# Patient Record
Sex: FEMALE | Race: WHITE | Employment: UNEMPLOYED | URBAN - METROPOLITAN AREA
[De-identification: names, ages, dates, MRNs, and addresses within clinical notes are randomized per-mention and may not be internally consistent; named-entity substitution may affect disease eponyms.]

---

## 2016-07-29 LAB
CHLAMYDIA, EXTERNAL: NEGATIVE
HBSAG, EXTERNAL: NORMAL
HIV, EXTERNAL: NEGATIVE
N. GONORRHEA, EXTERNAL: NEGATIVE
RUBELLA, EXTERNAL: NORMAL
TYPE, ABO & RH, EXTERNAL: NORMAL

## 2016-12-10 LAB
ANTIBODY SCREEN, EXTERNAL: NEGATIVE
T. PALLIDUM, EXTERNAL: NEGATIVE

## 2017-02-03 LAB — GRBS, EXTERNAL: POSITIVE

## 2017-03-01 ENCOUNTER — HOSPITAL ENCOUNTER (INPATIENT)
Age: 22
LOS: 3 days | Discharge: HOME | End: 2017-03-04
Attending: OBSTETRICS & GYNECOLOGY | Admitting: OBSTETRICS & GYNECOLOGY
Payer: COMMERCIAL

## 2017-03-01 PROBLEM — O42.90 PROM (PREMATURE RUPTURE OF MEMBRANES): Status: ACTIVE | Noted: 2017-03-01

## 2017-03-01 LAB
ALBUMIN SERPL BCP-MCNC: 2.9 G/DL (ref 3.5–5)
ALBUMIN/GLOB SERPL: 0.8 {RATIO} (ref 1.1–2.2)
ALP SERPL-CCNC: 152 U/L (ref 45–117)
ALT SERPL-CCNC: 23 U/L (ref 12–78)
ANION GAP BLD CALC-SCNC: 15 MMOL/L (ref 5–15)
AST SERPL W P-5'-P-CCNC: 23 U/L (ref 15–37)
BASOPHILS # BLD AUTO: 0 K/UL (ref 0–0.1)
BASOPHILS # BLD: 0 % (ref 0–1)
BILIRUB SERPL-MCNC: 0.3 MG/DL (ref 0.2–1)
BUN SERPL-MCNC: 10 MG/DL (ref 6–20)
BUN/CREAT SERPL: 16 (ref 12–20)
CALCIUM SERPL-MCNC: 8.8 MG/DL (ref 8.5–10.1)
CHLORIDE SERPL-SCNC: 108 MMOL/L (ref 97–108)
CO2 SERPL-SCNC: 19 MMOL/L (ref 21–32)
CREAT SERPL-MCNC: 0.62 MG/DL (ref 0.55–1.02)
EOSINOPHIL # BLD: 0.1 K/UL (ref 0–0.4)
EOSINOPHIL NFR BLD: 1 % (ref 0–7)
ERYTHROCYTE [DISTWIDTH] IN BLOOD BY AUTOMATED COUNT: 13.7 % (ref 11.5–14.5)
GLOBULIN SER CALC-MCNC: 3.7 G/DL (ref 2–4)
GLUCOSE SERPL-MCNC: 101 MG/DL (ref 65–100)
HCT VFR BLD AUTO: 39.4 % (ref 35–47)
HGB BLD-MCNC: 13.7 G/DL (ref 11.5–16)
LDH SERPL L TO P-CCNC: 234 U/L (ref 81–246)
LYMPHOCYTES # BLD AUTO: 14 % (ref 12–49)
LYMPHOCYTES # BLD: 1.7 K/UL (ref 0.8–3.5)
MCH RBC QN AUTO: 31.5 PG (ref 26–34)
MCHC RBC AUTO-ENTMCNC: 34.8 G/DL (ref 30–36.5)
MCV RBC AUTO: 90.6 FL (ref 80–99)
MONOCYTES # BLD: 0.6 K/UL (ref 0–1)
MONOCYTES NFR BLD AUTO: 5 % (ref 5–13)
NEUTS SEG # BLD: 9.5 K/UL (ref 1.8–8)
NEUTS SEG NFR BLD AUTO: 80 % (ref 32–75)
PLATELET # BLD AUTO: 171 K/UL (ref 150–400)
POTASSIUM SERPL-SCNC: 3.5 MMOL/L (ref 3.5–5.1)
PROT SERPL-MCNC: 6.6 G/DL (ref 6.4–8.2)
RBC # BLD AUTO: 4.35 M/UL (ref 3.8–5.2)
SODIUM SERPL-SCNC: 142 MMOL/L (ref 136–145)
URATE SERPL-MCNC: 4.1 MG/DL (ref 2.6–6)
WBC # BLD AUTO: 11.9 K/UL (ref 3.6–11)

## 2017-03-01 PROCEDURE — 85025 COMPLETE CBC W/AUTO DIFF WBC: CPT | Performed by: OBSTETRICS & GYNECOLOGY

## 2017-03-01 PROCEDURE — 65410000002 HC RM PRIVATE OB

## 2017-03-01 PROCEDURE — 84550 ASSAY OF BLOOD/URIC ACID: CPT | Performed by: OBSTETRICS & GYNECOLOGY

## 2017-03-01 PROCEDURE — 75410000002 HC LABOR FEE PER 1 HR

## 2017-03-01 PROCEDURE — 74011000258 HC RX REV CODE- 258: Performed by: OBSTETRICS & GYNECOLOGY

## 2017-03-01 PROCEDURE — 36415 COLL VENOUS BLD VENIPUNCTURE: CPT | Performed by: OBSTETRICS & GYNECOLOGY

## 2017-03-01 PROCEDURE — 74011250636 HC RX REV CODE- 250/636: Performed by: OBSTETRICS & GYNECOLOGY

## 2017-03-01 PROCEDURE — 80053 COMPREHEN METABOLIC PANEL: CPT | Performed by: OBSTETRICS & GYNECOLOGY

## 2017-03-01 PROCEDURE — 83615 LACTATE (LD) (LDH) ENZYME: CPT | Performed by: OBSTETRICS & GYNECOLOGY

## 2017-03-01 RX ORDER — SODIUM CHLORIDE, SODIUM LACTATE, POTASSIUM CHLORIDE, CALCIUM CHLORIDE 600; 310; 30; 20 MG/100ML; MG/100ML; MG/100ML; MG/100ML
125 INJECTION, SOLUTION INTRAVENOUS CONTINUOUS
Status: DISCONTINUED | OUTPATIENT
Start: 2017-03-01 | End: 2017-03-03 | Stop reason: ALTCHOICE

## 2017-03-01 RX ORDER — SODIUM CHLORIDE 0.9 % (FLUSH) 0.9 %
5-10 SYRINGE (ML) INJECTION EVERY 8 HOURS
Status: DISCONTINUED | OUTPATIENT
Start: 2017-03-01 | End: 2017-03-03 | Stop reason: ALTCHOICE

## 2017-03-01 RX ORDER — SODIUM CHLORIDE 0.9 % (FLUSH) 0.9 %
5-10 SYRINGE (ML) INJECTION AS NEEDED
Status: DISCONTINUED | OUTPATIENT
Start: 2017-03-01 | End: 2017-03-04 | Stop reason: HOSPADM

## 2017-03-01 RX ORDER — ONDANSETRON 2 MG/ML
4 INJECTION INTRAMUSCULAR; INTRAVENOUS
Status: DISCONTINUED | OUTPATIENT
Start: 2017-03-01 | End: 2017-03-02 | Stop reason: HOSPADM

## 2017-03-01 RX ORDER — SODIUM CHLORIDE 900 MG/100ML
INJECTION INTRAVENOUS
Status: DISPENSED
Start: 2017-03-01 | End: 2017-03-02

## 2017-03-01 RX ORDER — PENICILLIN G POTASSIUM 5000000 [IU]/1
INJECTION, POWDER, FOR SOLUTION INTRAMUSCULAR; INTRAVENOUS
Status: DISPENSED
Start: 2017-03-01 | End: 2017-03-02

## 2017-03-01 RX ORDER — ACETAMINOPHEN 325 MG/1
650 TABLET ORAL
Status: DISCONTINUED | OUTPATIENT
Start: 2017-03-01 | End: 2017-03-02 | Stop reason: HOSPADM

## 2017-03-01 RX ORDER — OXYTOCIN IN 5 % DEXTROSE 30/500 ML
2 PLASTIC BAG, INJECTION (ML) INTRAVENOUS
Status: DISCONTINUED | OUTPATIENT
Start: 2017-03-01 | End: 2017-03-03 | Stop reason: ALTCHOICE

## 2017-03-01 RX ORDER — NALBUPHINE HYDROCHLORIDE 10 MG/ML
10 INJECTION, SOLUTION INTRAMUSCULAR; INTRAVENOUS; SUBCUTANEOUS
Status: DISCONTINUED | OUTPATIENT
Start: 2017-03-01 | End: 2017-03-02 | Stop reason: HOSPADM

## 2017-03-01 RX ORDER — OXYTOCIN IN 5 % DEXTROSE 30/500 ML
PLASTIC BAG, INJECTION (ML) INTRAVENOUS
Status: DISPENSED
Start: 2017-03-01 | End: 2017-03-02

## 2017-03-01 RX ORDER — NALOXONE HYDROCHLORIDE 0.4 MG/ML
0.4 INJECTION, SOLUTION INTRAMUSCULAR; INTRAVENOUS; SUBCUTANEOUS AS NEEDED
Status: DISCONTINUED | OUTPATIENT
Start: 2017-03-01 | End: 2017-03-02 | Stop reason: HOSPADM

## 2017-03-01 RX ADMIN — PENICILLIN G POTASSIUM 2.5 MILLION UNITS: 20000000 POWDER, FOR SOLUTION INTRAVENOUS at 21:34

## 2017-03-01 RX ADMIN — Medication 10 ML: at 17:10

## 2017-03-01 RX ADMIN — Medication 8 MILLI-UNITS/MIN: at 21:34

## 2017-03-01 RX ADMIN — SODIUM CHLORIDE, SODIUM LACTATE, POTASSIUM CHLORIDE, AND CALCIUM CHLORIDE 125 ML/HR: 600; 310; 30; 20 INJECTION, SOLUTION INTRAVENOUS at 17:42

## 2017-03-01 RX ADMIN — Medication 2 MILLI-UNITS/MIN: at 20:00

## 2017-03-01 RX ADMIN — Medication 6 MILLI-UNITS/MIN: at 21:00

## 2017-03-01 RX ADMIN — SODIUM CHLORIDE 5 MILLION UNITS: 900 INJECTION, SOLUTION INTRAVENOUS at 17:42

## 2017-03-01 NOTE — PROGRESS NOTES
Problem: Vaginal Delivery: Day of Deliver-Laboring  Goal: Activity/Safety  Outcome: Progressing Towards Goal  Reviewed security interventions such as ID bands, staff badges

## 2017-03-01 NOTE — IP AVS SNAPSHOT
Current Discharge Medication List  
  
Take these medications as needed Dose & Instructions Dispensing Information Comments Morning Noon Evening Bedtime  
 ibuprofen 600 mg tablet Commonly known as:  MOTRIN Your next dose is: Today, Tomorrow Other:  ____________ Dose:  600 mg Take 1 Tab by mouth every eight (8) hours as needed for Pain. Quantity:  36 Tab Refills:  2 Take these medications as directed Dose & Instructions Dispensing Information Comments Morning Noon Evening Bedtime PRENATAL DHA+COMPLETE PRENATAL -300 mg-mcg-mg Cmpk Generic drug:  PNV no.24-iron-folic acid-dha Your next dose is: Today, Tomorrow Other:  ____________ Take  by mouth. Refills:  0 Where to Get Your Medications Information about where to get these medications is not yet available ! Ask your nurse or doctor about these medications  
  ibuprofen 600 mg tablet

## 2017-03-01 NOTE — IP AVS SNAPSHOT
[address] [facility] 
[phone] Patient: Rahat Agustin MRN: [id number] :1995 You are allergic to the following No active allergies Recent Documentation Height Weight Breastfeeding? BMI OB Status Smoking Status 1.549 m 86.2 kg Unknown 35.9 kg/m2 Recent pregnancy Never Smoker Unresulted Labs Order Current Status SAMPLE TO BLOOD BANK In process Emergency Contacts Name Discharge Info Relation Home Work Mobile Mady Mccann  Parent [1]   [phone] About your hospitalization You were admitted on:  2017 You last received care in the:  [facility] You were discharged on:  2017 Unit phone number:  [phone] Why you were hospitalized Your primary diagnosis was:  Not on File Your diagnoses also included:  Prom (Premature Rupture Of Membranes) Providers Seen During Your Hospitalizations Provider Role Specialty Primary office phone Yennifer Otero MD Attending Provider Obstetrics & Gynecology [phone] Your Primary Care Physician (PCP) Primary Care Physician Office Phone Office Fax Chugn Tyler [phone] Follow-up Information Follow up With Details Comments Contact Info Nata Pal MD   [address] [place] [address] [address] [phone] Current Discharge Medication List  
  
START taking these medications Dose & Instructions Dispensing Information Comments Morning Noon Evening Bedtime  
 ibuprofen 600 mg tablet Commonly known as:  MOTRIN Your next dose is: Today, Tomorrow Other:  _________ Dose:  600 mg Take 1 Tab by mouth every eight (8) hours as needed for Pain. Quantity:  36 Tab Refills:  2 CONTINUE these medications which have NOT CHANGED Dose & Instructions Dispensing Information Comments Morning Noon Evening Bedtime PRENATAL DHA+COMPLETE PRENATAL -300 mg-mcg-mg Cmpk Generic drug:  PNV no.24-iron-folic acid-dha Your next dose is: Today, Tomorrow Other:  _________ Take  by mouth. Refills:  0 Where to Get Your Medications Information on where to get these meds will be given to you by the nurse or doctor. ! Ask your nurse or doctor about these medications  
  ibuprofen 600 mg tablet Discharge Instructions POST DELIVERY DISCHARGE INSTRUCTIONS Name: Razia Junior YOB: 1995 Primary Diagnosis: Active Problems: PROM (premature rupture of membranes) (3/1/2017) General:  
 
Diet/Diet Restrictions: 
· Eight 8-ounce glasses of fluid daily (water, juices); avoid excessive caffeine intake. · Meals/snacks as desired which are high in fiber and carbohydrates and low in fat and cholesterol. Medications:  
{Medication reconciliation information is now added to the patient's AVS automatically when it is printed. There is no need to use this SmartLink in discharge instructions. Highlight this text and delete it to clear this message} Physical Activity / Restrictions / Safety: · Avoid heavy lifting, no more that 8 lbs. For 2-3 weeks;  
· Limit use of stairs to 2 times daily for the first week home. · No driving for one week. · Avoid intercourse 4-6 weeks, no douching or tampon use. · Check with obstetrician before starting or resuming an exercise program.   
 
Discharge Instructions/Special Treatment/Home Care Needs:  
 
· Continue prenatal vitamins. · Continue to use squirt bottle with warm water on your episiotomy after each bathroom use until bleeding stops. · If steri-strips applied to your incision, remove in 7-10 days. Call your doctor for the following: · Fever over 101 degrees by mouth. · Vaginal bleeding heavier than a normal menstrual period or clots larger than a golf ball. · Red streaks or increased swelling of legs, painful red streaks on your breast. 
· Painful urination, constipation and increased pain or swelling or discharge with your incision. · If you feel extremely anxious or overwhelmed. · If you have thoughts of harming yourself and/or your baby. Pain Management:  
 
· Take Acetaminophen (Tylenol) or Ibuprofen (Advil, Motrin), as directed for pain. · Use a warm Sitz bath 3 times daily to relieve episiotomy or hemorrhoidal discomfort. · For hemorrhoidal discomfort, use Tucks and Anusol cream as needed and directed. · Heating pad to  incision as needed. Follow-Up Care:  
 
Appointment with MD: No orders of the defined types were placed in this encounter. Telephone number: [phone] Signed By: Aurea Flores MD                                                                                                   Date: 3/4/2017 Time: 6:47 AM 
 
.We are excited to announce that we are making your provider's discharge notes available to you in [address] You will see these notes when they are completed and signed by the physician that discharged you from your recent hospital stay. If you have any questions or concerns about any information you see in [facility], please call the Health Information Department where you were seen or reach out to your Primary Care Provider for more information about your plan of care. Discharge Orders None [facility] Announcement We are excited to announce that we are making your provider's discharge notes available to you in [employer]hart. You will see these notes when they are completed and signed by the physician that discharged you from your recent hospital stay.   If you have any questions or concerns about any information you see in [facility], please call the [employer] Information Department where you were seen or reach out to your Primary Care Provider for more information about your plan of care. Introducing [facility]! [facility] introduces Tweek[employer]oo patient portal. Now you can access parts of your medical record, email your doctor's office, and request medication refills online. 1. In your internet browser, go to [email or web address] 2. Click on the First Time User? Click Here link in the Sign In box. You will see the New Member Sign Up page. 3. Enter your Tweekaboo Access Code exactly as it appears below. You will not need to use this code after youve completed the sign-up process. If you do not sign up before the expiration date, you must request a new code. · Tweekaboo Access Code: [email or web address] Expires: 5/29/2017  5:10 PM 
 
4. Enter the last four digits of your Social Security Number (xxxx) and Date of Birth (mm/dd/yyyy) as indicated and click Submit. You will be taken to the next sign-up page. 5. Create a Tweekaboo ID. This will be your Tweekaboo login ID and cannot be changed, so think of one that is secure and easy to remember. 6. Create a Tweekaboo password. You can change your password at any time. 7. Enter your Password Reset Question and Answer. This can be used at a later time if you forget your password. 8. Enter your e-mail address. You will receive e-mail notification when new information is available in [address] 9. Click Sign Up. You can now view and download portions of your medical record. 10. Click the Download Summary menu link to download a portable copy of your medical information. If you have questions, please visit the Frequently Asked Questions section of the Tweekaboo website. Remember, Tweekaboo is NOT to be used for urgent needs. For medical emergencies, dial 911. Now available from your iPhone and Android! General Information Please provide this summary of care documentation to your next provider. Patient Signature:  ____________________________________________________________ Date:  ____________________________________________________________  
  
Joelene Batman Provider Signature:  ____________________________________________________________ Date:  ____________________________________________________________

## 2017-03-01 NOTE — H&P
ADDENDUM:  Patient seen and examined  Continues to leak clear fluid  Feeling contractions but not overly painful  Visit Vitals    /85    Pulse 96    Temp 99.2 °F (37.3 °C)    Resp 18    Ht 5' 1\" (1.549 m)    Wt 86.2 kg (190 lb)    SpO2 99%    Breastfeeding No    BMI 35.9 kg/m2   , mod variability +accels  Cervical exam deferred (1/60%/-2 in office)  [place] q 3 min  vtx by ultrasound  A/P: 25 yo G1 @ 39w4d with SROM  - Given contraction pattern will observe for cervical change, if no change at next exam will start augmentation  - GBS pos: pcn ongoing  - ghtn bps normal on admit, nml cbc, cmp on admit  - fsr/gbs pos/efw 7.5#      EDC:2017  EGA: 39 weeks, 4 days      Vital Signs   24Years Old Female  Weight: 190.5 pounds  BP:       130/84    Pap/HPV/Gardasil History   History of abnormal pap: no  Gardasil Injection History: Complete    Patient's Prenatal Care with Doctor of Record Cecilia Parry MD Notable For -    Gestational hypertension  Headache pregnant  GBS positive RX in labor  UTI pregnant NING __neg 2016__   lab screening  Normal pregnancy primigravida  FIBROCYSTIC BREAST DISEASE              Past Pregnancy History      :  1     Term Births:  0     Premature Births: 0     Living Children: 0     Para:   0     Prev : 0     Aborta:  0     Elect. Ab:  0     Spont. Ab:  0     Ectopics:  0    Pregnancy # 1     Comments:  current        Allergies    This patient has no known allergies. Medications Removed from Medication List        [facility] [place] for Follow-up Visit     Estimated weeks of        gestation:  39 4/7     Weight:  190.5     Blood pressure: 130 / 84     Urine Protein:  N     Urine Glucose: N     Headache:  +     Nausea/vomiting: No     Edema:   TrLE     Vaginal bleeding: no     Vaginal discharge: d/c     Fetal activity:  yes     Fundal height:    38     FHR:   147     Labor symptoms: few ctx     Fetal position:  vertex     Cx Dilation:  1     Cx Effacement: 60%     Cx Station:  -2     Preceptor:  héctor     Comment:  Reports everytime gets up, feels gush of fluid   Started on the way here about 3p  Clear, some mucous and white 'flecks'   Was scheduled for IOL. BP good, Mild HA but intermittent   Active FM   No contractions          Impression & Recommendations:    Problem # 1:  ROM at term (XFV-624.57) (NVN36-W12.67)  presents to office with SROM at 3p today   clear fluid   Active fetal movement   reviewed consent for delivery   Will monitor contractions and assess for labor. If needed can augment   CBC, CMP, sample to blood bank         Problem # 2:  GBS positive RX in labor (WKP-763.39) (UJR54-Z82.82)  penicillin per protocol       Problem # 3:  Gestational hypertension (CYW-304.38) (DAO34-L83.3)  normotensive today   Monitor BP in labor   Prior labs negative for preE  No proteinuria         Medications (at conclusion of this visit)    07/29/2016 ONE-A-DAY WOMENS PRENATAL 1 28-0.8-235 MG ORAL CAPS (PRENAT-FE CARBONYL-FA-OMEGA 3)           Primary Provider:  Zonia Cruz MD    CC:  Leaking Fluid.     History of Present Illness:  seen in office iwth c/o SROM  on the way to appointment   Confirmed on exam   Grossly ruptured   active FM   GBS +         Past Medical History:     Reviewed history from 11/11/2011 and no changes required:        none    Past Surgical History:     Reviewed history from 11/11/2011 and no changes required:        Lumpectomy - Removal of Fibroadenoma right breast 06/22/11    Family History Summary:      Reviewed history Last on 09/29/2016 and no changes required:03/01/2017      General Comments - FH:  Family History Diabetes- MGM, m uncle and aunt  Family History Hypertension - MGM    No Family History Breast Cancer  No Family History Ovarian Cancer  No Family History Colon Cancer      Social History:     Reviewed history from 05/27/2015 and no changes required:         2014        Attends [employer] - Senior--graduated       Previous Tobacco Use: Signed On - 08/29/2016  Smoked Tobacco Use:  Never smoker  Passive smoke exposure:  no  Drug use:  no  HIV high-risk behavior:  no  Caffeine use:  0 drinks per day    Previous Alcohol Use: Signed On - 08/29/2016  Alcohol use:  no  Exercise:  no  Seatbelt use:  100 %  Sun Exposure:  occasionally    Family History Risk Factors:     Family History of MI in females < 72years old:  no     Family History of MI in males < 54years old:  no      Review of Systems        See HPI    Except as noted in the HPI, the review of systems is negative for General and .       Vital Signs    Blood Pressure: 130 / 84  Contractions:  no    Weight:  190.5 pounds      Physical Exam     General           General appearance:  no acute distress    Head           Inspection:   normal    Eyes           External:   EOM intact    ENT           Dental:   adequate dentition    Chest           Lungs:  clear to auscultation          Heart:  regular rate and rhythm    Extremeties           Extremeties:  0 edema    Neurological           Reflexes:  2+ and symmetric with no pathological reflexes    Psych           Orientation:  oriented to time, place, and person          Mood:  no appearance of anxiety, depression, or agitation    Lymph           Inguinal:  no inguinal adenopathy    Skin           Inspection:  no rashes, suspicious lesions, or ulcerations    Abdomen           Abdomen:  gravid          Fundal Height:  38    Pelvic Exam           EGBUS:  no lesions          Vagina:  Positive  pool,fern and  nitrizine          Uterus:  gravid          Cervix:  no lesions or discharge                  Dilation: : 1                  Effacement:  60%                  Station:  -2                  Presentation:  vertex                  Membranes:  ruptured, clear fluid                   Pelvis:  adequate                  Consistency:  soft                  Position: anterior    Allergies    This patient has no known allergies. Medications Removed from Medication List        Impression & Recommendations:    Problem # 1:  ROM at term (DMQ-708.22) (OIQ87-F02.02)  presents to office with SROM at 3p today   clear fluid   Active fetal movement   reviewed consent for delivery   Will monitor contractions and assess for labor. If needed can augment   CBC, CMP, sample to blood bank         Problem # 2:  GBS positive RX in labor (BZQ-394.96) (DOH24-S22.82)  penicillin per protocol       Problem # 3:  Gestational hypertension (SXI-053.99) (DPK36-T11.3)  normotensive today   Monitor BP in labor   Prior labs negative for preE  No proteinuria         Medications (at conclusion of this visit)    07/29/2016 ONE-A-DAY WOMENS PRENATAL 1 28-0.8-235 MG ORAL CAPS (PRENAT-FE CARBONYL-FA-OMEGA 3)           LABORATORY DATA   TEST DATE RESULT   Group B Strep culture 02/03/2017 Positive                                   (Group B Strep Culture Result Field)   Blood Type 07/29/2016 O                                             (Blood Type Result Field)   Rh 07/29/2016 Positive                                   (Rh Result Field)   Rhogam Inj Given     Tdap Vaccine Given 12/09/2016 Vacc.  [address]   Antibody Screen 12/09/2016 Negative   Rubella  [facility] Reference Ranges On or After 3/10/14                  <0.90              Non-immune      0.90 - 0.99     Equivocal      >0.99              Immune    [facility] Reference Ranges  Before 3/10/14           <5                 Non-immune             5 - 9               Equivocal            >9                 Immune  [facility] Reference Ranges       < Or = 0.90       Negative             0.91-1.09          Equivocal            > Or = 1.10       Positive   07/29/2016     1.48     TPA (T Pallidum Antibodies) 12/09/2016 Negative   Serology (RPR) 11/11/2011 Non Reactive   HBsAg 07/29/2016 Negative   HIV 07/29/2016 Non Reactive   Hemoglobin 02/22/2017 13.6   Hematocrit 02/22/2017 39.7   Platelets 63/14/6362 175 X10E3/UL TSH     Urine Culture 09/29/2016 Negative   GC DNA Probe 07/29/2016 Negative   Chlamydia DNA 07/29/2016 Negative   PAP     Flu Vaccine Given 09/23/2016 Vacc.  VWC   HGBA1C     HGB Electro     T4, Free     BG Fasting     GTT 1H 50G 12/09/2016 131   GTT 1H 100G     GTT 2H 100G     GTT 3H 100G     Glucose Plasma     CF Accept or Decline 07/29/2016 declined   CF Screen Result     Nuchal Trans 07/29/2016 declined   AFP Only     Tetra 09/23/2016 Declined   AFP Serum     CVS 07/29/2016 declined   AFP Amniotic     Amnio Karyo 07/29/2016 declined   FISH     GC Culture     Chlamydia Cult     Ureaplasma     Mycoplasma     WBC 02/22/2017 10.1 X10E3/UL   RBC 02/22/2017 4.44 X10E6/UL   MCV 02/22/2017 89   MCH 02/22/2017 30.6   MCHC RBC 02/22/2017 34.3     ULTRASOUND DATA   TEST DATE RESULT   Estimated Fetal Weight 10/21/2016 [phone]                                     Weight % 10/21/2016 62nd%%                                                DEMARCO                      BPP     Cervical Length (mm)       ]

## 2017-03-02 PROCEDURE — 75410000000 HC DELIVERY VAGINAL/SINGLE

## 2017-03-02 PROCEDURE — 74011250636 HC RX REV CODE- 250/636: Performed by: OBSTETRICS & GYNECOLOGY

## 2017-03-02 PROCEDURE — 74011250637 HC RX REV CODE- 250/637: Performed by: OBSTETRICS & GYNECOLOGY

## 2017-03-02 PROCEDURE — 75410000002 HC LABOR FEE PER 1 HR

## 2017-03-02 PROCEDURE — 77030021125

## 2017-03-02 PROCEDURE — 75410000003 HC RECOV DEL/VAG/CSECN EA 0.5 HR

## 2017-03-02 PROCEDURE — 65410000002 HC RM PRIVATE OB

## 2017-03-02 PROCEDURE — 0HQ9XZZ REPAIR PERINEUM SKIN, EXTERNAL APPROACH: ICD-10-PCS | Performed by: OBSTETRICS & GYNECOLOGY

## 2017-03-02 PROCEDURE — 77030002888 HC SUT CHRMC J&J -A

## 2017-03-02 PROCEDURE — 77030011943

## 2017-03-02 RX ORDER — OXYTOCIN/RINGER'S LACTATE 20/1000 ML
125-500 PLASTIC BAG, INJECTION (ML) INTRAVENOUS ONCE
Status: ACTIVE | OUTPATIENT
Start: 2017-03-02 | End: 2017-03-02

## 2017-03-02 RX ORDER — BUPIVACAINE HYDROCHLORIDE AND EPINEPHRINE 2.5; 5 MG/ML; UG/ML
INJECTION, SOLUTION EPIDURAL; INFILTRATION; INTRACAUDAL; PERINEURAL
Status: DISPENSED
Start: 2017-03-02 | End: 2017-03-02

## 2017-03-02 RX ORDER — FENTANYL/BUPIVACAINE/NS/PF 2-1250MCG
PREFILLED PUMP RESERVOIR EPIDURAL
Status: DISPENSED
Start: 2017-03-02 | End: 2017-03-02

## 2017-03-02 RX ORDER — OXYCODONE AND ACETAMINOPHEN 5; 325 MG/1; MG/1
1 TABLET ORAL
Status: DISCONTINUED | OUTPATIENT
Start: 2017-03-02 | End: 2017-03-04 | Stop reason: HOSPADM

## 2017-03-02 RX ORDER — HYDROCORTISONE ACETATE PRAMOXINE HCL 2.5; 1 G/100G; G/100G
CREAM TOPICAL AS NEEDED
Status: DISCONTINUED | OUTPATIENT
Start: 2017-03-02 | End: 2017-03-04 | Stop reason: HOSPADM

## 2017-03-02 RX ORDER — IBUPROFEN 400 MG/1
800 TABLET ORAL EVERY 8 HOURS
Status: DISCONTINUED | OUTPATIENT
Start: 2017-03-02 | End: 2017-03-04 | Stop reason: HOSPADM

## 2017-03-02 RX ORDER — ADHESIVE BANDAGE
30 BANDAGE TOPICAL DAILY PRN
Status: DISCONTINUED | OUTPATIENT
Start: 2017-03-02 | End: 2017-03-04 | Stop reason: HOSPADM

## 2017-03-02 RX ORDER — NALOXONE HYDROCHLORIDE 0.4 MG/ML
0.4 INJECTION, SOLUTION INTRAMUSCULAR; INTRAVENOUS; SUBCUTANEOUS AS NEEDED
Status: DISCONTINUED | OUTPATIENT
Start: 2017-03-02 | End: 2017-03-04 | Stop reason: HOSPADM

## 2017-03-02 RX ADMIN — PENICILLIN G POTASSIUM 2.5 MILLION UNITS: 20000000 POWDER, FOR SOLUTION INTRAVENOUS at 01:30

## 2017-03-02 RX ADMIN — PENICILLIN G POTASSIUM 2.5 MILLION UNITS: 20000000 POWDER, FOR SOLUTION INTRAVENOUS at 09:30

## 2017-03-02 RX ADMIN — IBUPROFEN 800 MG: 400 TABLET, FILM COATED ORAL at 21:00

## 2017-03-02 RX ADMIN — NALBUPHINE HYDROCHLORIDE 10 MG: 10 INJECTION, SOLUTION INTRAMUSCULAR; INTRAVENOUS; SUBCUTANEOUS at 08:35

## 2017-03-02 RX ADMIN — NALBUPHINE HYDROCHLORIDE 10 MG: 10 INJECTION, SOLUTION INTRAMUSCULAR; INTRAVENOUS; SUBCUTANEOUS at 05:08

## 2017-03-02 RX ADMIN — SODIUM CHLORIDE, SODIUM LACTATE, POTASSIUM CHLORIDE, AND CALCIUM CHLORIDE 125 ML/HR: 600; 310; 30; 20 INJECTION, SOLUTION INTRAVENOUS at 09:30

## 2017-03-02 RX ADMIN — SODIUM CHLORIDE, SODIUM LACTATE, POTASSIUM CHLORIDE, AND CALCIUM CHLORIDE 125 ML/HR: 600; 310; 30; 20 INJECTION, SOLUTION INTRAVENOUS at 04:12

## 2017-03-02 RX ADMIN — IBUPROFEN 800 MG: 400 TABLET, FILM COATED ORAL at 13:28

## 2017-03-02 NOTE — PROGRESS NOTES
Dr. Vonda Yung at bedside to assess patient and SVE done. Pt 1/60/-2. Order to start Pitocin. Pt is in agreement.

## 2017-03-02 NOTE — PROGRESS NOTES
Mild discomfort with contractions  , mod variability + accels  [place]oco q 5 min  SVE 1/60%/-2  - No change since office exam 4 hours ago, will start pitocin for augmentation  - gbs pos on pcn

## 2017-03-02 NOTE — PROGRESS NOTES
TRANSFER - IN REPORT:    Verbal report received from MONET Cole RN(name) on Jamey Light  being received from L&D(unit) for routine progression of care      Report consisted of patients Situation, Background, Assessment and   Recommendations(SBAR). Information from the following report(s) SBAR, Procedure Summary, Intake/Output, MAR and Recent Results was reviewed with the receiving nurse. Opportunity for questions and clarification was provided. Assessment completed upon patients arrival to unit and care assumed.

## 2017-03-02 NOTE — PROGRESS NOTES
Brett known to me from office and prenatal course. Presented to office yesterday with SROM approx 3p   No cervical change on exam 4 hours post rupture   Category 1 fetal tracign   Started on pitocin for augmentation  Now at 22 with ctx every 2-3 minutes   Feeling uncomfortable, considering epidural  Reviewed pain control options includign nubain, nitrous and epidural  Some relief with prior nubain. Cervical Exam  Dilation (cm): 5  Eff: 100 %  Membrane Status: SROM   Station: 0   Vertex well applied.      Clear fluid noted     Proceed with epidural per patient request   Nitrous or nubain in interval if desired   Anticipate vaginal delivery   GBS+ on penicillin, no evidence of infection

## 2017-03-02 NOTE — L&D DELIVERY NOTE
Delivery Summary  Patient: Jeff Garber             Circumcision:   desires  Additional Delivery Comments - SROM with augmentation secondary to ineffective contractions. Proceeded with uncomplicated labor   , lamaze delivery   Vigorous infant at birth   1st degree perineal laceration repaired. 10cc 1%lidocaine local anesthetic for repair. Hemostatic post repair         Information for the patient's :  Duc More [[id number]]       Labor Events:    Labor: No   Rupture Date:     Rupture Time:     Rupture Type     Amniotic Fluid Volume: Moderate    Amniotic Fluid Description:       Induction: None       Augmentation: Oxytocin   Labor Events:       Cervical Ripening:           Delivery Events:  Episiotomy: None   Laceration(s): First degree perineal     Repaired: Yes    Number of Repair Packets: 1   Suture Type and Size: Chromic 2-0     Estimated Blood Loss (ml): 150ml       Delivery Date: 3/2/2017    Delivery Time: 10:23 AM  Delivery Type: Vaginal, Spontaneous Delivery  Sex:  Male     Gestational Age: 38w11d   Delivery Clinician:  Nerissa Bernstein  Living Status: Yes   Delivery Location: L&D            APGARS  One minute Five minutes Ten minutes   Skin color: 0   1        Heart rate: 2   2        Grimace: 2   2        Muscle tone: 1   2        Breathin   2        Totals: 7   9            Presentation: Vertex    Position:   Occiput Anterior  Resuscitation Method:  Suctioning-bulb; Tactile Stimulation     Meconium Stained: None      Cord Vessels: 3 Vessels      Cord Events:    Cord Blood Sent?:  No    Blood Gases Sent?:  No    Placenta:  Date/Time:    Removal: Spontaneous      Appearance: Normal     [place] Measurements:  Birth Weight: 3.425 kg      Birth Length: 53.3 cm      Head Circumference: 30.5 cm      Chest Circumference: 12.5 cm     Abdominal Girth: 29.2 cm    Other Providers:   STEPHAN BERNSTEIN;LETI HASKINS;LACHELLE ZHANG;;;;;;FILIBERTO BOONE, Obstetrician;Primary Nurse;Primary  Nurse;Nicu Nurse;Neonatologist;Anesthesiologist;Crna;Nursery Nurse;Tech           Cord pH:  none    Episiotomy: None   Laceration(s): First degree perineal      Estimated Blood Loss (ml): 150    Labor Events  Method: None       Augmentation: Oxytocin   Cervical Ripening:                Operative Vaginal Delivery - none    Group B Strep:   Lab Results   Component Value Date/Time    GrBStrep, External Positive 2017     Information for the patient's :  Roxann Gold [[id number]]   No results found for: ABORH, PCTABR, PCTDIG, BILI, ABORHEXT, ABORH    No results found for: APH, APCO2, APO2, AHCO3, ABEC, ABDC, O2ST, EPHV, PCO2V, PO2V, HCO3V, EBEV, EBDV, SITE, RSCOM

## 2017-03-02 NOTE — PROGRESS NOTES
0825: Dr. Carolina Dey in room, SVE done.       03/02/17 0825   Cervical Exam   Dilation (cm) 5   Eff 100 %

## 2017-03-02 NOTE — LACTATION NOTE
This note was copied from a baby's chart. 2 hours of age  1923 [facility] services introduced and well received. Reviewed basics of breastfeeding during the hospital stay, provided handouts and instruction in self breast massage/expression. Mother has obtained her insurance provided breast pump for prn uses. May bring to room if needed for review/instruction. Infant is rooting and fussy/cradle hold ineffective to latch. Reviewed un tuck chin and nipple to palate guidance for deep and comfortable latch. Cross cradle effective. Latched and nursing with repeated suckle / pause cycles. Reviewed bedside I/0 to track, how to know baby is getting enough. Mother receptive to nursing through her being straight cathed/full bladder. Repositioned afterwards to football hold, easy re latch. Finger suckle calming/soothing instructed if fussy prior to latching. On going 30 minute feeding, baby led feedings encouraged from start to finish. 1923 [facility] # provided. Expect success.

## 2017-03-03 PROCEDURE — 65410000002 HC RM PRIVATE OB

## 2017-03-03 PROCEDURE — 74011250637 HC RX REV CODE- 250/637: Performed by: OBSTETRICS & GYNECOLOGY

## 2017-03-03 RX ORDER — LIDOCAINE HYDROCHLORIDE 10 MG/ML
INJECTION, SOLUTION EPIDURAL; INFILTRATION; INTRACAUDAL; PERINEURAL
Status: DISPENSED
Start: 2017-03-03 | End: 2017-03-03

## 2017-03-03 RX ADMIN — IBUPROFEN 800 MG: 400 TABLET, FILM COATED ORAL at 20:36

## 2017-03-03 RX ADMIN — IBUPROFEN 800 MG: 400 TABLET, FILM COATED ORAL at 12:57

## 2017-03-03 RX ADMIN — IBUPROFEN 800 MG: 400 TABLET, FILM COATED ORAL at 05:06

## 2017-03-03 NOTE — PROGRESS NOTES
Post-Partum Day Number 1 Progress Note    Shane Harris     Assessment: Doing well, post partum day 1    Plan:  1. Continue routine postpartum and perineal care as well as maternal education. 2. Will plan circ for tomorrow given poor feeding. Information for the patient's :  [facility] Sun [[id number]]   Vaginal, Spontaneous Delivery   Patient doing well without significant complaint. Voiding without difficulty, normal lochia. Vitals:  Visit Vitals    /82 (BP 1 Location: Right arm, BP Patient Position: At rest)    Pulse 84    Temp 97.8 °F (36.6 °C)    Resp 17    Ht 5' 1\" (1.549 m)    Wt 86.2 kg (190 lb)    SpO2 97%    Breastfeeding Unknown    BMI 35.9 kg/m2     Temp (24hrs), Av.3 °F (36.8 °C), Min:97.7 °F (36.5 °C), Max:99.5 °F (37.5 °C)        Exam:   Patient without distress. Abdomen soft, fundus firm, nontender                Perineum with normal lochia noted. Lower extremities are negative for swelling, cords or tenderness. Labs:     Lab Results   Component Value Date/Time    WBC 11.9 2017 05:13 PM    WBC 5.5 2014 09:52 AM    HGB 13.7 2017 05:13 PM    HGB 13.4 2014 09:52 AM    HCT 39.4 2017 05:13 PM    HCT 41.3 2014 09:52 AM    PLATELET 070  05:13 PM    PLATELET 210  09:52 AM       No results found for this or any previous visit (from the past 24 hour(s)).

## 2017-03-03 NOTE — PROGRESS NOTES
Bedside shift change report given to GOMEZ Ford (oncoming nurse) by Lauren Rodriguez (offgoing nurse). Report included the following information SBAR.

## 2017-03-03 NOTE — LACTATION NOTE
This note was copied from a baby's chart. Day 1 progress note  19 hours of life, 7 baby led breast feeeing sessions. Mother independent and well read. Bedside I/0 on tract with 4 wet and 4 stools. Parents enjoying baby. Feedings of short duration 5-10 minute sessions, baby is content. Am wt assessed at -2.4 %  Encouraged to continue with hand expression/massage during feeding, will continue to follow and support. Expect feedings to last longer as infant wakes more today. [address] [facility] # provided. Call prn.   1100 Assisted to wake infant, finger suckle assessment and stimulation of suckle reflex instructed. Support breast to guide nipple to palate contact. Infant is not eager at this time. Will benefit by using symphony breast pump today to ebm and stimulate supply. 1115 Pump set up and sized to 27 mm flanges. Infant skin to skin and stimulating to suckle. Recommend double pumping 10-15 minutes pc to begin adequate breast emptying to maximize milk supply. Mother agrees. 1400 5 minute feeding at 1345. Mother eating lunch now. Instructed to start double pumping every 2-3 hours x 20 minutes until baby waking and nursing vigorously. Staff nurse notified/aware of poor feeds. 1500 Mother now double pumping. Gtts colostrum noted in flange. Gtts to finger feed or nuk nipple instructed.

## 2017-03-03 NOTE — ROUTINE PROCESS
Bedside shift change report given to ROWENA Rodriguez RN (oncoming nurse) by ROSAMARIA Muhammad RN (offgoing nurse). Report included the following information SBAR, Procedure Summary, Intake/Output, MAR and Recent Results.

## 2017-03-04 VITALS
RESPIRATION RATE: 17 BRPM | DIASTOLIC BLOOD PRESSURE: 87 MMHG | OXYGEN SATURATION: 97 % | HEIGHT: 61 IN | WEIGHT: 190 LBS | SYSTOLIC BLOOD PRESSURE: 147 MMHG | BODY MASS INDEX: 35.87 KG/M2 | HEART RATE: 107 BPM | TEMPERATURE: 98.2 F

## 2017-03-04 PROCEDURE — 74011250637 HC RX REV CODE- 250/637: Performed by: OBSTETRICS & GYNECOLOGY

## 2017-03-04 RX ORDER — IBUPROFEN 600 MG/1
600 TABLET ORAL
Qty: 36 TAB | Refills: 2 | Status: ON HOLD | OUTPATIENT
Start: 2017-03-04 | End: 2019-10-31 | Stop reason: CLARIF

## 2017-03-04 RX ADMIN — IBUPROFEN 800 MG: 400 TABLET, FILM COATED ORAL at 04:28

## 2017-03-04 RX ADMIN — IBUPROFEN 800 MG: 400 TABLET, FILM COATED ORAL at 12:24

## 2017-03-04 NOTE — ROUTINE PROCESS
Bedside shift change report given to GOMEZ Ashley RN (oncoming nurse) by LAYA Frances RN (offgoing nurse). Report included the following information SBAR.

## 2017-03-04 NOTE — PROGRESS NOTES
Post-Partum Day Number 2 Progress Note    Elías Márquez     Assessment: Doing well, post partum day 2    Plan:   1. Discharge home today  2. Follow up in office in 6 weeks with Nishant Acuña MD  3. Post partum activity advised, diet as tolerated  4. Discharge Medications: ibuprofen, percocet and medications prior to admission    Information for the patient's :  Swathi Call [[id number]]   Vaginal, Spontaneous Delivery   Patient doing well without significant complaint. Voiding without difficulty, normal lochia. Vitals:  Visit Vitals    /87 (BP 1 Location: Left arm, BP Patient Position: At rest;Head of bed elevated (Comment degrees))    Pulse (!) 107    Temp 98 °F (36.7 °C)    Resp 16    Ht 5' 1\" (1.549 m)    Wt 86.2 kg (190 lb)    SpO2 97%    Breastfeeding Unknown    BMI 35.9 kg/m2     Temp (24hrs), Av.2 °F (36.8 °C), Min:97.8 °F (36.6 °C), Max:98.5 °F (36.9 °C)      Exam:         Patient without distress. Abdomen soft, fundus firm, nontender                 Lower extremities are negative for swelling, cords or tenderness. Labs:     Lab Results   Component Value Date/Time    WBC 11.9 2017 05:13 PM    WBC 5.5 2014 09:52 AM    HGB 13.7 2017 05:13 PM    HGB 13.4 2014 09:52 AM    HCT 39.4 2017 05:13 PM    HCT 41.3 2014 09:52 AM    PLATELET 169  05:13 PM    PLATELET 075  09:52 AM       No results found for this or any previous visit (from the past 24 hour(s)).

## 2017-03-04 NOTE — PROGRESS NOTES
Patient discharged out to car in wheelchair and SO by side. All belongings intact. Infant in carseat.

## 2017-03-04 NOTE — PROGRESS NOTES
Discharge instructions went over with patient. Patient verbalizes understanding and all questions answered. Prescriptions given to patient. No further questions.

## 2017-03-04 NOTE — LACTATION NOTE
This note was copied from a baby's chart. Breastfeeding after 4 ml's of EBM given at 1045. Weight loss is 7.8% with 2 voids and 3 stools in 24 hours. Mom has pumped 3 ml's, 3 ml's and 4 ml's. Mom pumped after circumcision to help wake up. Possible discharge today pending culture.

## 2017-03-04 NOTE — DISCHARGE INSTRUCTIONS
POST DELIVERY DISCHARGE INSTRUCTIONS    Name: Leonie Leblanc  YOB: 1995  Primary Diagnosis: Active Problems:    PROM (premature rupture of membranes) (3/1/2017)        General:     Diet/Diet Restrictions:  · Eight 8-ounce glasses of fluid daily (water, juices); avoid excessive caffeine intake. · Meals/snacks as desired which are high in fiber and carbohydrates and low in fat and cholesterol. Medications:   {Medication reconciliation information is now added to the patient's AVS automatically when it is printed. There is no need to use this SmartLink in discharge instructions. Highlight this text and delete it to clear this message}      Physical Activity / Restrictions / Safety:     · Avoid heavy lifting, no more that 8 lbs. For 2-3 weeks;   · Limit use of stairs to 2 times daily for the first week home. · No driving for one week. · Avoid intercourse 4-6 weeks, no douching or tampon use. · Check with obstetrician before starting or resuming an exercise program.      Discharge Instructions/Special Treatment/Home Care Needs:     · Continue prenatal vitamins. · Continue to use squirt bottle with warm water on your episiotomy after each bathroom use until bleeding stops. · If steri-strips applied to your incision, remove in 7-10 days. Call your doctor for the following:     · Fever over 101 degrees by mouth. · Vaginal bleeding heavier than a normal menstrual period or clots larger than a golf ball. · Red streaks or increased swelling of legs, painful red streaks on your breast.  · Painful urination, constipation and increased pain or swelling or discharge with your incision. · If you feel extremely anxious or overwhelmed. · If you have thoughts of harming yourself and/or your baby. Pain Management:     · Take Acetaminophen (Tylenol) or Ibuprofen (Advil, Motrin), as directed for pain. · Use a warm Sitz bath 3 times daily to relieve episiotomy or hemorrhoidal discomfort.    · For hemorrhoidal discomfort, use Tucks and Anusol cream as needed and directed. · Heating pad to  incision as needed. Follow-Up Care:     Appointment with MD: No orders of the defined types were placed in this encounter. Telephone number: [phone]    Signed By: Poornima Wing MD                                                                                                   Date: 3/4/2017 Time: 6:47 AM    .We are excited to announce that we are making your provider's discharge notes available to you in [address] You will see these notes when they are completed and signed by the physician that discharged you from your recent hospital stay. If you have any questions or concerns about any information you see in MyChart, please call the Health Information Department where you were seen or reach out to your Primary Care Provider for more information about your plan of care.

## 2017-03-04 NOTE — DISCHARGE SUMMARY
Obstetrical Discharge Summary     Name: Nena Kaur MRN: [id number]  SSN: [id number]    YOB: 1995  Age: 24 y.o. Sex: female      Admit Date: 3/1/2017    Discharge Date: 3/4/2017     Admitting Physician: Steve George MD     Attending Physician:  Annamarie Schwartz MD     Admission Diagnoses: indInduction  PROM (premature rupture of membranes)    Discharge Diagnoses:   Information for the patient's :  Roxann Gold [[id number]]   Delivery of a 3.425 kg male infant via Vaginal, Spontaneous Delivery on 3/2/2017 at 10:23 AM  by . Apgars were 7 and 9. Additional Diagnoses:   Hospital Problems  Never Reviewed          Codes Class Noted POA    PROM (premature rupture of membranes) ICD-10-CM: O42.90  ICD-9-CM: 658.10  3/1/2017 Unknown             Lab Results   Component Value Date/Time    Rubella, External Immune 2016    GrBStrep, External Positive 2017       Hospital Course: Normal hospital course following the delivery. Disposition at Discharge: Home or self care    Discharged Condition: Stable    Patient Instructions:   Current Discharge Medication List      CONTINUE these medications which have NOT CHANGED    Details   PNV no.24-iron-folic acid-dha (PRENATAL DHA+COMPLETE PRENATAL) -300 mg-mcg-mg cmpk Take  by mouth. Reference my discharge instructions. No orders of the defined types were placed in this encounter.        Signed By:  Fay Osborne MD     2017

## 2017-03-04 NOTE — ROUTINE PROCESS
Bedside and Verbal shift change report given to LAYA Loco (oncoming nurse) by Vicente Black. Jeanne Farr RN (offgoing nurse). Report included the following information SBAR.

## 2018-07-09 VITALS
HEIGHT: 62 IN | TEMPERATURE: 98 F | DIASTOLIC BLOOD PRESSURE: 81 MMHG | HEART RATE: 86 BPM | OXYGEN SATURATION: 100 % | BODY MASS INDEX: 24.18 KG/M2 | SYSTOLIC BLOOD PRESSURE: 139 MMHG | WEIGHT: 131.39 LBS | RESPIRATION RATE: 16 BRPM

## 2018-07-09 LAB
ALBUMIN SERPL-MCNC: 4.4 G/DL (ref 3.5–5)
ALBUMIN/GLOB SERPL: 1.4 {RATIO} (ref 1.1–2.2)
ALP SERPL-CCNC: 84 U/L (ref 45–117)
ALT SERPL-CCNC: 20 U/L (ref 12–78)
ANION GAP SERPL CALC-SCNC: 8 MMOL/L (ref 5–15)
AST SERPL-CCNC: 14 U/L (ref 15–37)
BASOPHILS # BLD: 0 K/UL (ref 0–0.1)
BASOPHILS NFR BLD: 1 % (ref 0–1)
BILIRUB SERPL-MCNC: 0.2 MG/DL (ref 0.2–1)
BUN SERPL-MCNC: 14 MG/DL (ref 6–20)
BUN/CREAT SERPL: 19 (ref 12–20)
CALCIUM SERPL-MCNC: 9.5 MG/DL (ref 8.5–10.1)
CHLORIDE SERPL-SCNC: 108 MMOL/L (ref 97–108)
CO2 SERPL-SCNC: 27 MMOL/L (ref 21–32)
CREAT SERPL-MCNC: 0.74 MG/DL (ref 0.55–1.02)
DIFFERENTIAL METHOD BLD: NORMAL
EOSINOPHIL # BLD: 0.2 K/UL (ref 0–0.4)
EOSINOPHIL NFR BLD: 2 % (ref 0–7)
ERYTHROCYTE [DISTWIDTH] IN BLOOD BY AUTOMATED COUNT: 12.3 % (ref 11.5–14.5)
GLOBULIN SER CALC-MCNC: 3.1 G/DL (ref 2–4)
GLUCOSE SERPL-MCNC: 109 MG/DL (ref 65–100)
HCT VFR BLD AUTO: 41.4 % (ref 35–47)
HGB BLD-MCNC: 13.8 G/DL (ref 11.5–16)
IMM GRANULOCYTES # BLD: 0 K/UL (ref 0–0.04)
IMM GRANULOCYTES NFR BLD AUTO: 0 % (ref 0–0.5)
LYMPHOCYTES # BLD: 2.3 K/UL (ref 0.8–3.5)
LYMPHOCYTES NFR BLD: 33 % (ref 12–49)
MCH RBC QN AUTO: 29.7 PG (ref 26–34)
MCHC RBC AUTO-ENTMCNC: 33.3 G/DL (ref 30–36.5)
MCV RBC AUTO: 89.2 FL (ref 80–99)
MONOCYTES # BLD: 0.4 K/UL (ref 0–1)
MONOCYTES NFR BLD: 5 % (ref 5–13)
NEUTS SEG # BLD: 4.2 K/UL (ref 1.8–8)
NEUTS SEG NFR BLD: 59 % (ref 32–75)
NRBC # BLD: 0 K/UL (ref 0–0.01)
NRBC BLD-RTO: 0 PER 100 WBC
PLATELET # BLD AUTO: 201 K/UL (ref 150–400)
PMV BLD AUTO: 11 FL (ref 8.9–12.9)
POTASSIUM SERPL-SCNC: 3.9 MMOL/L (ref 3.5–5.1)
PROT SERPL-MCNC: 7.5 G/DL (ref 6.4–8.2)
RBC # BLD AUTO: 4.64 M/UL (ref 3.8–5.2)
SODIUM SERPL-SCNC: 143 MMOL/L (ref 136–145)
WBC # BLD AUTO: 7 K/UL (ref 3.6–11)

## 2018-07-09 PROCEDURE — 85025 COMPLETE CBC W/AUTO DIFF WBC: CPT | Performed by: EMERGENCY MEDICINE

## 2018-07-09 PROCEDURE — 36415 COLL VENOUS BLD VENIPUNCTURE: CPT | Performed by: EMERGENCY MEDICINE

## 2018-07-09 PROCEDURE — 80053 COMPREHEN METABOLIC PANEL: CPT | Performed by: EMERGENCY MEDICINE

## 2018-07-09 PROCEDURE — 83690 ASSAY OF LIPASE: CPT | Performed by: EMERGENCY MEDICINE

## 2018-07-09 PROCEDURE — 99283 EMERGENCY DEPT VISIT LOW MDM: CPT

## 2018-07-10 ENCOUNTER — HOSPITAL ENCOUNTER (EMERGENCY)
Age: 23
Discharge: HOME | End: 2018-07-10
Attending: EMERGENCY MEDICINE
Payer: COMMERCIAL

## 2018-07-10 DIAGNOSIS — R11.0 NAUSEA WITHOUT VOMITING: ICD-10-CM

## 2018-07-10 DIAGNOSIS — R10.84 ABDOMINAL PAIN, GENERALIZED: Primary | ICD-10-CM

## 2018-07-10 DIAGNOSIS — K29.00 ACUTE GASTRITIS WITHOUT HEMORRHAGE, UNSPECIFIED GASTRITIS TYPE: ICD-10-CM

## 2018-07-10 LAB
APPEARANCE UR: CLEAR
BACTERIA URNS QL MICRO: ABNORMAL /HPF
BILIRUB UR QL: NEGATIVE
COLOR UR: ABNORMAL
EPITH CASTS URNS QL MICRO: ABNORMAL /LPF
GLUCOSE UR STRIP.AUTO-MCNC: NEGATIVE MG/DL
HCG UR QL: NEGATIVE
HGB UR QL STRIP: NEGATIVE
HYALINE CASTS URNS QL MICRO: ABNORMAL /LPF (ref 0–5)
KETONES UR QL STRIP.AUTO: NEGATIVE MG/DL
LEUKOCYTE ESTERASE UR QL STRIP.AUTO: NEGATIVE
LIPASE SERPL-CCNC: 157 U/L (ref 73–393)
NITRITE UR QL STRIP.AUTO: NEGATIVE
PH UR STRIP: 7 [PH] (ref 5–8)
PROT UR STRIP-MCNC: NEGATIVE MG/DL
RBC #/AREA URNS HPF: ABNORMAL /HPF (ref 0–5)
SP GR UR REFRACTOMETRY: 1.02 (ref 1–1.03)
UA: UC IF INDICATED,UAUC: ABNORMAL
UROBILINOGEN UR QL STRIP.AUTO: 0.2 EU/DL (ref 0.2–1)
WBC URNS QL MICRO: ABNORMAL /HPF (ref 0–4)

## 2018-07-10 PROCEDURE — 87086 URINE CULTURE/COLONY COUNT: CPT | Performed by: EMERGENCY MEDICINE

## 2018-07-10 PROCEDURE — 81025 URINE PREGNANCY TEST: CPT | Performed by: EMERGENCY MEDICINE

## 2018-07-10 PROCEDURE — 81001 URINALYSIS AUTO W/SCOPE: CPT | Performed by: EMERGENCY MEDICINE

## 2018-07-10 PROCEDURE — 74011250637 HC RX REV CODE- 250/637: Performed by: EMERGENCY MEDICINE

## 2018-07-10 RX ORDER — DICYCLOMINE HYDROCHLORIDE 20 MG/1
20 TABLET ORAL EVERY 6 HOURS
Qty: 20 TAB | Refills: 0 | Status: SHIPPED | OUTPATIENT
Start: 2018-07-10 | End: 2018-07-15

## 2018-07-10 RX ORDER — ONDANSETRON 4 MG/1
4 TABLET, ORALLY DISINTEGRATING ORAL
Qty: 20 TAB | Refills: 0 | Status: ON HOLD | OUTPATIENT
Start: 2018-07-10 | End: 2019-10-31 | Stop reason: CLARIF

## 2018-07-10 RX ORDER — FAMOTIDINE 20 MG/1
20 TABLET, FILM COATED ORAL 2 TIMES DAILY
Qty: 20 TAB | Refills: 0 | Status: SHIPPED | OUTPATIENT
Start: 2018-07-10 | End: 2018-07-20

## 2018-07-10 RX ORDER — FAMOTIDINE 20 MG/1
20 TABLET, FILM COATED ORAL
Status: COMPLETED | OUTPATIENT
Start: 2018-07-10 | End: 2018-07-10

## 2018-07-10 RX ORDER — DICYCLOMINE HYDROCHLORIDE 10 MG/1
20 CAPSULE ORAL
Status: COMPLETED | OUTPATIENT
Start: 2018-07-10 | End: 2018-07-10

## 2018-07-10 RX ADMIN — DICYCLOMINE HYDROCHLORIDE 20 MG: 10 CAPSULE ORAL at 00:46

## 2018-07-10 RX ADMIN — FAMOTIDINE 20 MG: 20 TABLET ORAL at 00:46

## 2018-07-10 NOTE — ED NOTES
Pt arrives ambulatory to room c/o diffuse central abdominal pain and nausea since 8 pm tonight. Pt has no medical hx and denies fever, GI/ symptoms. Monitor x 2. Call bell in reach and plan of care discussed.

## 2018-07-10 NOTE — DISCHARGE INSTRUCTIONS
Thank you! Thank you for allowing us to provide you with excellent care today. We hope we addressed all of your concerns and needs. We strive to provide excellent quality care in the Emergency Department. You may receive a survey after your visit to evaluate the care you were provided. Should you receive a survey from us, we invite you to share your experience and tell us what made it excellent. It was a pleasure serving you, we invite you to share your experience with us, in our pursuit for excellence, should you be selected to receive a survey. If you feel that you have not received excellent quality care or timely care, please ask to speak to the nurse manager. Please choose us in the future for your continued health care needs. ------------------------------------------------------------------------------------------------------------  The exam and treatment you received in the Emergency Department were for an urgent problem and are not intended as complete care. It is important that you follow up with a doctor, nurse practitioner, or physician assistant for ongoing care. If your symptoms become worse or you do not improve as expected and you are unable to reach your usual health care provider, you should return to the Emergency Department. We are available 24 hours a day. Please take your discharge instructions with you when you go to your follow-up appointment. If you have any problem arranging a follow-up appointment, contact the Emergency Department immediately. If a prescription has been provided, please have it filled as soon as possible to prevent a delay in treatment. Read the entire medication instruction sheet provided to you by the pharmacy. If you have any questions or reservations about taking the medication due to side effects or interactions with other medications, please call your primary care physician or contact the ER to speak with the charge nurse.      Make an appointment with your family doctor or the physician you were referred to for follow-up of this visit as instructed on your discharge paperwork, as this is mandatory follow-up. Return to the ER if you are unable to be seen or if you are unable to be seen in a timely manner. If you have any problem arranging the follow-up visit, contact the Emergency Department immediately. Abdominal Pain: Care Instructions  Your Care Instructions    Abdominal pain has many possible causes. Some aren't serious and get better on their own in a few days. Others need more testing and treatment. If your pain continues or gets worse, you need to be rechecked and may need more tests to find out what is wrong. You may need surgery to correct the problem. Don't ignore new symptoms, such as fever, nausea and vomiting, urination problems, pain that gets worse, and dizziness. These may be signs of a more serious problem. Your doctor may have recommended a follow-up visit in the next 8 to 12 hours. If you are not getting better, you may need more tests or treatment. The doctor has checked you carefully, but problems can develop later. If you notice any problems or new symptoms, get medical treatment right away. Follow-up care is a key part of your treatment and safety. Be sure to make and go to all appointments, and call your doctor if you are having problems. It's also a good idea to know your test results and keep a list of the medicines you take. How can you care for yourself at home? · Rest until you feel better. · To prevent dehydration, drink plenty of fluids, enough so that your urine is light yellow or clear like water. Choose water and other caffeine-free clear liquids until you feel better. If you have kidney, heart, or liver disease and have to limit fluids, talk with your doctor before you increase the amount of fluids you drink.   · If your stomach is upset, eat mild foods, such as rice, dry toast or crackers, bananas, and applesauce. Try eating several small meals instead of two or three large ones. · Wait until 48 hours after all symptoms have gone away before you have spicy foods, alcohol, and drinks that contain caffeine. · Do not eat foods that are high in fat. · Avoid anti-inflammatory medicines such as aspirin, ibuprofen (Advil, Motrin), and naproxen (Aleve). These can cause stomach upset. Talk to your doctor if you take daily aspirin for another health problem. When should you call for help? Call 911 anytime you think you may need emergency care. For example, call if:  ? · You passed out (lost consciousness). ? · You pass maroon or very bloody stools. ? · You vomit blood or what looks like coffee grounds. ? · You have new, severe belly pain. ?Call your doctor now or seek immediate medical care if:  ? · Your pain gets worse, especially if it becomes focused in one area of your belly. ? · You have a new or higher fever. ? · Your stools are black and look like tar, or they have streaks of blood. ? · You have unexpected vaginal bleeding. ? · You have symptoms of a urinary tract infection. These may include:  ¨ Pain when you urinate. ¨ Urinating more often than usual.  ¨ Blood in your urine. ? · You are dizzy or lightheaded, or you feel like you may faint. ? Watch closely for changes in your health, and be sure to contact your doctor if:  ? · You are not getting better after 1 day (24 hours). Where can you learn more? Go to http://leora-heather.info/. Enter Z780 in the search box to learn more about \"Abdominal Pain: Care Instructions. \"  Current as of: March 20, 2017  Content Version: 11.4  © 7421-9121 [employer]Â®. Care instructions adapted under license by [employer] (which disclaims liability or warranty for this information).  If you have questions about a medical condition or this instruction, always ask your healthcare professional. David Gonzalez [facility] disclaims any warranty or liability for your use of this information. Nausea and Vomiting: Care Instructions  Your Care Instructions    When you are nauseated, you may feel weak and sweaty and notice a lot of saliva in your mouth. Nausea often leads to vomiting. Most of the time you do not need to worry about nausea and vomiting, but they can be signs of other illnesses. Two common causes of nausea and vomiting are stomach flu and food poisoning. Nausea and vomiting from viral stomach flu will usually start to improve within 24 hours. Nausea and vomiting from food poisoning may last from 12 to 48 hours. The doctor has checked you carefully, but problems can develop later. If you notice any problems or new symptoms, get medical treatment right away. Follow-up care is a key part of your treatment and safety. Be sure to make and go to all appointments, and call your doctor if you are having problems. It's also a good idea to know your test results and keep a list of the medicines you take. How can you care for yourself at home? · To prevent dehydration, drink plenty of fluids, enough so that your urine is light yellow or clear like water. Choose water and other caffeine-free clear liquids until you feel better. If you have kidney, heart, or liver disease and have to limit fluids, talk with your doctor before you increase the amount of fluids you drink. · Rest in bed until you feel better. · When you are able to eat, try clear soups, mild foods, and liquids until all symptoms are gone for 12 to 48 hours. Other good choices include dry toast, crackers, cooked cereal, and gelatin dessert, such as Jell-O. When should you call for help? Call 911 anytime you think you may need emergency care. For example, call if:  ? · You passed out (lost consciousness).    ?Call your doctor now or seek immediate medical care if:  ? · You have symptoms of dehydration, such as:  ¨ Dry eyes and a dry mouth.  ¨ Passing only a little dark urine. ¨ Feeling thirstier than usual.   ? · You have new or worsening belly pain. ? · You have a new or higher fever. ? · You vomit blood or what looks like coffee grounds. ? Watch closely for changes in your health, and be sure to contact your doctor if:  ? · You have ongoing nausea and vomiting. ? · Your vomiting is getting worse. ? · Your vomiting lasts longer than 2 days. ? · You are not getting better as expected. Where can you learn more? Go to http://leora-heather.info/. Enter 25 [id number] in the search box to learn more about \"Nausea and Vomiting: Care Instructions. \"  Current as of: March 20, 2017  Content Version: 11.4  © 1721-1181 [employer]. Care instructions adapted under license by [employer] (which disclaims liability or warranty for this information). If you have questions about a medical condition or this instruction, always ask your healthcare professional. [place] [address] any warranty or liability for your use of this information.

## 2018-07-10 NOTE — ED NOTES
Pt received discharge instructions from Dr. Viktoriya Zamora and verbalized understanding. Pt ambulatory to exit with a steady gait.

## 2018-07-10 NOTE — ED PROVIDER NOTES
EMERGENCY DEPARTMENT HISTORY AND PHYSICAL EXAM 
 
 
 
Date: 7/10/2018 Patient Name: Nena Kaur History of Presenting Illness Chief Complaint Patient presents with  Abdominal Pain  
  mid Abd pain x 1 hour, reports that occasionally will radiate to lower back, describes as \"gas pain\" ; has become more constant since onset, denies any urinary sx's, denies diarrhea or constipation  Nausea History Provided By: Patient HPI: Nena Kaur, 25 y.o. female, presents ambulatory to the ED with cc of persistent, sharp generalized abdominal pain radiating around to bilateral back x2000 last night. Pt states the pain was constant at the time of onset, but is currently intermittent. Pt reports associated nausea. Pt is otherwise healthy and denies any long standing illnesses or medications. She denies taking any medications for relief of symptoms or any other relieving or exacerbating factors. Pt specifically denies any fever, chills, cough, congestion, shortness of breath, chest pain, vomiting, diarrhea, dysuria, or urinary frequency. PSHx: Significant for lumpectomy Social Hx: -tobacco, -EtOH, -Illicit Drugs  
[id number] PCP: Shayne Gaines MD 
 
There are no other complaints, changes, or physical findings at this time. Current Outpatient Prescriptions Medication Sig Dispense Refill  dicyclomine (BENTYL) 20 mg tablet Take 1 Tab by mouth every six (6) hours for 20 doses. 20 Tab 0  
 ondansetron (ZOFRAN ODT) 4 mg disintegrating tablet Take 1 Tab by mouth every eight (8) hours as needed for Nausea. 20 Tab 0  
 famotidine (PEPCID) 20 mg tablet Take 1 Tab by mouth two (2) times a day for 10 days. 20 Tab 0  ibuprofen (MOTRIN) 600 mg tablet Take 1 Tab by mouth every eight (8) hours as needed for Pain. 36 Tab 2  PNV no.24-iron-folic acid-dha (PRENATAL DHA+COMPLETE PRENATAL) -300 mg-mcg-mg cmpk Take  by mouth. Past History Past Medical History: 
Past Medical History: Diagnosis Date  Breast disorder 6 yrs ago lumpectomy for fibroadnoma  Essential hypertension Gestational HTN Past Surgical History: 
Past Surgical History:  
Procedure Laterality Date  BREAST SURGERY PROCEDURE UNLISTED    
 lumpectomy for fibroadnoma6 years ago  HX CYST INCISION AND DRAINAGE Family History: 
Family History Problem Relation Age of Onset  Hypertension Maternal Grandmother  Diabetes Maternal Grandmother Social History: 
Social History Substance Use Topics  Smoking status: Never Smoker  Smokeless tobacco: Never Used  Alcohol use No  
 
 
Allergies: 
No Known Allergies Review of Systems Review of Systems Constitutional: Negative. Negative for chills and fever. HENT: Negative. Negative for congestion, facial swelling, rhinorrhea, sore throat, trouble swallowing and voice change. Eyes: Negative. Respiratory: Negative. Negative for apnea, cough, chest tightness, shortness of breath and wheezing. Cardiovascular: Negative. Negative for chest pain, palpitations and leg swelling. Gastrointestinal: Positive for abdominal pain and nausea. Negative for abdominal distention, blood in stool, constipation, diarrhea and vomiting. Endocrine: Negative. Negative for cold intolerance, heat intolerance and polyuria. Genitourinary: Negative. Negative for difficulty urinating, dysuria, flank pain, frequency, hematuria and urgency. Musculoskeletal: Negative. Negative for arthralgias, back pain, myalgias, neck pain and neck stiffness. Skin: Negative for color change and rash. Neurological: Negative. Negative for dizziness, syncope, facial asymmetry, speech difficulty, weakness, light-headedness, numbness and headaches. Hematological: Negative. Does not bruise/bleed easily. Psychiatric/Behavioral: Negative. Negative for confusion and self-injury. The patient is not nervous/anxious.    
 
 
Physical Exam  
Physical Exam Constitutional: She is oriented to person, place, and time. She appears well-developed and well-nourished. No distress. HENT:  
Head: Normocephalic and atraumatic. Mouth/Throat: Oropharynx is clear and moist. No oropharyngeal exudate. Eyes: Conjunctivae and EOM are normal. Pupils are equal, round, and reactive to light. Neck: Normal range of motion. Cardiovascular: Normal rate, regular rhythm and normal heart sounds. Exam reveals no gallop and no friction rub. No murmur heard. Pulmonary/Chest: Effort normal and breath sounds normal. No respiratory distress. She has no wheezes. She has no rales. She exhibits no tenderness. Abdominal: Soft. Bowel sounds are normal. She exhibits no distension and no mass. There is generalized tenderness. There is no rebound, no guarding, no tenderness at McBurney's point and negative Vernon's sign. Musculoskeletal: Normal range of motion. She exhibits no edema, tenderness or deformity. Neurological: She is alert and oriented to person, place, and time. She displays normal reflexes. No cranial nerve deficit. She exhibits normal muscle tone. Coordination normal.  
Skin: Skin is warm. No rash noted. She is not diaphoretic. Psychiatric: She has a normal mood and affect. Nursing note and vitals reviewed. Diagnostic Study Results Labs - Recent Results (from the past 12 hour(s)) CBC WITH AUTOMATED DIFF Collection Time: 07/09/18  9:25 PM  
Result Value Ref Range WBC 7.0 3.6 - 11.0 K/uL  
 RBC 4.64 3.80 - 5.20 M/uL  
 HGB 13.8 11.5 - 16.0 g/dL HCT 41.4 35.0 - 47.0 % MCV 89.2 80.0 - 99.0 FL  
 MCH 29.7 26.0 - 34.0 PG  
 MCHC 33.3 30.0 - 36.5 g/dL  
 RDW 12.3 11.5 - 14.5 % PLATELET 878 775 - 090 K/uL MPV 11.0 8.9 - 12.9 FL  
 NRBC 0.0 0  WBC ABSOLUTE NRBC 0.00 0.00 - 0.01 K/uL NEUTROPHILS 59 32 - 75 % LYMPHOCYTES 33 12 - 49 % MONOCYTES 5 5 - 13 % EOSINOPHILS 2 0 - 7 % BASOPHILS 1 0 - 1 %  IMMATURE GRANULOCYTES 0 0.0 - 0.5 % ABS. NEUTROPHILS 4.2 1.8 - 8.0 K/UL  
 ABS. LYMPHOCYTES 2.3 0.8 - 3.5 K/UL  
 ABS. MONOCYTES 0.4 0.0 - 1.0 K/UL  
 ABS. EOSINOPHILS 0.2 0.0 - 0.4 K/UL  
 ABS. BASOPHILS 0.0 0.0 - 0.1 K/UL  
 ABS. IMM. GRANS. 0.0 0.00 - 0.04 K/UL  
 DF AUTOMATED METABOLIC PANEL, COMPREHENSIVE Collection Time: 07/09/18  9:25 PM  
Result Value Ref Range Sodium 143 136 - 145 mmol/L Potassium 3.9 3.5 - 5.1 mmol/L Chloride 108 97 - 108 mmol/L  
 CO2 27 21 - 32 mmol/L Anion gap 8 5 - 15 mmol/L Glucose 109 (H) 65 - 100 mg/dL BUN 14 6 - 20 MG/DL Creatinine 0.74 0.55 - 1.02 MG/DL  
 BUN/Creatinine ratio 19 12 - 20 GFR est AA >60 >60 ml/min/1.73m2 GFR est non-AA >60 >60 ml/min/1.73m2 Calcium 9.5 8.5 - 10.1 MG/DL Bilirubin, total 0.2 0.2 - 1.0 MG/DL  
 ALT (SGPT) 20 12 - 78 U/L  
 AST (SGOT) 14 (L) 15 - 37 U/L Alk. phosphatase 84 45 - 117 U/L Protein, total 7.5 6.4 - 8.2 g/dL Albumin 4.4 3.5 - 5.0 g/dL Globulin 3.1 2.0 - 4.0 g/dL A-G Ratio 1.4 1.1 - 2.2 LIPASE Collection Time: 07/09/18  9:25 PM  
Result Value Ref Range Lipase 157 73 - 393 U/L  
URINALYSIS W/ REFLEX CULTURE Collection Time: 07/10/18 12:37 AM  
Result Value Ref Range Color YELLOW/STRAW Appearance CLEAR CLEAR Specific gravity 1.023 1.003 - 1.030    
 pH (UA) 7.0 5.0 - 8.0 Protein NEGATIVE  NEG mg/dL Glucose NEGATIVE  NEG mg/dL Ketone NEGATIVE  NEG mg/dL Bilirubin NEGATIVE  NEG Blood NEGATIVE  NEG Urobilinogen 0.2 0.2 - 1.0 EU/dL Nitrites NEGATIVE  NEG Leukocyte Esterase NEGATIVE  NEG    
 WBC 0-4 0 - 4 /hpf  
 RBC 0-5 0 - 5 /hpf Epithelial cells FEW FEW /lpf Bacteria 1+ (A) NEG /hpf  
 UA:UC IF INDICATED URINE CULTURE ORDERED (A) CNI Hyaline cast 0-2 0 - 5 /lpf  
HCG URINE, QL Collection Time: 07/10/18 12:37 AM  
Result Value Ref Range HCG urine, QL NEGATIVE  NEG Radiologic Studies - No orders to display CT Results  (Last 48 hours) None CXR Results  (Last 48 hours) None Medical Decision Making I am the first provider for this patient. I reviewed the vital signs, available nursing notes, past medical history, past surgical history, family history and social history. Vital Signs-Reviewed the patient's vital signs. Patient Vitals for the past 12 hrs: 
 Temp Pulse Resp BP SpO2  
07/09/18 2115 98 °F (36.7 °C) 86 16 139/81 100 % Pulse Oximetry Analysis - 100% on RA Cardiac Monitor:  
Rate: 86 bpm 
Rhythm: Normal Sinus Rhythm Records Reviewed: Nursing Notes and Old Medical Records Provider Notes (Medical Decision Making): DDx: pancreatitis, gastric ulcer disease, UTI , pregnancy 24 yo female presenting w/ mild abd pain and nausea. Vitals stable, exam benign. Obtain UA and labs. Will treat symptomatically. ED Course:  
Initial assessment performed. The patients presenting problems have been discussed, and they are in agreement with the care plan formulated and outlined with them. I have encouraged them to ask questions as they arise throughout their visit. PROGRESS NOTE: 
1:30 AM 
Pt reevaluated. Pt labs and UA have resulted negative. Will PO challenge and plan for d/c. Written by Mila Vazquez ED Scribe, as dictated by Nupur Flower MD  
 
Medications  
dicyclomine (BENTYL) capsule 20 mg (20 mg Oral Given 7/10/18 0046) famotidine (PEPCID) tablet 20 mg (20 mg Oral Given 7/10/18 0046) Progress note: 
1:44 AM 
Pt noted to be feeling better, ready for discharge. Updated pt and/or family on all final lab findings. Will follow up as instructed. All questions have been answered, pt voiced understanding and agreement with plan. Specific return precautions provided as well as instructions to return to the ED should sx worsen at any time. Vital signs stable for discharge.   
Written by ANIVAL Hernandezibe, as dictated by Nupur Flower MD 
 
Critical Care Time:  
none Disposition: 
Discharge Note: 
1:47 AM 
The pt is ready for discharge. The pt's signs, symptoms, diagnosis, and discharge instructions have been discussed and pt has conveyed their understanding. The pt is to follow up as recommended or return to ER should their symptoms worsen. Plan has been discussed and pt is in agreement. PLAN: 
1. Discharge Medication List as of 7/10/2018  1:35 AM  
  
CONTINUE these medications which have NOT CHANGED Details  
ibuprofen (MOTRIN) 600 mg tablet Take 1 Tab by mouth every eight (8) hours as needed for Pain., Print, Disp-36 Tab, R-2  
  
PNV no.24-iron-folic acid-dha (PRENATAL DHA+COMPLETE PRENATAL) -300 mg-mcg-mg cmpk Take  by mouth., Historical Med 2. Follow-up Information Follow up With Details Comments Contact Info Renetta Devries MD   [address] [place] [address] [phone] [facility] [place] [facility]  As needed, If symptoms worsen [address] [place] [address] [address] [facility] 
[phone] Return to ED if worse Diagnosis Clinical Impression: 1. Abdominal pain, generalized 2. Nausea without vomiting 3. Acute gastritis without hemorrhage, unspecified gastritis type Attestations: This note is prepared by Naun Ritter, acting as Scribe for Francesco Bernal MD 
 
 
The scribe's documentation has been prepared under my direction and personally reviewed by me in its entirety. I confirm that the note above accurately reflects all work, treatment, procedures, and medical decision making performed by me. Francesco Bernal MD 
 
 
 
This note will not be viewable in [address]

## 2018-07-11 LAB
BACTERIA SPEC CULT: NORMAL
CC UR VC: NORMAL
SERVICE CMNT-IMP: NORMAL

## 2019-04-04 LAB
CHLAMYDIA, EXTERNAL: NORMAL
HBSAG, EXTERNAL: NORMAL
HIV, EXTERNAL: NONREACTIVE
N. GONORRHEA, EXTERNAL: NORMAL
RPR, EXTERNAL: NEGATIVE
RUBELLA, EXTERNAL: 1.27

## 2019-07-05 NOTE — PROGRESS NOTES
Pt arrived to unit from office with SROM at approx 1445; clear fluid noted; nkda; pt is G1; [facility] 3/4/17; pt denies any complications with exception of mildly elevated BPs the past couple of weeks. @6946 Dr Omaira Anglin at bedside; discussed POC with pt; pt may ambulate; bedside US done to verify vertex position. Spoke with pt.  Reports a several months history of headaches on/off.  1 week h/o B/L pain in his back by ribs. Hurts to lie in bed. Denies any dysuria, frequency, change in urine appearance or odor.  Denies any swelling in legs or cough. Reviewed 6/14/19 phone message and Dr. Rivera's office had advised him to follow up with PCP for headaches. He was hospitalized in March for acute encephalopathy and had a head CT and brain MRI. Inquired with pt if he discussed headaches with Dr. Murguia and he did not. Advised pt I would review with nephrologist on call and get back in touch.

## 2019-08-22 LAB — ANTIBODY SCREEN, EXTERNAL: NORMAL

## 2019-10-17 LAB — GRBS, EXTERNAL: POSITIVE

## 2019-10-31 ENCOUNTER — HOSPITAL ENCOUNTER (EMERGENCY)
Age: 24
Discharge: HOME | End: 2019-10-31
Attending: OBSTETRICS & GYNECOLOGY | Admitting: OBSTETRICS & GYNECOLOGY
Payer: COMMERCIAL

## 2019-10-31 VITALS
OXYGEN SATURATION: 97 % | BODY MASS INDEX: 33.23 KG/M2 | HEART RATE: 86 BPM | WEIGHT: 176 LBS | HEIGHT: 61 IN | RESPIRATION RATE: 16 BRPM | DIASTOLIC BLOOD PRESSURE: 67 MMHG | TEMPERATURE: 98.9 F | SYSTOLIC BLOOD PRESSURE: 117 MMHG

## 2019-10-31 PROCEDURE — 74011250636 HC RX REV CODE- 250/636: Performed by: OBSTETRICS & GYNECOLOGY

## 2019-10-31 PROCEDURE — 96360 HYDRATION IV INFUSION INIT: CPT

## 2019-10-31 PROCEDURE — 59025 FETAL NON-STRESS TEST: CPT

## 2019-10-31 RX ORDER — SODIUM CHLORIDE, SODIUM LACTATE, POTASSIUM CHLORIDE, CALCIUM CHLORIDE 600; 310; 30; 20 MG/100ML; MG/100ML; MG/100ML; MG/100ML
1000 INJECTION, SOLUTION INTRAVENOUS ONCE
Status: COMPLETED | OUTPATIENT
Start: 2019-10-31 | End: 2019-10-31

## 2019-10-31 RX ADMIN — SODIUM CHLORIDE, POTASSIUM CHLORIDE, SODIUM LACTATE AND CALCIUM CHLORIDE 1000 ML/HR: 600; 310; 30; 20 INJECTION, SOLUTION INTRAVENOUS at 12:34

## 2019-10-31 NOTE — DISCHARGE INSTRUCTIONS
General Discharge Instructions    Patient ID:  Valerie Baker  [id number]  77 y.o.  1995    Patient Instructions    Take Home Medications     What to do at Home    Recommended diet: Regular Diet    Recommended activity: Activity as tolerated    Follow-up: keep scheduled appointment    Patient Education        Week 38 of Your Pregnancy: Care Instructions  Your Care Instructions    Believe it or not, your baby is almost here. You may have ideas about your baby's personality because of how much he or she moves. Or you may have noticed how he or she responds to sounds, warmth, cold, and light. You may even know what kind of music your baby likes. By now, you have a better idea of what to expect during delivery. You may have talked about your birth preferences with your doctor. But even if you want a vaginal birth, it is a good idea to learn about  births.  birth means that your baby is born through a cut (incision) in your lower belly. It is sometimes the best choice for the health of the baby and the mother. This care sheet can help you understand  births. It also gives you information about what to expect after your baby is born. And it helps you understand more about postpartum depression. Follow-up care is a key part of your treatment and safety. Be sure to make and go to all appointments, and call your doctor if you are having problems. It's also a good idea to know your test results and keep a list of the medicines you take. How can you care for yourself at home? Learn about  birth  · Most C-sections are unplanned. They are done because of problems that occur during labor. These problems might include:  ? Labor that slows or stops. ? High blood pressure or other problems for the mother. ? Signs of distress in the baby. These signs may include a very fast or slow heart rate. · Although most mothers and babies do well after , it is major surgery.  It has more risks than a vaginal delivery. · In some cases, a planned  may be safer than a vaginal delivery. This may be the case if:  ? The mother has a health problem, such as a heart condition. ? The baby isn't in a head-down position for delivery. This is called a breech position. ? The uterus has scars from past surgeries. This could increase the chance of a tear in the uterus. ? There is a problem with the placenta. ? The mother has an infection, such as genital herpes, that could be spread to the baby. ? The mother is having twins or more. ? The baby weighs 9 to 10 pounds or more. · Because of the risks of , planned C-sections generally should be done only for medical reasons. And a planned  should be done at 39 weeks or later unless there is a medical reason to do it sooner. Know what to expect after delivery, and plan for the first few weeks at home  · You, your baby, and your partner or  will get identification bands. Only people with matching bands can  the baby from the nursery. · You will learn how to feed, diaper, and bathe your baby. And you will learn how to care for the umbilical cord stump. If your baby will be circumcised, you will also learn how to care for that. · Ask people to wait to visit you until you are at home. And ask them to wash their hands before they touch your baby. · Make sure you have another adult in your home for at least 2 or 3 days after the birth. · During the first 2 weeks, limit when friends and family can visit. · Do not allow visitors who have colds or infections. Make sure all visitors are up to date with their vaccinations. Never let anyone smoke around your baby. · Try to nap when the baby naps. Be aware of postpartum depression  · \"Baby blues\" are common for the first 1 to 2 weeks after birth. You may cry or feel sad or irritable for no reason. · For some women, these feelings last longer and are more intense.  This is called postpartum depression. · If your symptoms last for more than a few weeks or you feel very depressed, ask your doctor for help. · Postpartum depression can be treated. Support groups and counseling can help. Sometimes medicine can also help. Where can you learn more? Go to http://leora-heather.info/. Enter B044 in the search box to learn more about \"Week 38 of Your Pregnancy: Care Instructions. \"  Current as of: May 29, 2019  Content Version: 12.2  © 2437-9361 [employer]. Care instructions adapted under license by [employer] (which disclaims liability or warranty for this information). If you have questions about a medical condition or this instruction, always ask your healthcare professional. [address] any warranty or liability for your use of this information.

## 2019-10-31 NOTE — PROGRESS NOTES
1230 pt arrived from office extended fetal monitoring for fetal tachycardia. Pt is a . Pt denies any vag bleeding or leakage of fluid. Pt placed on monitor. 1415: Dr. Meena Rocha into see pt, order received for discharge. 1430: pt given d/c instructions, pt d/c'd home.

## 2019-10-31 NOTE — H&P
History & Physical    Name: Jasmin Ibrahim MRN: [id number]  SSN: [id number]    YOB: 1995  Age: 25 y.o. Sex: female      Subjective:     Reason for Admission:  Pregnancy and fetal tachycardia    History of Present Illness: Ms. Bee Zafar is a 25 y.o.  at 38w0d, who was sent from the office for fetal tachycardia noted on doppler. Otherwise feels well. No CTX, VB, LOF. FM is normal.    Denies fever/chills or any other symptoms. Pregnancy c/b:  -GBS +    OB History    Para Term  AB Living   2 1 1     1   SAB TAB Ectopic Molar Multiple Live Births           0 1      # Outcome Date GA Lbr Jonnie/2nd Weight Sex Delivery Anes PTL Lv   2 Current            1 Term 17 39w5d 19:08 / 00:30 3.425 kg M Vag-Spont Local N GIGI     Past Medical History:   Diagnosis Date    Breast disorder     6 yrs ago lumpectomy for fibroadnoma    Essential hypertension     Gestational HTN     Past Surgical History:   Procedure Laterality Date    BREAST SURGERY PROCEDURE UNLISTED      lumpectomy for fibroadnoma6 years ago    HX CYST INCISION AND DRAINAGE       Social History     Occupational History    Not on file   Tobacco Use    Smoking status: Never Smoker    Smokeless tobacco: Never Used   Substance and Sexual Activity    Alcohol use: No    Drug use: No    Sexual activity: Yes      Family History   Problem Relation Age of Onset    Hypertension Maternal Grandmother     Diabetes Maternal Grandmother        No Known Allergies  Prior to Admission medications    Medication Sig Start Date End Date Taking? Authorizing Provider   PNV no.24-iron-folic acid-dha (PRENATAL DHA+COMPLETE PRENATAL) [id number] mg-mcg-mg cmpk Take  by mouth. Yes Provider, Historical        Review of Systems:  A comprehensive review of systems was negative except for that written in the History of Present Illness.      Objective:     Vitals:    Vitals:    10/31/19 1234 10/31/19 1344   BP: 117/67    Pulse: 86    Resp: 16 Temp: 98.9 °F (37.2 °C)    SpO2: 97%    Weight:  79.8 kg (176 lb)   Height:  5' 1\" (1.549 m)      Temp (24hrs), Av.9 °F (37.2 °C), Min:98.9 °F (37.2 °C), Max:98.9 °F (37.2 °C)    BP  Min: 117/67  Max: 117/67     Physical Exam:  Patient without distress. Heart: Regular rate and rhythm  Lung: normal respiratory effort  Abdomen: soft, nontender  Fundus: soft and non tender  Perineum: blood absent, amniotic fluid absent  Lower Extremities: no edema     Membranes:  Intact  Uterine Activity:  None  Fetal Heart Rate:  Reactive. Monitored ~1 hour. Baseline 140-150s, mod variability, many accels, no decels       Lab/Data Review:  No results found for this or any previous visit (from the past 24 hour(s)). Assessment and Plan:     25 y.o.  at 38w0d with fetal tachycardia in office, not noted on extended monitoring.    -Patient given 1 L IVF bolus in triage  -Extended monitoring for 1 hour without tachycardia noted. Reactive.    -Reviewed precautions  -F/u next week as scheduled

## 2019-11-07 ENCOUNTER — HOSPITAL ENCOUNTER (INPATIENT)
Age: 24
LOS: 3 days | Discharge: HOME | End: 2019-11-10
Attending: OBSTETRICS & GYNECOLOGY | Admitting: OBSTETRICS & GYNECOLOGY
Payer: COMMERCIAL

## 2019-11-07 DIAGNOSIS — Z34.90 TERM PREGNANCY: Primary | ICD-10-CM

## 2019-11-07 LAB
BASOPHILS # BLD: 0 K/UL (ref 0–0.1)
BASOPHILS NFR BLD: 0 % (ref 0–1)
DIFFERENTIAL METHOD BLD: ABNORMAL
EOSINOPHIL # BLD: 0.1 K/UL (ref 0–0.4)
EOSINOPHIL NFR BLD: 1 % (ref 0–7)
ERYTHROCYTE [DISTWIDTH] IN BLOOD BY AUTOMATED COUNT: 13.7 % (ref 11.5–14.5)
HCT VFR BLD AUTO: 35.9 % (ref 35–47)
HGB BLD-MCNC: 12.6 G/DL (ref 11.5–16)
IMM GRANULOCYTES # BLD AUTO: 0.2 K/UL (ref 0–0.04)
IMM GRANULOCYTES NFR BLD AUTO: 2 % (ref 0–0.5)
LYMPHOCYTES # BLD: 2 K/UL (ref 0.8–3.5)
LYMPHOCYTES NFR BLD: 17 % (ref 12–49)
MCH RBC QN AUTO: 32.6 PG (ref 26–34)
MCHC RBC AUTO-ENTMCNC: 35.1 G/DL (ref 30–36.5)
MCV RBC AUTO: 93 FL (ref 80–99)
MONOCYTES # BLD: 0.6 K/UL (ref 0–1)
MONOCYTES NFR BLD: 5 % (ref 5–13)
NEUTS SEG # BLD: 9.1 K/UL (ref 1.8–8)
NEUTS SEG NFR BLD: 75 % (ref 32–75)
NRBC # BLD: 0 K/UL (ref 0–0.01)
NRBC BLD-RTO: 0 PER 100 WBC
PLATELET # BLD AUTO: 162 K/UL (ref 150–400)
PMV BLD AUTO: 11.1 FL (ref 8.9–12.9)
RBC # BLD AUTO: 3.86 M/UL (ref 3.8–5.2)
WBC # BLD AUTO: 12 K/UL (ref 3.6–11)

## 2019-11-07 PROCEDURE — 75410000002 HC LABOR FEE PER 1 HR

## 2019-11-07 PROCEDURE — 77030028565 HC CATH CERV RIPNG BLN COOK -B

## 2019-11-07 PROCEDURE — 65410000002 HC RM PRIVATE OB

## 2019-11-07 PROCEDURE — 85025 COMPLETE CBC W/AUTO DIFF WBC: CPT

## 2019-11-07 PROCEDURE — 36415 COLL VENOUS BLD VENIPUNCTURE: CPT

## 2019-11-07 RX ORDER — SODIUM CHLORIDE 0.9 % (FLUSH) 0.9 %
5-40 SYRINGE (ML) INJECTION AS NEEDED
Status: DISCONTINUED | OUTPATIENT
Start: 2019-11-07 | End: 2019-11-10 | Stop reason: HOSPADM

## 2019-11-07 RX ORDER — SODIUM CHLORIDE, SODIUM LACTATE, POTASSIUM CHLORIDE, CALCIUM CHLORIDE 600; 310; 30; 20 MG/100ML; MG/100ML; MG/100ML; MG/100ML
125 INJECTION, SOLUTION INTRAVENOUS CONTINUOUS
Status: DISCONTINUED | OUTPATIENT
Start: 2019-11-07 | End: 2019-11-10 | Stop reason: HOSPADM

## 2019-11-07 RX ORDER — NALOXONE HYDROCHLORIDE 0.4 MG/ML
0.4 INJECTION, SOLUTION INTRAMUSCULAR; INTRAVENOUS; SUBCUTANEOUS AS NEEDED
Status: DISCONTINUED | OUTPATIENT
Start: 2019-11-07 | End: 2019-11-08

## 2019-11-07 RX ORDER — SODIUM CHLORIDE 0.9 % (FLUSH) 0.9 %
5-40 SYRINGE (ML) INJECTION EVERY 8 HOURS
Status: DISCONTINUED | OUTPATIENT
Start: 2019-11-07 | End: 2019-11-10 | Stop reason: HOSPADM

## 2019-11-07 NOTE — H&P
History & Physical    Name: Beth Sampson MRN: [id number]  SSN: [id number]    YOB: 1995  Age: 25 y.o. Sex: female      Subjective:     Estimated Date of Delivery: 19  OB History    Para Term  AB Living   2 1 1     1   SAB TAB Ectopic Molar Multiple Live Births           0 1      # Outcome Date GA Lbr Jonnie/2nd Weight Sex Delivery Anes PTL Lv   2 Current            1 Term 17 39w5d 19:08 / 00:30 3.425 kg Valencia Sequin       Ms. Zully Franco is a  admitted with pregnancy at 39w0d for induction of labor due to elective reasons. Prenatal course was normal.  Please see prenatal records for details. Past Medical History:   Diagnosis Date    Breast disorder     6 yrs ago lumpectomy for fibroadnoma    Essential hypertension     Gestational HTN     Past Surgical History:   Procedure Laterality Date    BREAST SURGERY PROCEDURE UNLISTED      lumpectomy for fibroadnoma6 years ago    HX CYST INCISION AND DRAINAGE       Social History     Occupational History    Not on file   Tobacco Use    Smoking status: Never Smoker    Smokeless tobacco: Never Used   Substance and Sexual Activity    Alcohol use: No    Drug use: No    Sexual activity: Yes     Family History   Problem Relation Age of Onset    Hypertension Maternal Grandmother     Diabetes Maternal Grandmother        No Known Allergies  Prior to Admission medications    Medication Sig Start Date End Date Taking? Authorizing Provider   PNV no.24-iron-folic acid-dha (PRENATAL DHA+COMPLETE PRENATAL) [id number] mg-mcg-mg cmpk Take  by mouth. Provider, Historical        Review of Systems: A comprehensive review of systems was negative except for that written in the History of Present Illness. Objective:     Vitals: There were no vitals filed for this visit. Physical Exam:  Patient without distress.   Heart: Regular rate and rhythm  Lung: clear to auscultation throughout lung fields, no wheezes, no rales, no rhonchi and normal respiratory effort  Abdomen: soft, nontender  Membranes:  Intact  Fetal Heart Rate: Reactive          Prenatal Labs:   Lab Results   Component Value Date/Time    Rubella, External Immune 07/29/2016    HBsAg, External Negataive 07/29/2016    HIV, External Negative 07/29/2016    Gonorrhea, External Negative 07/29/2016    Chlamydia, External Negative 07/29/2016    ABO,Rh O Positive 07/29/2016    GrBStrep, External Positive 02/03/2017       Impression/Plan:     Active Problems:    * No active hospital problems. *       Plan: Admit for induction of labor. Group B Strep positive, will treat prophylactically with penicillin when in labor  -plan Cook cervical ripening balloon overnight- remove in am and start Pitocin  -intermittent monitoring if FHR tracing Category 1 and not on Pitocin  -cbc, STBB.

## 2019-11-07 NOTE — PROGRESS NOTES
Pt arrived from home for scheduled cervical ripping. Pt is a , 39w0d. Pt placed on monitor. Dr. Shanell Butts aware of pt.

## 2019-11-07 NOTE — PROGRESS NOTES
19 1855   Cervical Exam   Dilation (cm)   (1-2cm)   Eff 50 %   Station -3   Baby Position Vertex    39 wks for elective induction. Using sterile technique the [employer] cervical ripening balloon was placed and both balloons filled with 60 cc. Patient tolerated well.    - remove in 12 hours and start Pitocin  -intermittent monitoring if remains Category 1  -PCN when in labor for gbs

## 2019-11-08 PROCEDURE — 75410000000 HC DELIVERY VAGINAL/SINGLE

## 2019-11-08 PROCEDURE — 74011000250 HC RX REV CODE- 250

## 2019-11-08 PROCEDURE — 74011250636 HC RX REV CODE- 250/636: Performed by: OBSTETRICS & GYNECOLOGY

## 2019-11-08 PROCEDURE — 65410000002 HC RM PRIVATE OB

## 2019-11-08 PROCEDURE — 75410000003 HC RECOV DEL/VAG/CSECN EA 0.5 HR

## 2019-11-08 PROCEDURE — 10907ZC DRAINAGE OF AMNIOTIC FLUID, THERAPEUTIC FROM PRODUCTS OF CONCEPTION, VIA NATURAL OR ARTIFICIAL OPENING: ICD-10-PCS | Performed by: OBSTETRICS & GYNECOLOGY

## 2019-11-08 PROCEDURE — 77030031139 HC SUT VCRL2 J&J -A

## 2019-11-08 PROCEDURE — 74011000258 HC RX REV CODE- 258: Performed by: OBSTETRICS & GYNECOLOGY

## 2019-11-08 PROCEDURE — 3E033VJ INTRODUCTION OF OTHER HORMONE INTO PERIPHERAL VEIN, PERCUTANEOUS APPROACH: ICD-10-PCS | Performed by: OBSTETRICS & GYNECOLOGY

## 2019-11-08 PROCEDURE — 75410000002 HC LABOR FEE PER 1 HR

## 2019-11-08 PROCEDURE — 0UQMXZZ REPAIR VULVA, EXTERNAL APPROACH: ICD-10-PCS | Performed by: OBSTETRICS & GYNECOLOGY

## 2019-11-08 PROCEDURE — 77030021125

## 2019-11-08 PROCEDURE — 74011250637 HC RX REV CODE- 250/637: Performed by: OBSTETRICS & GYNECOLOGY

## 2019-11-08 PROCEDURE — 0HQ9XZZ REPAIR PERINEUM SKIN, EXTERNAL APPROACH: ICD-10-PCS | Performed by: OBSTETRICS & GYNECOLOGY

## 2019-11-08 PROCEDURE — 74011250636 HC RX REV CODE- 250/636

## 2019-11-08 RX ORDER — ONDANSETRON 2 MG/ML
INJECTION INTRAMUSCULAR; INTRAVENOUS
Status: DISPENSED
Start: 2019-11-08 | End: 2019-11-09

## 2019-11-08 RX ORDER — ONDANSETRON 2 MG/ML
4 INJECTION INTRAMUSCULAR; INTRAVENOUS ONCE
Status: COMPLETED | OUTPATIENT
Start: 2019-11-08 | End: 2019-11-08

## 2019-11-08 RX ORDER — DOCUSATE SODIUM 100 MG/1
100 CAPSULE, LIQUID FILLED ORAL
Status: DISCONTINUED | OUTPATIENT
Start: 2019-11-08 | End: 2019-11-10 | Stop reason: HOSPADM

## 2019-11-08 RX ORDER — NALBUPHINE HYDROCHLORIDE 10 MG/ML
INJECTION, SOLUTION INTRAMUSCULAR; INTRAVENOUS; SUBCUTANEOUS
Status: DISPENSED
Start: 2019-11-08 | End: 2019-11-09

## 2019-11-08 RX ORDER — NALBUPHINE HYDROCHLORIDE 10 MG/ML
10 INJECTION, SOLUTION INTRAMUSCULAR; INTRAVENOUS; SUBCUTANEOUS ONCE
Status: COMPLETED | OUTPATIENT
Start: 2019-11-08 | End: 2019-11-08

## 2019-11-08 RX ORDER — OXYTOCIN/0.9 % SODIUM CHLORIDE 30/500 ML
0-25 PLASTIC BAG, INJECTION (ML) INTRAVENOUS
Status: DISCONTINUED | OUTPATIENT
Start: 2019-11-08 | End: 2019-11-10 | Stop reason: HOSPADM

## 2019-11-08 RX ORDER — OXYTOCIN/0.9 % SODIUM CHLORIDE 30/500 ML
PLASTIC BAG, INJECTION (ML) INTRAVENOUS
Status: DISPENSED
Start: 2019-11-08 | End: 2019-11-08

## 2019-11-08 RX ORDER — OXYTOCIN IN 5 % DEXTROSE 30/500 ML
1 PLASTIC BAG, INJECTION (ML) INTRAVENOUS
Status: DISCONTINUED | OUTPATIENT
Start: 2019-11-08 | End: 2019-11-08

## 2019-11-08 RX ORDER — OXYTOCIN IN 5 % DEXTROSE 30/500 ML
0-25 PLASTIC BAG, INJECTION (ML) INTRAVENOUS
Status: DISCONTINUED | OUTPATIENT
Start: 2019-11-08 | End: 2019-11-08

## 2019-11-08 RX ORDER — NALOXONE HYDROCHLORIDE 0.4 MG/ML
0.4 INJECTION, SOLUTION INTRAMUSCULAR; INTRAVENOUS; SUBCUTANEOUS AS NEEDED
Status: DISCONTINUED | OUTPATIENT
Start: 2019-11-08 | End: 2019-11-10 | Stop reason: HOSPADM

## 2019-11-08 RX ORDER — OXYCODONE AND ACETAMINOPHEN 5; 325 MG/1; MG/1
1 TABLET ORAL
Status: DISCONTINUED | OUTPATIENT
Start: 2019-11-08 | End: 2019-11-10 | Stop reason: HOSPADM

## 2019-11-08 RX ORDER — ACETAMINOPHEN 325 MG/1
650 TABLET ORAL
Status: DISCONTINUED | OUTPATIENT
Start: 2019-11-08 | End: 2019-11-10 | Stop reason: HOSPADM

## 2019-11-08 RX ORDER — IBUPROFEN 400 MG/1
800 TABLET ORAL EVERY 8 HOURS
Status: DISCONTINUED | OUTPATIENT
Start: 2019-11-08 | End: 2019-11-10 | Stop reason: HOSPADM

## 2019-11-08 RX ORDER — DIPHENHYDRAMINE HCL 25 MG
25 CAPSULE ORAL
Status: DISCONTINUED | OUTPATIENT
Start: 2019-11-08 | End: 2019-11-10 | Stop reason: HOSPADM

## 2019-11-08 RX ORDER — LIDOCAINE HYDROCHLORIDE 10 MG/ML
INJECTION, SOLUTION EPIDURAL; INFILTRATION; INTRACAUDAL; PERINEURAL
Status: COMPLETED
Start: 2019-11-08 | End: 2019-11-08

## 2019-11-08 RX ORDER — HYDROCORTISONE ACETATE PRAMOXINE HCL 2.5; 1 G/100G; G/100G
CREAM TOPICAL AS NEEDED
Status: DISCONTINUED | OUTPATIENT
Start: 2019-11-08 | End: 2019-11-10 | Stop reason: HOSPADM

## 2019-11-08 RX ORDER — OXYTOCIN/RINGER'S LACTATE 20/1000 ML
125-500 PLASTIC BAG, INJECTION (ML) INTRAVENOUS ONCE
Status: ACTIVE | OUTPATIENT
Start: 2019-11-08 | End: 2019-11-09

## 2019-11-08 RX ORDER — SIMETHICONE 80 MG
80 TABLET,CHEWABLE ORAL
Status: DISCONTINUED | OUTPATIENT
Start: 2019-11-08 | End: 2019-11-10 | Stop reason: HOSPADM

## 2019-11-08 RX ORDER — ONDANSETRON 4 MG/1
4 TABLET, ORALLY DISINTEGRATING ORAL
Status: ACTIVE | OUTPATIENT
Start: 2019-11-08 | End: 2019-11-09

## 2019-11-08 RX ADMIN — PENICILLIN G POTASSIUM 2.5 MILLION UNITS: 20000000 POWDER, FOR SOLUTION INTRAVENOUS at 11:01

## 2019-11-08 RX ADMIN — SODIUM CHLORIDE 5 MILLION UNITS: 900 INJECTION, SOLUTION INTRAVENOUS at 06:27

## 2019-11-08 RX ADMIN — NALBUPHINE HYDROCHLORIDE 10 MG: 10 INJECTION, SOLUTION INTRAMUSCULAR; INTRAVENOUS; SUBCUTANEOUS at 12:45

## 2019-11-08 RX ADMIN — LIDOCAINE HYDROCHLORIDE: 10 INJECTION, SOLUTION EPIDURAL; INFILTRATION; INTRACAUDAL; PERINEURAL at 15:45

## 2019-11-08 RX ADMIN — SODIUM CHLORIDE, SODIUM LACTATE, POTASSIUM CHLORIDE, AND CALCIUM CHLORIDE 125 ML/HR: 600; 310; 30; 20 INJECTION, SOLUTION INTRAVENOUS at 06:27

## 2019-11-08 RX ADMIN — OXYTOCIN-SODIUM CHLORIDE 0.9% IV SOLN 30 UNIT/500ML 1 MILLI-UNITS/MIN: 30-0.9/5 SOLUTION at 07:10

## 2019-11-08 RX ADMIN — IBUPROFEN 800 MG: 400 TABLET ORAL at 17:27

## 2019-11-08 RX ADMIN — ONDANSETRON 4 MG: 2 INJECTION INTRAMUSCULAR; INTRAVENOUS at 12:40

## 2019-11-08 RX ADMIN — PENICILLIN G POTASSIUM 2.5 MILLION UNITS: 20000000 POWDER, FOR SOLUTION INTRAVENOUS at 14:00

## 2019-11-08 NOTE — L&D DELIVERY NOTE
Delivery Summary  Patient: Tin Scott             Circumcision:   NA-female  Additional Delivery Comments - Elective IOL at 39wks. Cook catheter-->pitocin/arom-->progressed along normal curve to complete without anesthesia. Pushed with a few sets of contractions. Uncomplicated . Infant placed on mothers abdomen and cord clamping delayed 90s and cut by FOB. 2nd degree and right labial lac. Spontaneous placenta. EBL 200cc. Baby girl \"Lillia\"      Information for the patient's :  Margarito Denney [[id number]]       Labor Events:    Labor: No    Steroids:     Cervical Ripening Date/Time:       Cervical Ripening Type: None   Antibiotics During Labor: Yes   Rupture Identifier: Sac 1    Rupture Date/Time: 2019 10:45 AM   Rupture Type: AROM   Amniotic Fluid Volume:  Moderate    Amniotic Fluid Description: Clear    Amniotic Fluid Odor: None    Induction: Mejia Bulb (balloon)       Induction Date/Time: 2019 7:30 PM    Indications for Induction: Elective    Augmentation: Oxytocin   Augmentation Date/Time:      Indications for Augmentation:     Labor complications: None       Additional complications:        Delivery Events:  Indications For Episiotomy:     Episiotomy: None   Perineal Laceration(s): 2nd   Repaired: Yes   Periurethral Laceration Location:      Repaired:     Labial Laceration Location: right   Repaired:     Sulcal Laceration Location:     Repaired:     Vaginal Laceration Location:     Repaired:     Cervical Laceration Location:     Repaired:     Repair Suture: Vicryl 2-0;Vicryl 3-0   Number of Repair Packets: 2   Estimated Blood Loss (ml): 200ml     Delivery Date: 2019    Delivery Time: 3:37 PM  Delivery Type: Vaginal, Spontaneous  Sex:  Female    Gestational Age: 36w3d   Delivery Clinician:  Ky Larson  Living Status: Living   Delivery Location: L&D            APGARS  One minute Five minutes Ten minutes   Skin color: 1   1        Heart rate: 2   2 Grimace: 2   2        Muscle tone: 2   2        Breathin   2        Totals: 9   9            Presentation: Vertex    Position:        Resuscitation Method:  None     Meconium Stained: None      Cord Information: 3 Vessels  Complications: Nuchal Cord With Compressions  Cord around:    Delayed cord clamping? Yes  Cord clamped date/time:2019  3:39 PM  Disposition of Cord Blood: Lab    Blood Gases Sent?: No    Placenta:  Date/Time: 2019  3:41 PM  Removal: Spontaneous      Appearance: Normal     [place] Measurements:  Birth Weight: 3.37 kg      Birth Length: 50.8 cm      Head Circumference: 31.5 cm      Chest Circumference: 33.5 cm     Abdominal Girth: 32 cm    Other Providers:   EL ORTIZ;TYRESE DUFFY;DARIANA DICK;;;;;;;, Obstetrician;Primary Nurse;Primary  Nurse;Nicu Nurse;Neonatologist;Anesthesiologist;Crna;Nurse Practitioner;Midwife;Nursery Nurse           Cord pH:  none    Episiotomy: None   Laceration(s): 2nd     Estimated Blood Loss (ml): 200    Labor Events  Method:  Mejia Bulb (balloon)      Augmentation: Oxytocin   Cervical Ripening:       None        Hospital Problems  Date Reviewed: 2019          Codes Class Noted POA    Term pregnancy ICD-10-CM: Z34.90  ICD-9-CM: V22.1  2019 Unknown              Operative Vaginal Delivery - none    Group B Strep:   Lab Results   Component Value Date/Time    GrBStrep, External positive 10/17/2019     Information for the patient's :  Ervin Mosqueda [[id number]]   No results found for: ABORH, PCTABR, PCTDIG, BILI, ABORHEXT, ABORH    No results found for: APH, APCO2, APO2, AHCO3, ABEC, ABDC, O2ST, EPHV, PCO2V, PO2V, HCO3V, EBEV, EBDV, SITE, RSCOM

## 2019-11-08 NOTE — PROGRESS NOTES
Feeling more pressure  Visit Vitals  /79   Pulse 88   Temp 97.9 °F (36.6 °C)   Resp 18   Ht 5' 1\" (1.549 m)   Wt 81.2 kg (179 lb)   SpO2 96%   Breastfeeding?  Yes   BMI 33.82 kg/m²     , moderate variability, cat I  toco q 2-4 min  SVE 8/c/0    - continue pitocin  - anticipate

## 2019-11-08 NOTE — PROGRESS NOTES
Pt is playing cards with SO. Pt states she is feeling some contractions here recently. 2232 pt denies any needs- appears to be sleeping. So at bedside. 0132 sleeping    0600 sena water removed x 2 of 60 cc each and sena removed- no bleeding. Then pt up to BR. Pt reports she noticed some contractions then spaced    0730 Bedside report given toHUBER Morel RN. Chart reviewed and care turned over.

## 2019-11-08 NOTE — PROGRESS NOTES
In to see patient. Not really feeling contractions  Visit Vitals  /77   Pulse 80   Temp 98.3 °F (36.8 °C)   Resp 18   Ht 5' 1\" (1.549 m)   Wt 81.2 kg (179 lb)   SpO2 97%   Breastfeeding? Yes   BMI 33.82 kg/m²     , moderate variability, cat I  [place] quiet  SVE 3/70/-2, AROM clear fluid. A/P: 24 yo  @ 39w1d, IOL  - IOL - sp becky, now on pitocin/AROM.  Declines epidural  - FSR/vtx/gbs pos on pcn

## 2019-11-09 PROCEDURE — 65410000002 HC RM PRIVATE OB

## 2019-11-09 PROCEDURE — 74011250637 HC RX REV CODE- 250/637: Performed by: OBSTETRICS & GYNECOLOGY

## 2019-11-09 RX ORDER — OXYCODONE AND ACETAMINOPHEN 5; 325 MG/1; MG/1
1-2 TABLET ORAL
Qty: 10 TAB | Refills: 0 | Status: SHIPPED | OUTPATIENT
Start: 2019-11-09 | End: 2019-11-12

## 2019-11-09 RX ORDER — IBUPROFEN 800 MG/1
800 TABLET ORAL
Qty: 30 TAB | Refills: 1 | Status: SHIPPED | OUTPATIENT
Start: 2019-11-09 | End: 2021-08-23

## 2019-11-09 RX ADMIN — IBUPROFEN 800 MG: 400 TABLET ORAL at 17:12

## 2019-11-09 RX ADMIN — IBUPROFEN 800 MG: 400 TABLET ORAL at 01:21

## 2019-11-09 RX ADMIN — IBUPROFEN 800 MG: 400 TABLET ORAL at 09:02

## 2019-11-09 NOTE — ROUTINE PROCESS
Bedside and Verbal shift change report given to GINGER Manriquez RN (oncoming nurse) by Mandie Gallagher RN (offgoing nurse). Report included the following information SBAR.

## 2019-11-09 NOTE — PROGRESS NOTES
Post-Partum Day Number 1 Progress Note    Joseluis Valle     Assessment: Doing well, post partum day 1    Plan:  1. Continue routine postpartum and perineal care as well as maternal education. 2. Would like to go home if baby can go. This is fine from [facility] standpoint, will place scripts and d/c order can be placed when decision is made. Information for the patient's :  Radha Pascal [[id number]]   Vaginal, Spontaneous   Patient doing well without significant complaint. Voiding without difficulty, normal lochia. Vitals:  Visit Vitals  /64 (BP 1 Location: Left arm, BP Patient Position: At rest)   Pulse 75   Temp 98.3 °F (36.8 °C)   Resp 16   Ht 5' 1\" (1.549 m)   Wt 81.2 kg (179 lb)   SpO2 97%   Breastfeeding? Yes   BMI 33.82 kg/m²     Temp (24hrs), Av.1 °F (36.7 °C), Min:97.9 °F (36.6 °C), Max:98.3 °F (36.8 °C)        Exam:   Patient without distress. Abdomen soft, fundus firm, nontender                Perineum with normal lochia noted. Lower extremities are negative for swelling, cords or tenderness. Labs:     Lab Results   Component Value Date/Time    WBC 12.0 (H) 2019 06:33 PM    WBC 7.0 2018 09:25 PM    WBC 11.9 (H) 2017 05:13 PM    WBC 5.5 2014 09:52 AM    HGB 12.6 2019 06:33 PM    HGB 13.8 2018 09:25 PM    HGB 13.7 2017 05:13 PM    HGB 13.4 2014 09:52 AM    HCT 35.9 2019 06:33 PM    HCT 41.4 2018 09:25 PM    HCT 39.4 2017 05:13 PM    HCT 41.3 2014 09:52 AM    PLATELET 443  06:33 PM    PLATELET 191  09:25 PM    PLATELET 684  05:13 PM    PLATELET 066  09:52 AM       No results found for this or any previous visit (from the past 24 hour(s)).

## 2019-11-10 VITALS
DIASTOLIC BLOOD PRESSURE: 77 MMHG | RESPIRATION RATE: 18 BRPM | BODY MASS INDEX: 33.79 KG/M2 | HEIGHT: 61 IN | OXYGEN SATURATION: 97 % | HEART RATE: 67 BPM | TEMPERATURE: 98.4 F | WEIGHT: 179 LBS | SYSTOLIC BLOOD PRESSURE: 138 MMHG

## 2019-11-10 PROCEDURE — 74011250637 HC RX REV CODE- 250/637: Performed by: OBSTETRICS & GYNECOLOGY

## 2019-11-10 RX ADMIN — IBUPROFEN 800 MG: 400 TABLET ORAL at 01:01

## 2019-11-10 RX ADMIN — IBUPROFEN 800 MG: 400 TABLET ORAL at 09:46

## 2019-11-10 NOTE — LACTATION NOTE
This note was copied from a baby's chart. P2   39 1/7 gestation. Pt will successfully establish breastfeeding by feeding in response to early feeding cues or wake every 3h, will obtain deep latch, and will keep log of feedings/output. Taught to BF at hunger cues and or q 2-3 hrs and to offer 10-20 drops of hand expressed colostrum at any non-feeds. Breast Assessment  Left Breast: Small , Medium  Left Nipple: Everted, Intact  Right Breast: Small , Medium  Right Nipple: Intact, Everted  Breast- Feeding Assessment  Attends Breast-Feeding Classes: No  Breast-Feeding Experience: Yes(2 years and 3 months with 1st )  Breast Trauma/Surgery: No  Type/Quality: Good  Lactation Consultant Visits  Breast-Feedings: Good      LATCH Documentation  Latch: Grasps breast, tongue down, lips flanged, rhythmic sucking  Audible Swallowing: Spontaneous and intermittent (24 hours old)  Type of Nipple: Everted (after stimulation)  Comfort (Breast/Nipple): Soft/non-tender  Hold (Positioning): No assist from staff, mother able to position/hold infant  LATCH Score: 10  Manual massage, compression and expression of milk instructed to lead each feeding. Benefits of increasing milk production as well as milk transfer to baby with this low tech but highly effective stimulation process reviewed. Expect success. Warmline and support group information provided. Call prn.     1430 In NBN to initiate phototherapy. 20 ml formula given per mother's choice/she is discharged and going on an errand with family/returning for next feeding. A symphony pump kit and pump provided for prn use/will continue to breast feed when she returns.

## 2019-11-10 NOTE — PROGRESS NOTES
Post-Partum Day Number 2 Progress Note    [facility] [place]     Assessment: Doing well, post partum day 2    Plan:   1. Discharge home today  2. Follow up in office in 6 weeks with Yobany Deutsch MD  3. Post partum activity advised, diet as tolerated  4. Discharge Medications: ibuprofen, percocet and medications prior to admission    Information for the patient's :  Abel Douglas [[id number]]   Vaginal, Spontaneous   Patient doing well without significant complaint. Voiding without difficulty, normal lochia. Vitals:  Visit Vitals  /77 (BP 1 Location: Left arm, BP Patient Position: At rest)   Pulse 67   Temp 98.4 °F (36.9 °C)   Resp 18   Ht 5' 1\" (1.549 m)   Wt 81.2 kg (179 lb)   SpO2 97%   Breastfeeding? Yes   BMI 33.82 kg/m²     Temp (24hrs), Av.3 °F (36.8 °C), Min:98 °F (36.7 °C), Max:98.7 °F (37.1 °C)      Exam:         Patient without distress. Abdomen soft, fundus firm, nontender                 Lower extremities are negative for swelling, cords or tenderness. Labs:     Lab Results   Component Value Date/Time    WBC 12.0 (H) 2019 06:33 PM    WBC 7.0 2018 09:25 PM    WBC 11.9 (H) 2017 05:13 PM    WBC 5.5 2014 09:52 AM    HGB 12.6 2019 06:33 PM    HGB 13.8 2018 09:25 PM    HGB 13.7 2017 05:13 PM    HGB 13.4 2014 09:52 AM    HCT 35.9 2019 06:33 PM    HCT 41.4 2018 09:25 PM    HCT 39.4 2017 05:13 PM    HCT 41.3 2014 09:52 AM    PLATELET 082  06:33 PM    PLATELET 648  09:25 PM    PLATELET 444  05:13 PM    PLATELET 587  09:52 AM       No results found for this or any previous visit (from the past 24 hour(s)).

## 2019-11-10 NOTE — DISCHARGE SUMMARY
Obstetrical Discharge Summary     Name: Carmen Hardwick MRN: [id number]  SSN: [id number]    YOB: 1995  Age: 25 y.o. Sex: female      Admit Date: 2019    Discharge Date: 11/10/2019     Admitting Physician: Jorge Quiroz MD     Attending Physician:  Nick Groves MD     Admission Diagnoses: Term pregnancy [Z34.90]    Discharge Diagnoses:   Information for the patient's :  Letha Homme [[id number]]   Delivery of a 3.37 kg female infant via Vaginal, Spontaneous on 2019 at 3:37 PM  by Misty Sanderson. Apgars were 9  and 9 . Additional Diagnoses:   Hospital Problems  Date Reviewed: 2019          Codes Class Noted POA    Term pregnancy ICD-10-CM: Z34.90  ICD-9-CM: V22.1  2019 Unknown             Lab Results   Component Value Date/Time    Rubella, External 1.27 2019    GrBStrep, External positive 10/17/2019       Hospital Course: Normal hospital course following the delivery. Disposition at Discharge: Home or self care    Discharged Condition: Stable    Patient Instructions:   Current Discharge Medication List      START taking these medications    Details   oxyCODONE-acetaminophen (PERCOCET) 5-325 mg per tablet Take 1-2 Tabs by mouth every six (6) hours as needed for Pain for up to 3 days. Max Daily Amount: 8 Tabs. Qty: 10 Tab, Refills: 0    Associated Diagnoses: Term pregnancy      ibuprofen (MOTRIN) 800 mg tablet Take 1 Tab by mouth every eight (8) hours as needed for Pain. Qty: 30 Tab, Refills: 1         CONTINUE these medications which have NOT CHANGED    Details   PNV no.24-iron-folic acid-dha (PRENATAL DHA+COMPLETE PRENATAL) -300 mg-mcg-mg cmpk Take  by mouth. Reference my discharge instructions.     Follow-up Appointments   Procedures    FOLLOW UP VISIT Appointment in: 6 Weeks     Standing Status:   Standing     Number of Occurrences:   1     Order Specific Question:   Appointment in     Answer:   6 Weeks        Signed By:  Haylee Alexandre MD     November 10, 2019

## 2019-11-10 NOTE — ROUTINE PROCESS
1900 Bedside shift change report given to JOY Gallagher RN (oncoming nurse) by Robby Rausch. [facility] (offgoing nurse). Report included the following information SBAR, Kardex, Intake/Output and MAR.

## 2019-11-10 NOTE — DISCHARGE INSTRUCTIONS
POST DELIVERY DISCHARGE INSTRUCTIONS    Name: Femi Isbell  YOB: 1995  Primary Diagnosis: Active Problems:    Term pregnancy (2019)        General:     Diet/Diet Restrictions:  Eight 8-ounce glasses of fluid daily (water, juices); avoid excessive caffeine intake. Meals/snacks as desired which are high in fiber and carbohydrates and low in fat and cholesterol. Physical Activity / Restrictions / Safety:     Avoid heavy lifting, no more that 8 lbs. For 2-3 weeks; limit use of stairs to 2 times daily for the first week home. No driving for one week. Avoid intercourse 4-6 weeks, no douching or tampon use. Check with obstetrician before starting or resuming an exercise program.         Discharge Instructions/Special Treatment/Home Care Needs:     Continue prenatal vitamins. Continue to use squirt bottle with warm water on your episiotomy after each bathroom use until bleeding stops. If steri-strips applied to your incision, remove in 7-10 days. Call your doctor for the following:     Fever over 101 degrees by mouth. Vaginal bleeding heavier than a normal menstrual period or clot larger than a golf ball. Red streaks or increased swelling of legs, painful red streaks on your breast.  Painful urination, constipation and increased pain or swelling or discharge with your incision. If you feel extremely anxious or overwhelmed. If you have thoughts of harming yourself and/or your baby. Pain Management:     Pain Management:   Take Acetaminophen (Tylenol) or Ibuprofen (Advil, Motrin), as directed for pain. Use a warm Sitz bath 3 times daily to relieve episiotomy or hemorrhoidal discomfort. Heating pad to  incision as needed. For hemorrhoidal discomfort, use Tucks and Anusol cream as needed and directed. Follow-Up Care:      These are general instructions for a healthy lifestyle:    No smoking/ No tobacco products/ Avoid exposure to second hand smoke    Surgeon Giovanny Weeks Warning:  Quitting smoking now greatly reduces serious risk to your health. Obesity, smoking, and sedentary lifestyle greatly increases your risk for illness    A healthy diet, regular physical exercise & weight monitoring are important for maintaining a healthy lifestyle    Recognize signs and symptoms of STROKE:    F-face looks uneven    A-arms unable to move or move unevenly    S-speech slurred or non-existent    T-time-call 911 as soon as signs and symptoms begin-DO NOT go       Back to bed or wait to see if you get better-TIME IS BRAIN. Patient Education        After Your Delivery (the Postpartum Period): Care Instructions  Your Care Instructions    Congratulations on the birth of your baby. Like pregnancy, the  period can be a time of excitement, mushtaq, and exhaustion. You may look at your wondrous little baby and feel happy. You may also be overwhelmed by your new sleep hours and new responsibilities. At first, babies often sleep during the days and are awake at night. They do not have a pattern or routine. They may make sudden gasps, jerk themselves awake, or look like they have crossed eyes. These are all normal, and they may even make you smile. In these first weeks after delivery, try to take good care of yourself. It may take 4 to 6 weeks to feel like yourself again, and possibly longer if you had a  birth. You will likely feel very tired for several weeks. Your days will be full of ups and downs, but lots of mushtaq as well. Follow-up care is a key part of your treatment and safety. Be sure to make and go to all appointments, and call your doctor if you are having problems. It's also a good idea to know your test results and keep a list of the medicines you take. How can you care for yourself at home? Take care of your body after delivery  · Use pads instead of tampons for the bloody flow that may last as long as 2 weeks. · Ease cramps with ibuprofen (Advil, Motrin).   · Ease soreness of hemorrhoids and the area between your vagina and rectum with ice compresses or witch hazel pads. · Ease constipation by drinking lots of fluid and eating high-fiber foods. Ask your doctor about over-the-counter stool softeners. · Cleanse yourself with a gentle squeeze of warm water from a bottle instead of wiping with toilet paper. · Take a sitz bath in warm water several times a day. · Wear a good nursing bra. Ease sore and swollen breasts with warm, wet washcloths. · If you are not breastfeeding, use ice rather than heat for breast soreness. · Your period may not start for several months if you are breastfeeding. You may bleed more, and longer at first, than you did before you got pregnant. · Wait until you are healed (about 4 to 6 weeks) before you have sexual intercourse. Your doctor will tell you when it is okay to have sex. · Try not to travel with your baby for 5 or 6 weeks. If you take a long car trip, make frequent stops to walk around and stretch. Avoid exhaustion  · Rest every day. Try to nap when your baby naps. · Ask another adult to be with you for a few days after delivery. · Plan for  if you have other children. · Stay flexible so you can eat at odd hours and sleep when you need to. Both you and your baby are making new schedules. · Plan small trips to get out of the house. Change can make you feel less tired. · Ask for help with housework, cooking, and shopping. Remind yourself that your job is to care for your baby. Know about help for postpartum depression  · \"Baby blues\" are common for the first 1 to 2 weeks after birth. You may cry or feel sad or irritable for no reason. · Rest whenever you can. Being tired makes it harder to handle your emotions. · Go for walks with your baby. · Talk to your partner, friends, and family about your feelings.   · If your symptoms last for more than a few weeks, or if you feel very depressed, ask your doctor for help.  · Postpartum depression can be treated. Support groups and counseling can help. Sometimes medicine can also help. Stay healthy  · Eat healthy foods so you have more energy and lose extra baby pounds. · If you breastfeed, avoid drugs. If you quit smoking during pregnancy, try to stay smoke-free. If you choose to have a drink now and then, have only one drink, and limit the number of occasions that you have a drink. Wait to breastfeed at least 2 hours after you have a drink to reduce the amount of alcohol the baby may get in the milk. · Start daily exercise after 4 to 6 weeks, but rest when you feel tired. · Learn exercises to tone your belly. Do Kegel exercises to regain strength in your pelvic muscles. You can do these exercises while you stand or sit. ? Squeeze the same muscles you would use to stop your urine. Your belly and thighs should not move. ? Hold the squeeze for 3 seconds, and then relax for 3 seconds. ? Start with 3 seconds. Then add 1 second each week until you are able to squeeze for 10 seconds. ? Repeat the exercise 10 to 15 times for each session. Do three or more sessions each day. · Find a class for new mothers and new babies that has an exercise time. · If you had a  birth, give yourself a bit more time before you exercise, and be careful. When should you call for help? Call 911 anytime you think you may need emergency care. For example, call if:    · You have thoughts of harming yourself, your baby, or another person.     · You passed out (lost consciousness).     · You have chest pain, are short of breath, or cough up blood.     · You have a seizure.    Call your doctor now or seek immediate medical care if:    · You have severe vaginal bleeding. This means you are passing blood clots and soaking through a pad each hour for 2 or more hours.     · You are dizzy or lightheaded, or you feel like you may faint.     · You have a fever.     · You have new or more belly pain.   · You have signs of a blood clot in your leg (called a deep vein thrombosis), such as:  ? Pain in the calf, back of the knee, thigh, or groin. ? Redness and swelling in your leg or groin.     · You have signs of preeclampsia, such as:  ? Sudden swelling of your face, hands, or feet. ? New vision problems (such as dimness, blurring, or seeing spots). ? A severe headache.    Watch closely for changes in your health, and be sure to contact your doctor if:    · Your vaginal bleeding seems to be getting heavier.     · You have new or worse vaginal discharge.     · You feel sad, anxious, or hopeless for more than a few days.     · You do not get better as expected. Where can you learn more? Go to http://leora-heather.info/. Enter A461 in the search box to learn more about \"After Your Delivery (the Postpartum Period): Care Instructions. \"  Current as of: May 29, 2019  Content Version: 12.2  © 1783-9720 [employer]. Care instructions adapted under license by [employer] (which disclaims liability or warranty for this information). If you have questions about a medical condition or this instruction, always ask your healthcare professional. [address] any warranty or liability for your use of this information.

## 2021-08-23 NOTE — PERIOP NOTES
[facility]  Ambulatory Surgery Unit  Pre-operative Instructions for Endo Procedures    Procedure Date  8/25            Tentative Arrival Time tbd      1. On the day of your procedure, please report to the Ambulatory Surgery Unit Registration Desk and sign in at your designated time. The Ambulatory Surgery Unit is located in [facility] on the [facility] side of the [facility] across from the [address] [place] [address]. Please have all of your health insurance cards and a photo ID. 2. You must have someone with you to drive you home, as you should not drive a car for 24 hours following anesthesia. Please make arrangements for a responsible adult friend or family member to stay with you for at least the first 24 hours after your procedure. 3. Do not have anything to eat or drink (including water, gum, mints, coffee, juice) after 11:59 PM 8/24. This may not apply to medications prescribed by your physician. (Please note below the special instructions with medications to take the morning of your procedure.)    4. If applicable, follow the clear liquid diet and bowel prep instructions provided by your physician's office. If you do not have this information, or have any questions, please contact your physician's office. 5. We recommend you do not drink any alcoholic beverages for 24 hours before and after your procedure. 6. Contact your surgeons office for instructions on the following medications: non-steroidal anti-inflammatory drugs (i.e. Advil, Aleve), vitamins, and supplements. (Some surgeons will want you to stop these medications prior to surgery and others may allow you to take them)   **If you are currently taking Plavix, Coumadin, Aspirin and/or other blood-thinning agents, contact your surgeon for instructions. ** Your surgeon will partner with the physician prescribing these medications to determine if it is safe to stop or if you need to continue taking.  Please do not stop taking these medications without instructions from your surgeon. 7. In an effort to help prevent surgical site infection, we ask that you shower with an anti-bacterial soap (i.e. Dial or Safeguard) on the morning of your procedure. Do not apply any lotions, powders, or deodorants after showering. 8. Wear comfortable clothes. Wear glasses instead of contacts. Do not bring any jewelry or money (other than copays or fees as instructed). Do not wear make-up, particularly mascara, the morning of your procedure. Wear your hair loose or down, no ponytails, buns, ravinder pins or clips. All body piercings must be removed. 9. You should understand that if you do not follow these instructions your procedure may be cancelled. If your physical condition changes (i.e. fever, cold or flu) please contact your surgeon as soon as possible. 10. It is important that you be on time. If a situation occurs where you may be late, or if you have any questions or problems, please call [phone]. 11. Your procedure time may be subject to change. You will receive a phone call the day prior to confirm your arrival time. Special Instructions: Take all medications and inhalers, as prescribed, on the morning of surgery with a sip of water EXCEPT: none      I understand a pre-operative phone call will be made to verify my procedure time. In the event that I am not available, I give permission for a message to be left on my answering service and/or with another person?       yes    Reviewed by phone with pt, verbalized understanding.   ___________________      ___________________      ___________________  (Signature of Patient)          (Witness)                   (Date and Time)

## 2021-08-24 ENCOUNTER — ANESTHESIA EVENT (OUTPATIENT)
Dept: SURGERY | Age: 26
End: 2021-08-24
Payer: COMMERCIAL

## 2021-08-25 ENCOUNTER — ANESTHESIA (OUTPATIENT)
Dept: SURGERY | Age: 26
End: 2021-08-25
Payer: COMMERCIAL

## 2021-08-25 ENCOUNTER — HOSPITAL ENCOUNTER (OUTPATIENT)
Age: 26
Setting detail: OUTPATIENT SURGERY
Discharge: HOME | End: 2021-08-25
Attending: OBSTETRICS & GYNECOLOGY | Admitting: OBSTETRICS & GYNECOLOGY
Payer: COMMERCIAL

## 2021-08-25 VITALS
HEART RATE: 79 BPM | BODY MASS INDEX: 25.03 KG/M2 | HEIGHT: 62 IN | DIASTOLIC BLOOD PRESSURE: 65 MMHG | SYSTOLIC BLOOD PRESSURE: 108 MMHG | OXYGEN SATURATION: 100 % | TEMPERATURE: 98 F | RESPIRATION RATE: 16 BRPM | WEIGHT: 136 LBS

## 2021-08-25 PROCEDURE — 77030018836 HC SOL IRR NACL ICUM -A: Performed by: OBSTETRICS & GYNECOLOGY

## 2021-08-25 PROCEDURE — 77030009368: Performed by: OBSTETRICS & GYNECOLOGY

## 2021-08-25 PROCEDURE — 74011250636 HC RX REV CODE- 250/636: Performed by: REGISTERED NURSE

## 2021-08-25 PROCEDURE — 77030030437 HC FLTR UTER VAC OCOA -A: Performed by: OBSTETRICS & GYNECOLOGY

## 2021-08-25 PROCEDURE — 88305 TISSUE EXAM BY PATHOLOGIST: CPT

## 2021-08-25 PROCEDURE — 77030008578 HC TBNG UTER SUC BUSS -A: Performed by: OBSTETRICS & GYNECOLOGY

## 2021-08-25 PROCEDURE — 76030000000 HC AMB SURG OR TIME 0.5 TO 1: Performed by: OBSTETRICS & GYNECOLOGY

## 2021-08-25 PROCEDURE — 74011250636 HC RX REV CODE- 250/636: Performed by: ANESTHESIOLOGY

## 2021-08-25 PROCEDURE — 74011000250 HC RX REV CODE- 250: Performed by: OBSTETRICS & GYNECOLOGY

## 2021-08-25 PROCEDURE — 74011000250 HC RX REV CODE- 250: Performed by: REGISTERED NURSE

## 2021-08-25 PROCEDURE — 2709999900 HC NON-CHARGEABLE SUPPLY: Performed by: OBSTETRICS & GYNECOLOGY

## 2021-08-25 PROCEDURE — 76210000034 HC AMBSU PH I REC 0.5 TO 1 HR: Performed by: OBSTETRICS & GYNECOLOGY

## 2021-08-25 PROCEDURE — 76060000061 HC AMB SURG ANES 0.5 TO 1 HR: Performed by: OBSTETRICS & GYNECOLOGY

## 2021-08-25 PROCEDURE — 76210000046 HC AMBSU PH II REC FIRST 0.5 HR: Performed by: OBSTETRICS & GYNECOLOGY

## 2021-08-25 PROCEDURE — 74011250637 HC RX REV CODE- 250/637

## 2021-08-25 RX ORDER — SODIUM CHLORIDE 0.9 % (FLUSH) 0.9 %
5-40 SYRINGE (ML) INJECTION EVERY 8 HOURS
Status: DISCONTINUED | OUTPATIENT
Start: 2021-08-25 | End: 2021-08-25 | Stop reason: HOSPADM

## 2021-08-25 RX ORDER — FENTANYL CITRATE 50 UG/ML
25 INJECTION, SOLUTION INTRAMUSCULAR; INTRAVENOUS
Status: DISCONTINUED | OUTPATIENT
Start: 2021-08-25 | End: 2021-08-25 | Stop reason: HOSPADM

## 2021-08-25 RX ORDER — MIDAZOLAM HYDROCHLORIDE 1 MG/ML
INJECTION, SOLUTION INTRAMUSCULAR; INTRAVENOUS AS NEEDED
Status: DISCONTINUED | OUTPATIENT
Start: 2021-08-25 | End: 2021-08-25 | Stop reason: HOSPADM

## 2021-08-25 RX ORDER — MORPHINE SULFATE 10 MG/ML
2 INJECTION, SOLUTION INTRAMUSCULAR; INTRAVENOUS
Status: DISCONTINUED | OUTPATIENT
Start: 2021-08-25 | End: 2021-08-25 | Stop reason: HOSPADM

## 2021-08-25 RX ORDER — DOXYCYCLINE HYCLATE 100 MG
200 TABLET ORAL EVERY 12 HOURS
Status: DISCONTINUED | OUTPATIENT
Start: 2021-08-25 | End: 2021-08-25 | Stop reason: HOSPADM

## 2021-08-25 RX ORDER — FENTANYL CITRATE 50 UG/ML
INJECTION, SOLUTION INTRAMUSCULAR; INTRAVENOUS AS NEEDED
Status: DISCONTINUED | OUTPATIENT
Start: 2021-08-25 | End: 2021-08-25 | Stop reason: HOSPADM

## 2021-08-25 RX ORDER — PROPOFOL 10 MG/ML
INJECTION, EMULSION INTRAVENOUS AS NEEDED
Status: DISCONTINUED | OUTPATIENT
Start: 2021-08-25 | End: 2021-08-25 | Stop reason: HOSPADM

## 2021-08-25 RX ORDER — SODIUM CHLORIDE, SODIUM LACTATE, POTASSIUM CHLORIDE, CALCIUM CHLORIDE 600; 310; 30; 20 MG/100ML; MG/100ML; MG/100ML; MG/100ML
25 INJECTION, SOLUTION INTRAVENOUS CONTINUOUS
Status: DISCONTINUED | OUTPATIENT
Start: 2021-08-25 | End: 2021-08-25 | Stop reason: HOSPADM

## 2021-08-25 RX ORDER — SODIUM CHLORIDE 0.9 % (FLUSH) 0.9 %
5-40 SYRINGE (ML) INJECTION AS NEEDED
Status: DISCONTINUED | OUTPATIENT
Start: 2021-08-25 | End: 2021-08-25 | Stop reason: HOSPADM

## 2021-08-25 RX ORDER — HYDROMORPHONE HYDROCHLORIDE 1 MG/ML
.2-.5 INJECTION, SOLUTION INTRAMUSCULAR; INTRAVENOUS; SUBCUTANEOUS ONCE
Status: DISCONTINUED | OUTPATIENT
Start: 2021-08-25 | End: 2021-08-25 | Stop reason: HOSPADM

## 2021-08-25 RX ORDER — ONDANSETRON 2 MG/ML
4 INJECTION INTRAMUSCULAR; INTRAVENOUS AS NEEDED
Status: DISCONTINUED | OUTPATIENT
Start: 2021-08-25 | End: 2021-08-25 | Stop reason: HOSPADM

## 2021-08-25 RX ORDER — KETOROLAC TROMETHAMINE 30 MG/ML
INJECTION, SOLUTION INTRAMUSCULAR; INTRAVENOUS AS NEEDED
Status: DISCONTINUED | OUTPATIENT
Start: 2021-08-25 | End: 2021-08-25 | Stop reason: HOSPADM

## 2021-08-25 RX ORDER — LIDOCAINE HYDROCHLORIDE 10 MG/ML
INJECTION, SOLUTION EPIDURAL; INFILTRATION; INTRACAUDAL; PERINEURAL AS NEEDED
Status: DISCONTINUED | OUTPATIENT
Start: 2021-08-25 | End: 2021-08-25 | Stop reason: HOSPADM

## 2021-08-25 RX ORDER — DEXAMETHASONE SODIUM PHOSPHATE 4 MG/ML
INJECTION, SOLUTION INTRA-ARTICULAR; INTRALESIONAL; INTRAMUSCULAR; INTRAVENOUS; SOFT TISSUE AS NEEDED
Status: DISCONTINUED | OUTPATIENT
Start: 2021-08-25 | End: 2021-08-25 | Stop reason: HOSPADM

## 2021-08-25 RX ORDER — LIDOCAINE HYDROCHLORIDE 10 MG/ML
0.1 INJECTION, SOLUTION EPIDURAL; INFILTRATION; INTRACAUDAL; PERINEURAL AS NEEDED
Status: DISCONTINUED | OUTPATIENT
Start: 2021-08-25 | End: 2021-08-25 | Stop reason: HOSPADM

## 2021-08-25 RX ORDER — DOXYCYCLINE HYCLATE 100 MG
TABLET ORAL
Status: COMPLETED
Start: 2021-08-25 | End: 2021-08-25

## 2021-08-25 RX ORDER — ONDANSETRON 2 MG/ML
INJECTION INTRAMUSCULAR; INTRAVENOUS AS NEEDED
Status: DISCONTINUED | OUTPATIENT
Start: 2021-08-25 | End: 2021-08-25

## 2021-08-25 RX ORDER — OXYCODONE AND ACETAMINOPHEN 5; 325 MG/1; MG/1
1 TABLET ORAL
Status: DISCONTINUED | OUTPATIENT
Start: 2021-08-25 | End: 2021-08-25 | Stop reason: HOSPADM

## 2021-08-25 RX ORDER — SILVER NITRATE 38.21; 12.74 MG/1; MG/1
STICK TOPICAL AS NEEDED
Status: DISCONTINUED | OUTPATIENT
Start: 2021-08-25 | End: 2021-08-25 | Stop reason: HOSPADM

## 2021-08-25 RX ORDER — LIDOCAINE HYDROCHLORIDE 20 MG/ML
INJECTION, SOLUTION EPIDURAL; INFILTRATION; INTRACAUDAL; PERINEURAL AS NEEDED
Status: DISCONTINUED | OUTPATIENT
Start: 2021-08-25 | End: 2021-08-25 | Stop reason: HOSPADM

## 2021-08-25 RX ORDER — IBUPROFEN 800 MG/1
800 TABLET ORAL
Qty: 30 TABLET | Refills: 0 | Status: SHIPPED | OUTPATIENT
Start: 2021-08-25 | End: 2022-05-02

## 2021-08-25 RX ORDER — DIPHENHYDRAMINE HYDROCHLORIDE 50 MG/ML
12.5 INJECTION, SOLUTION INTRAMUSCULAR; INTRAVENOUS AS NEEDED
Status: DISCONTINUED | OUTPATIENT
Start: 2021-08-25 | End: 2021-08-25 | Stop reason: HOSPADM

## 2021-08-25 RX ORDER — MECLIZINE HYDROCHLORIDE 25 MG/1
25 TABLET ORAL ONCE
Status: COMPLETED | OUTPATIENT
Start: 2021-08-25 | End: 2021-08-25

## 2021-08-25 RX ADMIN — ONDANSETRON HYDROCHLORIDE 4 MG: 2 INJECTION, SOLUTION INTRAMUSCULAR; INTRAVENOUS at 10:18

## 2021-08-25 RX ADMIN — PROPOFOL 100 MCG/KG/MIN: 10 INJECTION, EMULSION INTRAVENOUS at 10:17

## 2021-08-25 RX ADMIN — DEXAMETHASONE SODIUM PHOSPHATE 4 MG: 4 INJECTION, SOLUTION INTRAMUSCULAR; INTRAVENOUS at 10:18

## 2021-08-25 RX ADMIN — PROPOFOL 80 MG: 10 INJECTION, EMULSION INTRAVENOUS at 10:24

## 2021-08-25 RX ADMIN — Medication 200 MG: at 09:53

## 2021-08-25 RX ADMIN — PROPOFOL 130 MG: 10 INJECTION, EMULSION INTRAVENOUS at 10:16

## 2021-08-25 RX ADMIN — LIDOCAINE HYDROCHLORIDE 40 MG: 20 INJECTION, SOLUTION EPIDURAL; INFILTRATION; INTRACAUDAL; PERINEURAL at 10:16

## 2021-08-25 RX ADMIN — KETOROLAC TROMETHAMINE 30 MG: 30 INJECTION, SOLUTION INTRAMUSCULAR; INTRAVENOUS at 10:18

## 2021-08-25 RX ADMIN — MECLIZINE HYDROCHLORIDE 25 MG: 25 TABLET ORAL at 09:12

## 2021-08-25 RX ADMIN — FENTANYL CITRATE 50 MCG: 50 INJECTION, SOLUTION INTRAMUSCULAR; INTRAVENOUS at 10:25

## 2021-08-25 RX ADMIN — SODIUM CHLORIDE, POTASSIUM CHLORIDE, SODIUM LACTATE AND CALCIUM CHLORIDE 25 ML/HR: 600; 310; 30; 20 INJECTION, SOLUTION INTRAVENOUS at 09:12

## 2021-08-25 RX ADMIN — MIDAZOLAM HYDROCHLORIDE 2 MG: 1 INJECTION, SOLUTION INTRAMUSCULAR; INTRAVENOUS at 10:08

## 2021-08-25 RX ADMIN — DOXYCYCLINE HYCLATE 200 MG: 100 TABLET, COATED ORAL at 09:53

## 2021-08-25 NOTE — ANESTHESIA PREPROCEDURE EVALUATION
Relevant Problems   No relevant active problems       Anesthetic History     PONV          Review of Systems / Medical History  Patient summary reviewed, nursing notes reviewed and pertinent labs reviewed    Pulmonary  Within defined limits                 Neuro/Psych         Psychiatric history (migraines)     Cardiovascular                Pertinent negatives: No hypertension  Exercise tolerance: >4 METS  Comments: H/o gestational HTN   GI/Hepatic/Renal  Within defined limits              Endo/Other  Within defined limits           Other Findings   Comments: Missed AB    Motion sickness         Physical Exam    Airway  Mallampati: I  TM Distance: 4 - 6 cm  Neck ROM: normal range of motion   Mouth opening: Normal     Cardiovascular    Rhythm: regular  Rate: normal         Dental  No notable dental hx       Pulmonary  Breath sounds clear to auscultation               Abdominal  GI exam deferred       Other Findings            Anesthetic Plan    ASA: 1            Induction: Intravenous  Anesthetic plan and risks discussed with: Patient      PONV prophylaxis.  Meclizine po preop

## 2021-08-25 NOTE — PERIOP NOTES
Dr. Bishop Floor stated patient does not need to pump and dump since child 3years old that is breast feeding. 1114 Pt. Alert. Denies pain or chill. Discharge instructions reviewed with caregiver and patient. Allowed and answered questions. Tolerating PO fluids. Both state ready for discharge.      1135 Discharged to car without incident with own glasses and did void before discharge

## 2021-08-25 NOTE — DISCHARGE INSTRUCTIONS
After Care Instructions For Your D&C      1. You may resume your usual diet once the nausea resolves. Initially, try sips of warm fluids and a bland diet. 2. Avoid heavy lifting and straining. Gradually increase your activity. First, try walking and doing light activity around the house. Resume your normal habits if no significant discomfort or bleeding develops. Most women can return to work within one to four days after this procedure. 3. You may take showers. Avoid using a tub bath, swimming pool or hot tub until after your check-up. 4. Do not place anything in your vagina until after your postoperative visit. Do not   douche, use tampons, or have intercourse because this may cause bleeding and   infection. 5. You may initially experience a heavy bloody discharge. This should not be more than your menstrual flow. Over the next several days, the flow should steadily decrease. 6. Typically following the procedure, there is little or no pain. You may feel cramps in your lower abdomen. Tylenol may relieve mild cramping. If pain medication does not improve your symptoms, you should contact your physician. 7. Contact the office if you have excessive bleeding (saturating a pad an hour for two hours or passing large clots). It is also necessary to speak with your physician if you develop chills, a temperature greater than 100.4, difficulty voiding or burning on urination. 8. Your physician may want to see you in the office after your D&C. Please call for an appointment if this has not already been arranged. Our office phone number is [phone]. If appropriate, the microscopic results from your procedure will be discussed at this follow-up visit.        >>>You received Toradol during your surgery. You may not take any form of NSAIDS (non steroidal anti inflammatory drugs) such as Advil, Ibuprofen, Aleve, Motrin until 4:15pm.      DO NOT DRIVE WHILE TAKING NARCOTIC PAIN MEDICATIONS.     DO NOT TAKE SLEEPING MEDICATIONS OR ANTIANXIETY MEDICATION ESPECIALLY THE NIGHT OF ANESTHESIA! CPAP PATIENTS BE SURE TO WEAR MACHINE WHENEVER NAPPING OR SLEEPING! DISCHARGE SUMMARY from Nurse    The following personal items collected during your admission are returned to you:   Dental Appliance: Dental Appliances: None  Vision: Visual Aid: Contacts, Glasses  Hearing Aid:    Suha Evans: Suha Evans: Body Piercing  Clothing: Clothing: With patient  Other Valuables: Other Valuables: None  Valuables sent to safe:        PATIENT INSTRUCTIONS:    After General Anesthesia or Intravenous Sedation, for 24 hours or while taking prescription Narcotics:        Someone should be with you for the next 24 hours. For your own safety, a responsible adult must drive you home. · Limit your activities  · Recommended activity: Rest today, up with assistance today. Do not climb stairs or shower unattended for the next 24 hours. · Please start with a soft bland diet and advance as tolerated (no nausea) to regular diet. · If you have a sore throat you should try the following: fluids, warm salt water gargles, or throat lozenges. If it does not improve after several days please follow up with your primary physician. · Do not drive and operate hazardous machinery  · Do not make important personal or business decisions  · Do  not drink alcoholic beverages  · If you have not urinated within 8 hours after discharge, please contact your surgeon on call.     Report the following to your surgeon:  · Excessive pain, swelling, redness or odor of or around the surgical area  · Temperature over 100.5  · Nausea and vomiting lasting longer than 4 hours or if unable to take medications  · Any signs of decreased circulation or nerve impairment to extremity: change in color, persistent  numbness, tingling, coldness or increase pain      · You will receive a Post Operative Call from one of the Recovery Room Nurses on the day after your surgery to check on you. It is very important for us to know how you are recovering after your surgery. If you have an issue or need to speak with someone, please call your surgeon, do not wait for the post operative call. · You may receive an e-mail or letter in the mail from [employer] regarding your experience with us in the Ambulatory Surgery Unit. Your feedback is valuable to us and we appreciate your participation in the survey. · If the above instructions are not adequate or you are having problems after your surgery, call the physician at their office number. · We wish you a speedy recovery ? What to do at Home:      *  Please give a list of your current medications to your Primary Care Provider. *  Please update this list whenever your medications are discontinued, doses are      changed, or new medications (including over-the-counter products) are added. *  Please carry medication information at all times in case of emergency situations. If you have not received your influenza and/or pneumococcal vaccine, please follow up with your primary care physician. The discharge information has been reviewed with the patient and caregiver. The patient and caregiver verbalized understanding.

## 2021-08-25 NOTE — PERIOP NOTES
Air Warming blanket placed on pt; turned on for comfort    Permission received to review discharge instructions and discuss private health information with  and will be with patient after discharge

## 2021-08-25 NOTE — OP NOTES
SUCTION CURETTAGE FULL OP NOTE    Komal Abbott  [id number]  1995      DATE OF PROCEDURE:  8/25/2021    PREOPERATIVE DIAGNOSIS:  MISSED MISCARRIAGE    POSTOPERATIVE DIAGNOSIS:  MISSED MISCARRIAGE    PROCEDURE: Procedure(s):  DILATATION AND CURETTAGE WITH SUCTION (ANESTHESIA CHOICE) (URGENT)     SURGEON:  Giles Romero MD    ASSISTANT: None    ANESTHESIA:monitored anesthesia care    EBL: Minimal    SPECIMENS: Products of conception    FINDINGS: Normal cervix, uterus expected size, good cri at end of procedure     PROCEDURE: Patient was placed on the operating table in the supine position and after induction of anesthesia she was placed in the dorsal lithotomy position and prepped and draped in the usual fashion for vaginal surgery. Cervix was exposed with a weighted vaginal speculum and grasped with a single-tooth tenaculum. The cervix was grasped with a single tooth tenaculum and approximately 10cc of 1% plan lidocaine was injected to achieve a paracervical block. The cervix was then dilated to 7mm. A curved 7 suction curette device was then introduced into the endometrial cavity. Thorough suction curettage followed by sharp curettage with a large curette followed again by suction curettage was performed until the suction returned no further clot or products of conception. Adequate curettage was felt to be obtained. . Hemostasis appeared normal, Instruments were removed. The patient went to the recovery room in satisfactory condition. All counts were correct times two. Of note blood type was RH pos.

## 2021-08-25 NOTE — H&P
Gynecology History and Physical    Name: Jama Davenport MRN: [id number] SSN: [id number]    YOB: 1995  Age: 22 y.o. Sex: female       Subjective:      Chief complaint:  Missed miscarriage    [address] Obdulia Reynolds is a 22 y.o. Renu Tidwell presents for D&C after missed ab. 9wks by dates, 5 weeks by US (yolk sac and no fetal pole developed). She is admitted for Procedure(s) (LRB):  DILATATION AND CURETTAGE WITH SUCTION (ANESTHESIA CHOICE) (URGENT) (N/A). Rh pos. OB History        3    Para   2    Term   2            AB        Living   2       SAB        TAB        Ectopic        Molar        Multiple   0    Live Births   2              Past Medical History:   Diagnosis Date    Adverse effect of anesthesia     MIGRAINE    Breast disorder     6 yrs ago lumpectomy for fibroadnoma    Essential hypertension     Gestational HTN    Nausea & vomiting      Past Surgical History:   Procedure Laterality Date    HX CYST INCISION AND DRAINAGE      HX WISDOM TEETH EXTRACTION      SD BREAST SURGERY PROCEDURE UNLISTED Right     lumpectomy for fibroadnoma6 years ago     Social History     Occupational History    Not on file   Tobacco Use    Smoking status: Never Smoker    Smokeless tobacco: Never Used   Substance and Sexual Activity    Alcohol use: No    Drug use: No    Sexual activity: Yes     Family History   Problem Relation Age of Onset    Hypertension Maternal Grandmother     Diabetes Maternal Grandmother         No Known Allergies  Prior to Admission medications    Medication Sig Start Date End Date Taking? Authorizing Provider   PNV no.24-iron-folic acid-dha (PRENATAL DHA+COMPLETE PRENATAL) [id number] mg-mcg-mg cmpk Take  by mouth. Yes Provider, Historical        Review of Systems:  A comprehensive review of systems was negative except for that written in the History of Present Illness.      Objective:     Vitals:    21 1347 21 0903   BP:  116/83   Pulse:  (!) 101   Resp:  16   Temp: 98.5 °F (36.9 °C)   SpO2:  100%   Weight: 61.7 kg (136 lb) 61.7 kg (136 lb)   Height: 5' 2\" (1.575 m) 5' 2\" (1.575 m)       Physical Exam:  Patient without distress. Abdomen: soft, nontender    Assessment:     Active Problems:    * No active hospital problems. *     Missed ab    Plan:     Procedure(s) (LRB):  DILATATION AND CURETTAGE WITH SUCTION (ANESTHESIA CHOICE) (URGENT) (N/A)  Discussed the risks of surgery including the risks of bleeding, infection, deep vein thrombosis, and surgical injuries to internal organs including but not limited to the bowels, bladder, rectum, and female reproductive organs. The patient understands the risks; any and all questions were answered to the patient's satisfaction.

## 2021-08-25 NOTE — PERIOP NOTES
Nia [facility]  1995  [id number]    Situation:  Verbal report given from: RN and CRNA  Procedure: Procedure(s):  DILATATION AND CURETTAGE WITH SUCTION (ANESTHESIA CHOICE) (URGENT)    Background:    Preoperative diagnosis: MISSED MISCARRIAGE    Postoperative diagnosis: MISSED MISCARRIAGE    :  Dr. Alfredo Tejada    Assistant(s): Circ-1: Noe De León RN  Circ-2: Azul Shine RN  Scrub Tech-1: Geraldine Mayorga    Specimens:   ID Type Source Tests Collected by Time Destination   1 : Products of conception Fresh Uterus  Irina Preston MD 8/25/2021 1024 Pathology       Assessment:  Intra-procedure medications         Anesthesia gave intra-procedure sedation and medications, see anesthesia flow sheet     Intravenous fluids: LR@ KVO     Vital signs stable. Pt denies pain and chill.        Recommendation:    Permission to share finding with  : yes

## 2021-11-23 LAB
ANTIBODY SCREEN, EXTERNAL: NEGATIVE
CHLAMYDIA, EXTERNAL: NEGATIVE
HBSAG, EXTERNAL: NEGATIVE
HEPATITIS C AB,   EXT: NORMAL
HIV, EXTERNAL: NORMAL
N. GONORRHEA, EXTERNAL: NEGATIVE
RUBELLA, EXTERNAL: NORMAL
T. PALLIDUM, EXTERNAL: NORMAL

## 2022-03-19 PROBLEM — Z34.90 TERM PREGNANCY: Status: ACTIVE | Noted: 2019-11-07

## 2022-03-19 PROBLEM — O42.90 PROM (PREMATURE RUPTURE OF MEMBRANES): Status: ACTIVE | Noted: 2017-03-01

## 2022-04-17 ENCOUNTER — HOSPITAL ENCOUNTER (INPATIENT)
Age: 27
LOS: 15 days | Discharge: HOME | DRG: 832 | End: 2022-05-02
Attending: OBSTETRICS & GYNECOLOGY | Admitting: OBSTETRICS & GYNECOLOGY
Payer: COMMERCIAL

## 2022-04-17 PROBLEM — O44.03 COMPLETE PLACENTA PREVIA NOS OR WITHOUT HEMORRHAGE, THIRD TRIMESTER: Status: ACTIVE | Noted: 2022-04-17

## 2022-04-17 LAB
ABO + RH BLD: NORMAL
ALBUMIN SERPL-MCNC: 3 G/DL (ref 3.5–5)
ALBUMIN/GLOB SERPL: 0.8 {RATIO} (ref 1.1–2.2)
ALP SERPL-CCNC: 69 U/L (ref 45–117)
ALT SERPL-CCNC: 20 U/L (ref 12–78)
AMPHET UR QL SCN: NEGATIVE
ANION GAP SERPL CALC-SCNC: 10 MMOL/L (ref 5–15)
APPEARANCE UR: ABNORMAL
APTT PPP: 25 SEC (ref 22.1–31)
AST SERPL-CCNC: 11 U/L (ref 15–37)
BACTERIA URNS QL MICRO: ABNORMAL /HPF
BARBITURATES UR QL SCN: NEGATIVE
BENZODIAZ UR QL: NEGATIVE
BILIRUB SERPL-MCNC: 0.2 MG/DL (ref 0.2–1)
BILIRUB UR QL: NEGATIVE
BLOOD GROUP ANTIBODIES SERPL: NORMAL
BUN SERPL-MCNC: 7 MG/DL (ref 6–20)
BUN/CREAT SERPL: 14 (ref 12–20)
CALCIUM SERPL-MCNC: 9.2 MG/DL (ref 8.5–10.1)
CANNABINOIDS UR QL SCN: NEGATIVE
CHLORIDE SERPL-SCNC: 110 MMOL/L (ref 97–108)
CO2 SERPL-SCNC: 20 MMOL/L (ref 21–32)
COCAINE UR QL SCN: NEGATIVE
COLOR UR: ABNORMAL
COMMENT, HOLDF: NORMAL
CREAT SERPL-MCNC: 0.5 MG/DL (ref 0.55–1.02)
CREAT UR-MCNC: <13 MG/DL
DRUG SCRN COMMENT,DRGCM: NORMAL
EPITH CASTS URNS QL MICRO: ABNORMAL /LPF
ERYTHROCYTE [DISTWIDTH] IN BLOOD BY AUTOMATED COUNT: 13.8 % (ref 11.5–14.5)
FIBRINOGEN PPP-MCNC: 420 MG/DL (ref 200–475)
GLOBULIN SER CALC-MCNC: 3.6 G/DL (ref 2–4)
GLUCOSE SERPL-MCNC: 116 MG/DL (ref 65–100)
GLUCOSE UR STRIP.AUTO-MCNC: NEGATIVE MG/DL
HCT VFR BLD AUTO: 35.6 % (ref 35–47)
HGB BLD-MCNC: 12.2 G/DL (ref 11.5–16)
HGB UR QL STRIP: ABNORMAL
INR PPP: 1 (ref 0.9–1.1)
KETONES UR QL STRIP.AUTO: NEGATIVE MG/DL
LEUKOCYTE ESTERASE UR QL STRIP.AUTO: ABNORMAL
MCH RBC QN AUTO: 31.2 PG (ref 26–34)
MCHC RBC AUTO-ENTMCNC: 34.3 G/DL (ref 30–36.5)
MCV RBC AUTO: 91 FL (ref 80–99)
METHADONE UR QL: NEGATIVE
NITRITE UR QL STRIP.AUTO: NEGATIVE
NRBC # BLD: 0 K/UL (ref 0–0.01)
NRBC BLD-RTO: 0 PER 100 WBC
OPIATES UR QL: NEGATIVE
PCP UR QL: NEGATIVE
PH UR STRIP: 7 [PH] (ref 5–8)
PLATELET # BLD AUTO: 158 K/UL (ref 150–400)
PMV BLD AUTO: 10.3 FL (ref 8.9–12.9)
POTASSIUM SERPL-SCNC: 3.5 MMOL/L (ref 3.5–5.1)
PROT SERPL-MCNC: 6.6 G/DL (ref 6.4–8.2)
PROT UR STRIP-MCNC: NEGATIVE MG/DL
PROT UR-MCNC: 8 MG/DL (ref 0–11.9)
PROT/CREAT UR-RTO: NORMAL
PROTHROMBIN TIME: 10.1 SEC (ref 9–11.1)
RBC # BLD AUTO: 3.91 M/UL (ref 3.8–5.2)
RBC #/AREA URNS HPF: ABNORMAL /HPF (ref 0–5)
SAMPLES BEING HELD,HOLD: NORMAL
SODIUM SERPL-SCNC: 140 MMOL/L (ref 136–145)
SP GR UR REFRACTOMETRY: <1.005 (ref 1–1.03)
SPECIMEN EXP DATE BLD: NORMAL
THERAPEUTIC RANGE,PTTT: NORMAL SECS (ref 58–77)
UA: UC IF INDICATED,UAUC: ABNORMAL
UROBILINOGEN UR QL STRIP.AUTO: 0.2 EU/DL (ref 0.2–1)
WBC # BLD AUTO: 10.2 K/UL (ref 3.6–11)
WBC URNS QL MICRO: ABNORMAL /HPF (ref 0–4)

## 2022-04-17 PROCEDURE — 86900 BLOOD TYPING SEROLOGIC ABO: CPT

## 2022-04-17 PROCEDURE — 80307 DRUG TEST PRSMV CHEM ANLYZR: CPT

## 2022-04-17 PROCEDURE — 80053 COMPREHEN METABOLIC PANEL: CPT

## 2022-04-17 PROCEDURE — 85384 FIBRINOGEN ACTIVITY: CPT

## 2022-04-17 PROCEDURE — 85027 COMPLETE CBC AUTOMATED: CPT

## 2022-04-17 PROCEDURE — 36415 COLL VENOUS BLD VENIPUNCTURE: CPT

## 2022-04-17 PROCEDURE — 4A1HXCZ MONITORING OF PRODUCTS OF CONCEPTION, CARDIAC RATE, EXTERNAL APPROACH: ICD-10-PCS | Performed by: OBSTETRICS & GYNECOLOGY

## 2022-04-17 PROCEDURE — 84156 ASSAY OF PROTEIN URINE: CPT

## 2022-04-17 PROCEDURE — 74011250636 HC RX REV CODE- 250/636: Performed by: OBSTETRICS & GYNECOLOGY

## 2022-04-17 PROCEDURE — 85730 THROMBOPLASTIN TIME PARTIAL: CPT

## 2022-04-17 PROCEDURE — 74011000250 HC RX REV CODE- 250: Performed by: OBSTETRICS & GYNECOLOGY

## 2022-04-17 PROCEDURE — 65410000002 HC RM PRIVATE OB

## 2022-04-17 PROCEDURE — 85610 PROTHROMBIN TIME: CPT

## 2022-04-17 PROCEDURE — 81001 URINALYSIS AUTO W/SCOPE: CPT

## 2022-04-17 PROCEDURE — 74011000258 HC RX REV CODE- 258: Performed by: OBSTETRICS & GYNECOLOGY

## 2022-04-17 PROCEDURE — 74011250637 HC RX REV CODE- 250/637: Performed by: OBSTETRICS & GYNECOLOGY

## 2022-04-17 RX ORDER — SODIUM CHLORIDE 0.9 % (FLUSH) 0.9 %
5-40 SYRINGE (ML) INJECTION AS NEEDED
Status: DISCONTINUED | OUTPATIENT
Start: 2022-04-17 | End: 2022-05-02 | Stop reason: HOSPADM

## 2022-04-17 RX ORDER — MAGNESIUM SULFATE HEPTAHYDRATE 40 MG/ML
4 INJECTION, SOLUTION INTRAVENOUS ONCE
Status: COMPLETED | OUTPATIENT
Start: 2022-04-17 | End: 2022-04-17

## 2022-04-17 RX ORDER — ASPIRIN 81 MG/1
TABLET ORAL DAILY
COMMUNITY

## 2022-04-17 RX ORDER — CALCIUM GLUCONATE 20 MG/ML
1 INJECTION, SOLUTION INTRAVENOUS ONCE
Status: DISPENSED | OUTPATIENT
Start: 2022-04-17 | End: 2022-04-18

## 2022-04-17 RX ORDER — ZINC GLUCONATE 10 MG
LOZENGE ORAL
COMMUNITY

## 2022-04-17 RX ORDER — SODIUM CHLORIDE, SODIUM LACTATE, POTASSIUM CHLORIDE, CALCIUM CHLORIDE 600; 310; 30; 20 MG/100ML; MG/100ML; MG/100ML; MG/100ML
75 INJECTION, SOLUTION INTRAVENOUS CONTINUOUS
Status: DISCONTINUED | OUTPATIENT
Start: 2022-04-17 | End: 2022-04-22

## 2022-04-17 RX ORDER — ZOLPIDEM TARTRATE 5 MG/1
5 TABLET ORAL
Status: DISCONTINUED | OUTPATIENT
Start: 2022-04-17 | End: 2022-05-02 | Stop reason: HOSPADM

## 2022-04-17 RX ORDER — BETAMETHASONE SODIUM PHOSPHATE AND BETAMETHASONE ACETATE 3; 3 MG/ML; MG/ML
12 INJECTION, SUSPENSION INTRA-ARTICULAR; INTRALESIONAL; INTRAMUSCULAR; SOFT TISSUE EVERY 24 HOURS
Status: COMPLETED | OUTPATIENT
Start: 2022-04-17 | End: 2022-04-18

## 2022-04-17 RX ORDER — ACETAMINOPHEN 500 MG
1000 TABLET ORAL
Status: DISCONTINUED | OUTPATIENT
Start: 2022-04-17 | End: 2022-05-02 | Stop reason: HOSPADM

## 2022-04-17 RX ORDER — DOCUSATE SODIUM 100 MG/1
100 CAPSULE, LIQUID FILLED ORAL
Status: DISCONTINUED | OUTPATIENT
Start: 2022-04-17 | End: 2022-05-02 | Stop reason: HOSPADM

## 2022-04-17 RX ORDER — ONDANSETRON 2 MG/ML
4 INJECTION INTRAMUSCULAR; INTRAVENOUS
Status: DISCONTINUED | OUTPATIENT
Start: 2022-04-17 | End: 2022-05-02 | Stop reason: HOSPADM

## 2022-04-17 RX ORDER — DIPHENHYDRAMINE HYDROCHLORIDE 50 MG/ML
12.5 INJECTION, SOLUTION INTRAMUSCULAR; INTRAVENOUS
Status: DISCONTINUED | OUTPATIENT
Start: 2022-04-17 | End: 2022-05-02 | Stop reason: HOSPADM

## 2022-04-17 RX ORDER — MAGNESIUM SULFATE HEPTAHYDRATE 40 MG/ML
2 INJECTION, SOLUTION INTRAVENOUS ONCE
Status: COMPLETED | OUTPATIENT
Start: 2022-04-17 | End: 2022-04-17

## 2022-04-17 RX ORDER — SODIUM CHLORIDE 0.9 % (FLUSH) 0.9 %
5-40 SYRINGE (ML) INJECTION EVERY 8 HOURS
Status: DISCONTINUED | OUTPATIENT
Start: 2022-04-17 | End: 2022-05-02 | Stop reason: HOSPADM

## 2022-04-17 RX ADMIN — MAGNESIUM SULFATE HEPTAHYDRATE 2 G/HR: 500 INJECTION, SOLUTION INTRAMUSCULAR; INTRAVENOUS at 22:31

## 2022-04-17 RX ADMIN — BETAMETHASONE SODIUM PHOSPHATE AND BETAMETHASONE ACETATE 12 MG: 3; 3 INJECTION, SUSPENSION INTRA-ARTICULAR; INTRALESIONAL; INTRAMUSCULAR at 21:52

## 2022-04-17 RX ADMIN — SODIUM CHLORIDE, POTASSIUM CHLORIDE, SODIUM LACTATE AND CALCIUM CHLORIDE 75 ML/HR: 600; 310; 30; 20 INJECTION, SOLUTION INTRAVENOUS at 21:41

## 2022-04-17 RX ADMIN — MAGNESIUM SULFATE HEPTAHYDRATE 2 G: 40 INJECTION, SOLUTION INTRAVENOUS at 22:12

## 2022-04-17 RX ADMIN — MAGNESIUM SULFATE HEPTAHYDRATE 4 G: 40 INJECTION, SOLUTION INTRAVENOUS at 21:50

## 2022-04-17 RX ADMIN — ACETAMINOPHEN 1000 MG: 500 TABLET ORAL at 21:54

## 2022-04-17 RX ADMIN — SODIUM CHLORIDE, PRESERVATIVE FREE 10 ML: 5 INJECTION INTRAVENOUS at 22:13

## 2022-04-18 LAB
Lab: NORMAL
REFERENCE LAB,REFLB: NORMAL
TEST DESCRIPTION:,ATST: NORMAL

## 2022-04-18 PROCEDURE — 99285 EMERGENCY DEPT VISIT HI MDM: CPT

## 2022-04-18 PROCEDURE — 74011000250 HC RX REV CODE- 250: Performed by: OBSTETRICS & GYNECOLOGY

## 2022-04-18 PROCEDURE — 74011000258 HC RX REV CODE- 258: Performed by: OBSTETRICS & GYNECOLOGY

## 2022-04-18 PROCEDURE — 65410000002 HC RM PRIVATE OB

## 2022-04-18 PROCEDURE — 74011250636 HC RX REV CODE- 250/636: Performed by: OBSTETRICS & GYNECOLOGY

## 2022-04-18 RX ADMIN — MAGNESIUM SULFATE HEPTAHYDRATE 2 G/HR: 500 INJECTION, SOLUTION INTRAMUSCULAR; INTRAVENOUS at 19:10

## 2022-04-18 RX ADMIN — SODIUM CHLORIDE, PRESERVATIVE FREE 10 ML: 5 INJECTION INTRAVENOUS at 13:06

## 2022-04-18 RX ADMIN — BETAMETHASONE SODIUM PHOSPHATE AND BETAMETHASONE ACETATE 12 MG: 3; 3 INJECTION, SUSPENSION INTRA-ARTICULAR; INTRALESIONAL; INTRAMUSCULAR at 21:57

## 2022-04-18 RX ADMIN — MAGNESIUM SULFATE HEPTAHYDRATE 2 G/HR: 500 INJECTION, SOLUTION INTRAMUSCULAR; INTRAVENOUS at 14:06

## 2022-04-18 RX ADMIN — SODIUM CHLORIDE, POTASSIUM CHLORIDE, SODIUM LACTATE AND CALCIUM CHLORIDE 75 ML/HR: 600; 310; 30; 20 INJECTION, SOLUTION INTRAVENOUS at 11:00

## 2022-04-18 RX ADMIN — MAGNESIUM SULFATE HEPTAHYDRATE 2 G/HR: 500 INJECTION, SOLUTION INTRAMUSCULAR; INTRAVENOUS at 08:40

## 2022-04-18 RX ADMIN — MAGNESIUM SULFATE HEPTAHYDRATE 2 G/HR: 500 INJECTION, SOLUTION INTRAMUSCULAR; INTRAVENOUS at 03:24

## 2022-04-18 RX ADMIN — SODIUM CHLORIDE, PRESERVATIVE FREE 10 ML: 5 INJECTION INTRAVENOUS at 21:57

## 2022-04-18 NOTE — PROGRESS NOTES
0715: Bedside shift change report given to NIGEL Wakefield (oncoming nurse) by Andrea Perry. Shahrzad Hendrickson RN (offgoing nurse). Report included the following information SBAR and Kardex. 1915: Bedside shift change report given to GINGER Hendrickson RN (oncoming nurse) by NIGEL Orellana RN (offgoing nurse). Report included the following information SBAR and Kardex.

## 2022-04-18 NOTE — PROGRESS NOTES
Ante Partum Progress Note    Kristyn Daniel  [id number]    Assessment: 33 yo  @ 28w4d with vaginal bleeding in the setting of placenta previa (bilobed placenta with possibility of developing vasa previa. Vasa previa NOT noted on imaging today), elevated bps on admission  - Presented with bright red bleeding evening of , still with small amount of red blood/brown blood today. - no contractions on toco, feeling some slight cramping  - US on presentation breech, repeat scan by [facility] sonographer cephalic, efw 25%. DEMARCO 17. Plan:    - bleeding previa: Continue hospitalization with hospitalized bedrest. Continuous monitoring until 2nd BMZ. Has two large bore Ivs. Inpatient management until no bleeding x 48 hours. If considering dc would run this by Dr Oly Warren given placental abnormality. Rh pos. - prematurity: Complete bmz course - 2nd dose at 2200 tonight. Continue Mg through 2nd dose of BMZ, Sp hema consult  - Bp elevations resolved, normal cbc, cmp. Pr/cr could not be calculated but neg protein on urinalysis. If elevations recur would resend pr/cr    Orders/Charges: High    Patient states she has no new complaints    Vitals:  Visit Vitals  /70   Pulse (!) 102   Temp 98.1 °F (36.7 °C)   Resp 16   Ht 5' 2\" (1.575 m)   Wt 78.5 kg (173 lb)   LMP 06/15/2021   SpO2 97%   BMI 31.64 kg/m²     Temp (24hrs), Av.7 °F (37.1 °C), Min:97.9 °F (36.6 °C), Max:100.3 °F (37.9 °C)      Last 24hr Input/Output:    Intake/Output Summary (Last 24 hours) at 2022 1624  Last data filed at 2022 1600  Gross per 24 hour   Intake 2694.59 ml   Output 4100 ml   Net -1405.41 ml        Non stress test:  Reactive    Patient Vitals for the past 4 hrs:    Mode Fetal Heart Rate Fetal Activity Variability Decelerations Accelerations RN Reviewed Strip?   22 1600 External 125 Present 6-25 BPM None Yes Yes   22 1530 External 130 Present 6-25 BPM None No Yes   22 1430 External 130 Present 6-25 BPM None Yes Yes   22 1400 External 135 Present 6-25 BPM None Yes Yes   04/18/22 1330 External 140 Present 6-25 BPM None Yes Yes   04/18/22 1300 External 130 Present 6-25 BPM None Yes Yes   04/18/22 1230 External 130 Present 6-25 BPM None Yes Yes      Patient Vitals for the past 4 hrs: Mode Fetal Heart Rate Fetal Activity Variability Decelerations Accelerations RN Reviewed Strip?   04/18/22 1600 External 125 Present 6-25 BPM None Yes Yes   04/18/22 1530 External 130 Present 6-25 BPM None No Yes   04/18/22 1430 External 130 Present 6-25 BPM None Yes Yes   04/18/22 1400 External 135 Present 6-25 BPM None Yes Yes   04/18/22 1330 External 140 Present 6-25 BPM None Yes Yes   04/18/22 1300 External 130 Present 6-25 BPM None Yes Yes   04/18/22 1230 External 130 Present 6-25 BPM None Yes Yes        Uterine Activity: None     Exam:  Patient without distress.      Abdomen, fundus soft non-tender     Extremities, no redness or tenderness               Additional Exam: Deferred    Labs:     Lab Results   Component Value Date/Time    WBC 10.2 04/17/2022 09:25 PM    WBC 12.0 (H) 11/07/2019 06:33 PM    WBC 7.0 07/09/2018 09:25 PM    WBC 11.9 (H) 03/01/2017 05:13 PM    WBC 5.5 07/28/2014 09:52 AM    HGB 12.2 04/17/2022 09:25 PM    HGB 12.6 11/07/2019 06:33 PM    HGB 13.8 07/09/2018 09:25 PM    HGB 13.7 03/01/2017 05:13 PM    HGB 13.4 07/28/2014 09:52 AM    HCT 35.6 04/17/2022 09:25 PM    HCT 35.9 11/07/2019 06:33 PM    HCT 41.4 07/09/2018 09:25 PM    HCT 39.4 03/01/2017 05:13 PM    HCT 41.3 07/28/2014 09:52 AM    PLATELET 452 82/68/0423 09:25 PM    PLATELET 797 04/59/3296 06:33 PM    PLATELET 232 44/63/5814 09:25 PM    PLATELET 030 72/13/5559 05:13 PM    PLATELET 512 66/76/3822 09:52 AM       Recent Results (from the past 24 hour(s))   TYPE & SCREEN    Collection Time: 04/17/22  9:25 PM   Result Value Ref Range    Crossmatch Expiration 04/20/2022,2359     ABO/Rh(D) Bud Anton POSITIVE     Antibody screen NEG    CBC W/O DIFF    Collection Time: 04/17/22  9:25 PM Result Value Ref Range    WBC 10.2 3.6 - 11.0 K/uL    RBC 3.91 3.80 - 5.20 M/uL    HGB 12.2 11.5 - 16.0 g/dL    HCT 35.6 35.0 - 47.0 %    MCV 91.0 80.0 - 99.0 FL    MCH 31.2 26.0 - 34.0 PG    MCHC 34.3 30.0 - 36.5 g/dL    RDW 13.8 11.5 - 14.5 %    PLATELET 124 680 - 777 K/uL    MPV 10.3 8.9 - 12.9 FL    NRBC 0.0 0  WBC    ABSOLUTE NRBC 0.00 0.00 - 0.01 K/uL   PROTHROMBIN TIME + INR    Collection Time: 04/17/22  9:25 PM   Result Value Ref Range    INR 1.0 0.9 - 1.1      Prothrombin time 10.1 9.0 - 11.1 sec   PTT    Collection Time: 04/17/22  9:25 PM   Result Value Ref Range    aPTT 25.0 22.1 - 31.0 sec    aPTT, therapeutic range     58.0 - 77.0 SECS   SAMPLES BEING HELD    Collection Time: 04/17/22  9:25 PM   Result Value Ref Range    SAMPLES BEING HELD BLUE     COMMENT        Add-on orders for these samples will be processed based on acceptable specimen integrity and analyte stability, which may vary by analyte.    FIBRINOGEN    Collection Time: 04/17/22  9:25 PM   Result Value Ref Range    Fibrinogen 420 200 - 475 mg/dL   DRUG SCREEN, URINE    Collection Time: 04/17/22  9:38 PM   Result Value Ref Range    AMPHETAMINES Negative NEG      BARBITURATES Negative NEG      BENZODIAZEPINES Negative NEG      COCAINE Negative NEG      METHADONE Negative NEG      OPIATES Negative NEG      PCP(PHENCYCLIDINE) Negative NEG      THC (TH-CANNABINOL) Negative NEG      Drug screen comment (NOTE)    METABOLIC PANEL, COMPREHENSIVE    Collection Time: 04/17/22  9:38 PM   Result Value Ref Range    Sodium 140 136 - 145 mmol/L    Potassium 3.5 3.5 - 5.1 mmol/L    Chloride 110 (H) 97 - 108 mmol/L    CO2 20 (L) 21 - 32 mmol/L    Anion gap 10 5 - 15 mmol/L    Glucose 116 (H) 65 - 100 mg/dL    BUN 7 6 - 20 MG/DL    Creatinine 0.50 (L) 0.55 - 1.02 MG/DL    BUN/Creatinine ratio 14 12 - 20      GFR est AA >60 >60 ml/min/1.73m2    GFR est non-AA >60 >60 ml/min/1.73m2    Calcium 9.2 8.5 - 10.1 MG/DL    Bilirubin, total 0.2 0.2 - 1.0 MG/DL ALT (SGPT) 20 12 - 78 U/L    AST (SGOT) 11 (L) 15 - 37 U/L    Alk. phosphatase 69 45 - 117 U/L    Protein, total 6.6 6.4 - 8.2 g/dL    Albumin 3.0 (L) 3.5 - 5.0 g/dL    Globulin 3.6 2.0 - 4.0 g/dL    A-G Ratio 0.8 (L) 1.1 - 2.2     PROTEIN/CREATININE RATIO, URINE    Collection Time: 04/17/22  9:38 PM   Result Value Ref Range    Protein, urine random 8 0.0 - 11.9 mg/dL    Creatinine, urine <13.00 mg/dL    Protein/Creat. urine Ratio Cannot be calculated     URINALYSIS W/ REFLEX CULTURE    Collection Time: 04/17/22  9:38 PM    Specimen: Urine   Result Value Ref Range    Color YELLOW/STRAW      Appearance CLOUDY (A) CLEAR      Specific gravity <1.005 1.003 - 1.030    pH (UA) 7.0 5.0 - 8.0      Protein Negative NEG mg/dL    Glucose Negative NEG mg/dL    Ketone Negative NEG mg/dL    Bilirubin Negative NEG      Blood LARGE (A) NEG      Urobilinogen 0.2 0.2 - 1.0 EU/dL    Nitrites Negative NEG      Leukocyte Esterase TRACE (A) NEG      WBC 5-10 0 - 4 /hpf    RBC 5-10 0 - 5 /hpf    Epithelial cells MANY (A) FEW /lpf    Bacteria 2+ (A) NEG /hpf    UA:UC IF INDICATED CULTURE NOT INDICATED BY UA RESULT CNI     MISC.  LAB TEST    Collection Time: 04/17/22  9:38 PM   Result Value Ref Range    Test Description: URINE CREATININE, URINE PROTEIN     Reference Lab: [facility]     Results: Results scanned into [employer] under Media tab

## 2022-04-18 NOTE — CONSULTS
Neonatology Antepartum Consultation    Name: Naima Koch      Medical Record Number: [id number]      YOB: 1995     Today's Date: 2022                                                                 Date of Consultation:  2022  Time: 10:09 PM  Attending MD: Rosi SHAH  OB/GYN physician:   Referring Physician: : Dr.T. Ervin Olivares   Reason for Consultation:  Placenta previa with bleeding on admit    Subjective:     Age: 32 y.o.  Kya Butts  Para:   Gestation age: 29w2d      Maternal steroids:yes. Objective:     Medications:   Current Facility-Administered Medications   Medication Dose Route Frequency    sodium chloride (NS) flush 5-40 mL  5-40 mL IntraVENous Q8H    sodium chloride (NS) flush 5-40 mL  5-40 mL IntraVENous PRN    betamethasone (CELESTONE) injection 12 mg  12 mg IntraMUSCular Q24H    ondansetron (ZOFRAN) injection 4 mg  4 mg IntraVENous Q4H PRN    diphenhydrAMINE (BENADRYL) injection 12.5 mg  12.5 mg IntraVENous Q4H PRN    zolpidem (AMBIEN) tablet 5 mg  5 mg Oral QHS PRN    docusate sodium (COLACE) capsule 100 mg  100 mg Oral BID PRN    calcium gluconate 1 gram in sodium chloride (ISO-OSM) 50 mL infusion  1 g IntraVENous ONCE    lactated Ringers infusion  75 mL/hr IntraVENous CONTINUOUS    magnesium sulfate 10 gm/250 mL D5W infusion  2 g/hr IntraVENous CONTINUOUS    acetaminophen (TYLENOL) tablet 1,000 mg  1,000 mg Oral Q6H PRN     Rupture of Membrane:     Meconium Stained:       Data Review:  Lab:   Lab Results   Component Value Date/Time    ABO,Rh O Positive 2016 12:00 AM      Last Ultrasound: 1 month ago  Last Estimated Fetal Weight: unknown.     Assessment:     Active Problems:    Complete placenta previa nos or without hemorrhage, third trimester (2022)         OB Concerns/Plan:     Approximate survival statistics in overall population at this Gestation Age     98%  []    Explained limitation of statistics to parents    Common problems at this Gestation Age:   Asked by Dr.T. Sharyle Pippin to speak with thsi very pleasant mom , Mrs. Laurita Jefferson who is an O+ 33 yo G 4 P 2012 mom admitted with mod bleeding that started at 2000. Maternal labs neg. Urine cx-2+ bacteria. . Maternal history : maternal clotting studies wnl. GHTN, migraines, known bilopal placenta previa, miscarriage at 9 weeks on 8/23/21. UDS negative . Membranes intact. FHR stable . Meds: Magnesium , Beta meth started. Double footling breech presentation ,(piked) on brief US on admit. Mom updated on DR dykes, medical issues of infant born prematurely including but not limited to respiratory distress, need for breathing support, CPAP, Ventilator, need of umbilical lines, PIV, ng feeds, benefits of DBM and EBM. Mom verbalized understanding and signed consents for NICU procedures, blood consent and DBM consent. Mom encouraged to contact NICU team with any questions. I spent 30 minutes preparing and speaking with Mrs. Laurita Jefferson. Thank you so much for this consult! However, not limited to the above.     [x]    Explained NICU coverage and team approach  [x]    Answered questions  []    Offered tour  [x]    Obtained consents  [x]    Discussed Benefits of Breast Feeding    Signed: Jerardo Kelley NP  Date:      4/17/2022 2214

## 2022-04-18 NOTE — PROGRESS NOTES
Nutrition: MST referral received for EN/TPN PTA. Patient is receiving a regular diet. No indication of nutrition support noted after chart review. Nutrition assessment not indicated at this time. RD available by consult if needs arise. Thanks.   Daniel Dos Santos, RD

## 2022-04-18 NOTE — PROGRESS NOTES
. Grazyna Bonds is a 32 y.o.   at 28w3d patient of Dr Charla Trujillo at [facility] who presents to L&D triage with c/o vaginal bleeding since . She reports Positive FM, denies LOF and contractions. She also denies Headaches, Scotoma, RUQ pain and Edema. Urine sample obtained. EFM and toco placed for initial assessment. . Dr. Wilder Weiner at the bedside to assess patient. Speculum performed to assess bleeding at this time. . US performed, baby breech. Verbal orders received to start Magnesium Sulfate for neuro protection, betamethasone, labs, 2 IV's, etc. Dr. Wilder Weiner wants a total of 6g of Magnesium started before the maintenance dose of Magnesium, verbal orders given to GOMEZ Donovan RN    2150. 4g Magnesium Sulfate started. 2212. 2g Magnesium Sulfate started. 2231. 2g per hour Magnesium Sulfate started. 0710. Bedside shift change report given to LAYA Plascencia (oncoming nurse) by Constantino Parry RN (offgoing nurse). Report included the following information SBAR, Kardex, Procedure Summary, Intake/Output, MAR and Recent Results.

## 2022-04-18 NOTE — H&P
Antepartum History & Physical    Name: Shyann Rendon MRN: [id number]  SSN: [id number]    YOB: 1995  Age: 32 y.o. Sex: female      Subjective:     Reason for Admission:  Vaginal bleeding, known complete previa    History of Present Illness: Shyann Rendon is a 32 y.o.  female with an estimated gestational age of 29w2d with Estimated Date of Delivery: 22. Presents c/o vaginal bleeding that started at 2000 this evening. Known bi-loped placenta previa. Next US scheduled for . Has been following pelvic rest.  Reports unchanged BH ctx's. Reports good FM. States bleeding was enough to cover a wad of toilet paper. Denies HA/vision changes/RUQ pain. Denies cough/fever/SOB. This is the patient's first episode of bleeding this pregnancy.     Primary OB:   [facility] (Dr Ramin Prescott)    [facility]:  -  Complete bi-lobed placenta previa, some concern for possible development of vaso-previa (next US )  -  History of GHTN (BASA)    Prenatal labs:  O pos  Ab neg  HBsAg neg  HIV NR  GC/Chlam neg/neg  RI    OB History        4    Para   2    Term   2            AB    1    Living   2       SAB        IAB        Ectopic        Molar        Multiple   0    Live Births   2              Past Medical History:   Diagnosis Date    Adverse effect of anesthesia     MIGRAINE    Breast disorder     6 yrs ago lumpectomy for fibroadnoma    Essential hypertension     Gestational HTN    Nausea & vomiting      Past Surgical History:   Procedure Laterality Date    HX CYST INCISION AND DRAINAGE      HX WISDOM TEETH EXTRACTION      W[place] BREAST SURGERY PROCEDURE UNLISTED Right     lumpectomy for fibroadnoma6 years ago     Social History     Occupational History    Not on file   Tobacco Use    Smoking status: Never Smoker    Smokeless tobacco: Never Used   Substance and Sexual Activity    Alcohol use: No    Drug use: No    Sexual activity: Yes     Family History   Problem Relation Age of Onset    Hypertension Maternal Grandmother     Diabetes Maternal Grandmother        No Known Allergies  Prior to Admission medications    Medication Sig Start Date End Date Taking? Authorizing Provider   ibuprofen (MOTRIN) 800 mg tablet Take 1 Tablet by mouth every eight (8) hours as needed for Pain. 21   Mikael Preston MD   PNV no.33-ency-qdgqx acid-dha (PRENATAL DHA+COMPLETE PRENATAL) [id number] mg-mcg-mg cmpk Take  by mouth. Provider, Historical        Review of Systems    Denies F/C. Denies N/V. Denies HA/vision changes. Denies CP/Palp. Denies constipation/diarrhea. Denies urgency/dysuria. Denies cough/wheeze/SOB/sore throat. Denies cold/heat intolerance. Denies rash/bruise/bleed. Denies allergic reaction. Denies extremity weakness/swelling/pain. Denies anx/dep. Objective:     Vitals:    Vitals:    22   BP: (!) 140/75    Pulse: (!) 102    Resp: 18    Temp: 100.3 °F (37.9 °C)    SpO2: 99%    Weight:  78.5 kg (173 lb)   Height:  5' 2\" (1.575 m)      Temp (24hrs), Av.3 °F (37.9 °C), Min:100.3 °F (37.9 °C), Max:100.3 °F (37.9 °C)    BP  Min: 140/75  Max: 140/75     Physical Exam  Gen:  Obese WF, NAD, A&O  HEENT:  WNL  Heart:  RRR without m/g/r  Lungs:  CTAB without w/c/r  Abd:  Soft, NTTP, ND  LE:  Neg edema  Skin:  Neg rash  Spec:  NEFG, dark blood noted on inner thighs and perineum. Dark blood in vault. No active bleeding. Cx appears closed on visual inspection. Cervical Exam: deferred  Uterine Activity: None  Membranes: Intact  Fetal Heart Rate:  140 baseline, moderate variability, +10x10 accels, no decels, Cat I/reactive    Labs: No results found for this or any previous visit (from the past 24 hour(s)). Assessment and Plan:     31 yo  at 28+3:    - Vaginal bleeding (not hemorrhage) in the setting of known complete bi-lobed placenta previa. Reassuring fetal status.    Rh pos.    - Initial BP mild range (prior h/o GHTN), r/o PIH    - Double footling breech (piked)    1. Admit for management of bleeding previa  2. BMZ for FLM  3.  MagSulfate for neuroprotection  4. CBC, Coags, Fibrinogen, CMP, Pr:Cr, UA, UDS, T&S  5.  2 PIV's  6.  Neonatology consult  7. NPO for now  8. Continuous EFM  9. Complete bedrest/SCD's  10. Pt counseled on need for probable classical  in the event delivery is indicated in the near future      Signed By:  MD Trever Lawler.  General Omar MD, [facility]    2022

## 2022-04-18 NOTE — PROGRESS NOTES
Problem: Patient Education: Go to Patient Education Activity  Goal: Patient/Family Education  Outcome: Progressing Towards Goal     Problem: Antepartum: Day 1 through Discharge  Goal: Activity/Safety  Outcome: Progressing Towards Goal  Goal: Nutrition/Diet  Outcome: Progressing Towards Goal  Goal: Medications  Outcome: Progressing Towards Goal  Goal: *Hemodynamically stable  Outcome: Progressing Towards Goal  Goal: *Optimal pain control at patient's stated goal  Outcome: Progressing Towards Goal  Goal: *Tolerating diet  Outcome: Progressing Towards Goal  Goal: *Performs self perineal care  Outcome: Progressing Towards Goal  Goal: *Reassuring fetal surveillance  Outcome: Progressing Towards Goal  Goal: *Able to cope  Outcome: Progressing Towards Goal  Goal: *Absence of injury  Outcome: Progressing Towards Goal     Problem: Falls - Risk of  Goal: *Absence of Falls  Description: Document Mekhi Fall Risk and appropriate interventions in the flowsheet.   Outcome: Progressing Towards Goal  Note: Fall Risk Interventions:  Mobility Interventions: Patient to call before getting OOB         Medication Interventions: Patient to call before getting OOB,Teach patient to arise slowly    Elimination Interventions: Call light in reach              Problem: Patient Education: Go to Patient Education Activity  Goal: Patient/Family Education  Outcome: Progressing Towards Goal

## 2022-04-19 PROCEDURE — 65410000002 HC RM PRIVATE OB

## 2022-04-19 NOTE — PROGRESS NOTES
0845-Spoke with Dr. Alden Goldsmith regarding monitoring status and pt ability to shower. Orders received for NST tid and pt may shower after an NST if reactive and without ctx.

## 2022-04-19 NOTE — PROGRESS NOTES
Ante Partum Progress Note    Alex Sotomayor  28w5d    Assessment: 33 yo  @ 28w5d with vaginal bleeding in the setting of placenta previa (bilobed placenta with possibility of developing vasa previa. Vasa previa NOT noted on imaging yesterday - appears instead to be a posterior previa), elevated bps on admission  - Presented with bright red bleeding evening of , still with small amount of red blood/brown blood today. - no contractions on toco, feeling some slight cramping  - US on presentation breech, repeat scan by [facility] sonographer cephalic, efw 77%. DEMARCO 17. Plan:    - bleeding previa: Continue hospitalization with hospitalized bedrest. Continuous monitoring through 2nd BM - now with TID monitoring. Has two large bore Ivs. Inpatient management until no bleeding x 48 hours. If considering dc would run this by Dr Kusum Ernandez given placental abnormality. Rh pos. - prematurity: Completed bmz course --. S/p 48hrs mag. Sp hema consult  - Bp elevations resolved, normal cbc, cmp. Pr/cr could not be calculated but neg protein on urinalysis. If elevations recur would resend pr/cr    Orders/Charges: High    Patient states she has no new complaints    Vitals:  Visit Vitals  /73 (BP 1 Location: Right upper arm, BP Patient Position: At rest)   Pulse (!) 104   Temp 98.4 °F (36.9 °C)   Resp 17   Ht 5' 2\" (1.575 m)   Wt 78.5 kg (173 lb)   LMP 06/15/2021   SpO2 97%   BMI 31.64 kg/m²     Temp (24hrs), Av.1 °F (36.7 °C), Min:97.9 °F (36.6 °C), Max:98.4 °F (36.9 °C)      Last 24hr Input/Output:    Intake/Output Summary (Last 24 hours) at 2022 0948  Last data filed at 2022 2200  Gross per 24 hour   Intake 2219.17 ml   Output 3200 ml   Net -980.83 ml        Non stress test:    Baseline 120s, Moderate variability, No decelerations, No accelerations but tracing reassuring for GA    Patient Vitals for the past 4 hrs:    Mode Fetal Heart Rate Fetal Activity   22 0810 External 155 Present      Patient Vitals for the past 4 hrs: Mode Fetal Heart Rate Fetal Activity   04/19/22 0810 External 155 Present        Uterine Activity: None     Exam:  Patient without distress. Abdomen, fundus soft non-tender     Extremities, no redness or tenderness               Additional Exam: Deferred    Labs:     Lab Results   Component Value Date/Time    WBC 10.2 04/17/2022 09:25 PM    WBC 12.0 (H) 11/07/2019 06:33 PM    WBC 7.0 07/09/2018 09:25 PM    WBC 11.9 (H) 03/01/2017 05:13 PM    WBC 5.5 07/28/2014 09:52 AM    HGB 12.2 04/17/2022 09:25 PM    HGB 12.6 11/07/2019 06:33 PM    HGB 13.8 07/09/2018 09:25 PM    HGB 13.7 03/01/2017 05:13 PM    HGB 13.4 07/28/2014 09:52 AM    HCT 35.6 04/17/2022 09:25 PM    HCT 35.9 11/07/2019 06:33 PM    HCT 41.4 07/09/2018 09:25 PM    HCT 39.4 03/01/2017 05:13 PM    HCT 41.3 07/28/2014 09:52 AM    PLATELET 219 10/77/7986 09:25 PM    PLATELET 451 00/17/1597 06:33 PM    PLATELET 410 42/47/6788 09:25 PM    PLATELET 869 11/22/2357 05:13 PM    PLATELET 511 72/89/6287 09:52 AM       No results found for this or any previous visit (from the past 24 hour(s)).     Kat Rabago MD

## 2022-04-19 NOTE — PROGRESS NOTES
1910. Bedside shift change report given to GINGER Babcock, ALISON (oncoming nurse) by LAYA Wood RN (offgoing nurse). Report included the following information SBAR, Kardex, Procedure Summary, Intake/Output, MAR and Recent Results. 2157. 2nd dose of Betamethasone given. 2158. Magnesium Sulfate turned off after 24hrs per      9133. Bedside shift change report given to LAYA Flores (oncoming nurse) by Maday Babcock, ALISON (offgoing nurse). Report included the following information SBAR, Kardex, Procedure Summary, Intake/Output, MAR and Recent Results.

## 2022-04-19 NOTE — ROUTINE PROCESS
Bedside shift change report given to NIGEL Dodd (oncoming nurse) by Viktoriya Gudino RN (offgoing nurse). Report included the following information SBAR, Kardex, Intake/Output, MAR and Recent Results.

## 2022-04-20 PROCEDURE — 74011000250 HC RX REV CODE- 250: Performed by: OBSTETRICS & GYNECOLOGY

## 2022-04-20 PROCEDURE — 65410000002 HC RM PRIVATE OB

## 2022-04-20 RX ADMIN — SODIUM CHLORIDE, PRESERVATIVE FREE 10 ML: 5 INJECTION INTRAVENOUS at 22:05

## 2022-04-20 RX ADMIN — SODIUM CHLORIDE, PRESERVATIVE FREE 10 ML: 5 INJECTION INTRAVENOUS at 02:09

## 2022-04-20 NOTE — PROGRESS NOTES
Ante Partum Progress Note    Candace Ovalles  28w6d    Assessment: 28w6d   Assessment: 31 yo   with vaginal bleeding in the setting of placenta previa (bilobed placenta with possibility of developing vasa previa. Vasa previa NOT noted on imaging yesterday - appears instead to be a posterior previa), elevated bps on admission  - Presented with bright red bleeding evening of , still with small amount of  brown blood today, no red bleeding since .   - no contractions on toco, feeling some slight cramping  - US on presentation breech, repeat scan by [facility] sonographer cephalic, efw 84%. DEMARCO 17.     Plan:    - bleeding previa: Continue hospitalization with hospitalized bedrest. Continuous monitoring through 2nd BMZ - now with TID monitoring. Has two large bore Ivs. Inpatient management until no bleeding x 48 hours. If considering dc would run this by Dr Jordy Givens given placental abnormality. Rh pos. - prematurity: Completed bmz course --. S/p 48hrs mag. Sp hema consult  - Bp elevations resolved, normal cbc, cmp. Pr/cr could not be calculated but neg protein on urinalysis. If elevations recur would resend pr/cr     Orders/Charges: High       Patient states she has no new complaints. Sometimes when she wipes there is some old/brown blood but not everytime. Vitals:  Visit Vitals  /67 (BP 1 Location: Right arm, BP Patient Position: Sitting)   Pulse 90   Temp 98.2 °F (36.8 °C)   Resp 16   Ht 5' 2\" (1.575 m)   Wt 78.5 kg (173 lb)   LMP 06/15/2021   SpO2 97%   BMI 31.64 kg/m²     Temp (24hrs), Av.1 °F (36.7 °C), Min:98 °F (36.7 °C), Max:98.2 °F (36.8 °C)      Last 24hr Input/Output:  No intake or output data in the 24 hours ending 22 1554     Non stress test:  Reactive  An NST was performed and was reactive. The baseline FHR was appropriate. Moderate baseline  variability was noted. Accelerations of sufficient amplitude and duration were noted. There were no decelerations noted.       No data found. No data found. Uterine Activity: None     Exam:  Patient without distress. Abdomen, fundus soft non-tender     Extremities, no redness or tenderness               Additional Exam: Deferred    Labs:     Lab Results   Component Value Date/Time    WBC 10.2 04/17/2022 09:25 PM    WBC 12.0 (H) 11/07/2019 06:33 PM    WBC 7.0 07/09/2018 09:25 PM    WBC 11.9 (H) 03/01/2017 05:13 PM    WBC 5.5 07/28/2014 09:52 AM    HGB 12.2 04/17/2022 09:25 PM    HGB 12.6 11/07/2019 06:33 PM    HGB 13.8 07/09/2018 09:25 PM    HGB 13.7 03/01/2017 05:13 PM    HGB 13.4 07/28/2014 09:52 AM    HCT 35.6 04/17/2022 09:25 PM    HCT 35.9 11/07/2019 06:33 PM    HCT 41.4 07/09/2018 09:25 PM    HCT 39.4 03/01/2017 05:13 PM    HCT 41.3 07/28/2014 09:52 AM    PLATELET 605 79/76/7447 09:25 PM    PLATELET 385 43/64/3395 06:33 PM    PLATELET 425 96/77/5095 09:25 PM    PLATELET 972 90/65/8736 05:13 PM    PLATELET 141 66/13/5921 09:52 AM       No results found for this or any previous visit (from the past 24 hour(s)).

## 2022-04-20 NOTE — ROUTINE PROCESS
Bedside and Verbal shift change report given to Viktoriya Gudino RN (oncoming nurse) by NIGEL Dodd (offgoing nurse). Report included the following information SBAR, Kardex, Procedure Summary, Intake/Output, MAR and Recent Results.

## 2022-04-20 NOTE — PROGRESS NOTES
Problem: Falls - Risk of  Goal: *Absence of Falls  Description: Document Ham Islas Fall Risk and appropriate interventions in the flowsheet.   Outcome: Progressing Towards Goal  Note: Fall Risk Interventions:  Mobility Interventions: Communicate number of staff needed for ambulation/transfer         Medication Interventions: Teach patient to arise slowly,Patient to call before getting OOB    Elimination Interventions: Call light in reach

## 2022-04-21 PROCEDURE — 59025 FETAL NON-STRESS TEST: CPT

## 2022-04-21 PROCEDURE — 65410000002 HC RM PRIVATE OB

## 2022-04-21 PROCEDURE — 74011000250 HC RX REV CODE- 250: Performed by: OBSTETRICS & GYNECOLOGY

## 2022-04-21 RX ADMIN — SODIUM CHLORIDE, PRESERVATIVE FREE 10 ML: 5 INJECTION INTRAVENOUS at 22:16

## 2022-04-21 RX ADMIN — SODIUM CHLORIDE, PRESERVATIVE FREE 10 ML: 5 INJECTION INTRAVENOUS at 11:35

## 2022-04-21 NOTE — ROUTINE PROCESS
Bedside and Verbal shift change report given to SARITA Kaiser RN (oncoming nurse) by NIGEL Juan (offgoing nurse). Report included the following information SBAR, Kardex, Procedure Summary, Intake/Output, MAR and Recent Results.

## 2022-04-21 NOTE — PROGRESS NOTES
Spiritual Care Assessment/Progress Note  [facility]      NAME: Kristyn Daniel      MRN: [id number]  AGE: 32 y.o.  SEX: female  Synagogue Affiliation: Church   Language: English     4/21/2022     Total Time (in minutes): 14     Spiritual Assessment begun in [facility] LABOR [facility] DELIVERY through conversation with:         [x]Patient        [] Family    [] Friend(s)        Reason for Consult: Initial/Spiritual assessment, patient floor     Spiritual beliefs: (Please include comment if needed)     [x] Identifies with a isabelle tradition: Church       [] Supported by a isabelle community:            [] Claims no spiritual orientation:           [] Seeking spiritual identity:                [] Adheres to an individual form of spirituality:           [] Not able to assess:                           Identified resources for coping:      [] Prayer                               [] Music                  [] Guided Imagery     [x] Family/friends                 [] Pet visits     [] Devotional reading                         [] Unknown     [] Other:                                               Interventions offered during this visit: (See comments for more details)    Patient Interventions: Affirmation of emotions/emotional suffering,Catharsis/review of pertinent events in supportive environment,Coping skills reviewed/reinforced,Initial/Spiritual assessment, patient floor           Plan of Care:     [] Support spiritual and/or cultural needs    [] Support AMD and/or advance care planning process      [] Support grieving process   [] Coordinate Rites and/or Rituals    [] Coordination with community clergy   [] No spiritual needs identified at this time   [] Detailed Plan of Care below (See Comments)  [] Make referral to Music Therapy  [] Make referral to Pet Therapy     [] Make referral to Addiction services  [] Make referral to [facility]  [] Make referral to Spiritual Care Partner  [] No future visits requested [x] Contact Spiritual Care for further referrals     Comments:     Initial Spiritual Care Assessment visit for Mrs. Marta Benton in 3098. Reviewed the chart notes before visit. Patient was alert, no family was present during the visit. Patient shared about her delivery due date, her two children (11, 2.6 yo) and her support.  provided attentive listening and supportive presence. Advised of  availability. She was thankful.     [id number] [facility] MARTIN Hanson.Div,Th.M, [place] [address] Provider   Paging Service [phone])

## 2022-04-21 NOTE — PROGRESS NOTES
Problem: Falls - Risk of  Goal: *Absence of Falls  Description: Document Maryjane Brasher Fall Risk and appropriate interventions in the flowsheet.   Outcome: Progressing Towards Goal  Note: Fall Risk Interventions:  Mobility Interventions: Communicate number of staff needed for ambulation/transfer         Medication Interventions: Teach patient to arise slowly,Patient to call before getting OOB    Elimination Interventions: Call light in reach

## 2022-04-21 NOTE — PROGRESS NOTES
Bedside and Verbal shift change report given to NIGEL Pineda (oncoming nurse) by Mohit Morales RN (offgoing nurse). Report included the following information SBAR,  Intake/Output and MAR.

## 2022-04-21 NOTE — PROGRESS NOTES
Ante Partum Progress Note    Isra Welch  29w0d    Assessment: 29w0d   Assessment: 33 yo   with vaginal bleeding in the setting of placenta previa (bilobed placenta with possibility of developing vasa previa. Vasa previa NOT noted on imaging yesterday - appears instead to be a posterior previa), elevated bps on admission  - Presented with bright red bleeding evening of , still with small amount of  brown blood today, no red bleeding since .   - no contractions on toco, feeling some slight cramping  - US on presentation breech, repeat scan by [facility] sonographer cephalic, efw 15%. DEMARCO 17.     Plan:    - bleeding previa: Continue hospitalization with hospitalized bedrest. Continuous monitoring through 2nd BMZ - now with TID monitoring. Has two large bore Ivs. Inpatient management until no bleeding x 48 hours. If considering dc would run this by Dr Iraida Pineda given placental abnormality. Rh pos. - prematurity: Completed bmz course --. S/p 48hrs mag. Sp hema consult  - Bp elevations resolved, normal cbc, cmp. Pr/cr could not be calculated but neg protein on urinalysis. If elevations recur would resend pr/cr     Orders/Charges: High       Patient states she has no new complaints. Sometimes when she wipes there is some old/brown blood but not everytime. This has not changed in the past several days. Vitals:  Visit Vitals  /64 (BP 1 Location: Right upper arm, BP Patient Position: At rest)   Pulse 99   Temp 98.5 °F (36.9 °C)   Resp 16   Ht 5' 2\" (1.575 m)   Wt 78.5 kg (173 lb)   LMP 06/15/2021   SpO2 98%   BMI 31.64 kg/m²     Temp (24hrs), Av.1 °F (36.7 °C), Min:97.9 °F (36.6 °C), Max:98.5 °F (36.9 °C)      Last 24hr Input/Output:  No intake or output data in the 24 hours ending 22 1331     Non stress test:  Reactive  An NST was performed and was reactive. The baseline FHR was appropriate. Moderate baseline  variability was noted.  Accelerations of sufficient amplitude and duration were noted.  There were no decelerations noted. Patient Vitals for the past 4 hrs: Mode Fetal Heart Rate Fetal Activity Variability Decelerations Accelerations RN Reviewed Strip? Non Stress Test   04/21/22 1020 External 150 Present 6-25 BPM None Yes Yes Reactive   04/21/22 1010 External 150 Present -- -- -- -- --   04/21/22 1000 External 145 Present -- -- -- -- --      Patient Vitals for the past 4 hrs: Mode Fetal Heart Rate Fetal Activity Variability Decelerations Accelerations RN Reviewed Strip? Non Stress Test   04/21/22 1020 External 150 Present 6-25 BPM None Yes Yes Reactive   04/21/22 1010 External 150 Present -- -- -- -- --   04/21/22 1000 External 145 Present -- -- -- -- --        Uterine Activity: None     Exam:  Patient without distress. Abdomen, fundus soft non-tender     Extremities, no redness or tenderness               Additional Exam: Deferred    Labs:     Lab Results   Component Value Date/Time    WBC 10.2 04/17/2022 09:25 PM    WBC 12.0 (H) 11/07/2019 06:33 PM    WBC 7.0 07/09/2018 09:25 PM    WBC 11.9 (H) 03/01/2017 05:13 PM    WBC 5.5 07/28/2014 09:52 AM    HGB 12.2 04/17/2022 09:25 PM    HGB 12.6 11/07/2019 06:33 PM    HGB 13.8 07/09/2018 09:25 PM    HGB 13.7 03/01/2017 05:13 PM    HGB 13.4 07/28/2014 09:52 AM    HCT 35.6 04/17/2022 09:25 PM    HCT 35.9 11/07/2019 06:33 PM    HCT 41.4 07/09/2018 09:25 PM    HCT 39.4 03/01/2017 05:13 PM    HCT 41.3 07/28/2014 09:52 AM    PLATELET 584 38/07/5516 09:25 PM    PLATELET 885 69/81/0750 06:33 PM    PLATELET 351 10/22/5239 09:25 PM    PLATELET 893 66/48/7125 05:13 PM    PLATELET 183 55/38/1507 09:52 AM       No results found for this or any previous visit (from the past 24 hour(s)).

## 2022-04-22 PROCEDURE — 59025 FETAL NON-STRESS TEST: CPT

## 2022-04-22 PROCEDURE — 65410000002 HC RM PRIVATE OB

## 2022-04-22 PROCEDURE — 74011000250 HC RX REV CODE- 250: Performed by: OBSTETRICS & GYNECOLOGY

## 2022-04-22 RX ADMIN — SODIUM CHLORIDE, PRESERVATIVE FREE 10 ML: 5 INJECTION INTRAVENOUS at 07:26

## 2022-04-22 NOTE — ROUTINE PROCESS
Bedside and Verbal shift change report given to Henry Tomlinson RN (oncoming nurse) by Maged Mauricio RN (offgoing nurse). Report included the following information SBAR, Procedure Summary, Intake/Output and MAR.

## 2022-04-22 NOTE — PROGRESS NOTES
Ante Partum Progress Note    Payton Turcios  29w1d    Assessment: 29w1d   31 yo   with vaginal bleeding in the setting of placenta previa (bilobed placenta with possibility of developing vasa previa. Vasa previa NOT noted on imaging yesterday - appears instead to be a posterior previa), elevated bps on admission  - Presented with bright red bleeding evening of , no red bleeding since . Brown bleeding stopped yesterday  - no contractions on toco, feeling some slight cramping  - US on presentation breech, repeat scan by [facility] sonographer - cephalic, efw 54%. DEMARCO 17.     Plan:    - bleeding previa: Continue hospitalization with hospitalized bedrest. Continuous monitoring through  [facility] - now with TID monitoring. Has two large bore IVs. Inpatient management until no bleeding x at least 48 hours. [facility]. Rh pos. - prematurity: Completed bmz course --. S/p 48hrs mag. Sp hema consult  - noted to have some borderline elevated BPs and had normal labs- all recent BPs have been normal      Orders/Charges: High    Patient states she does not have headache , abdominal pain  , contractions, right upper quadrant pain  , vaginal bleeding , swelling, vaginal leaking of fluid  and reports active FM    Vitals:  Visit Vitals  /67 (BP 1 Location: Right upper arm, BP Patient Position: At rest)   Pulse (!) 110   Temp 98.8 °F (37.1 °C)   Resp 20   Ht 5' 2\" (1.575 m)   Wt 78.5 kg (173 lb)   LMP 06/15/2021   SpO2 99%   BMI 31.64 kg/m²     Temp (24hrs), Av.2 °F (36.8 °C), Min:97.8 °F (36.6 °C), Max:98.8 °F (37.1 °C)      Last 24hr Input/Output:  No intake or output data in the 24 hours ending 22 1532     Non stress test:  Reactive    No data found. No data found. Uterine Activity: None     Exam:  Patient without distress.      Abdomen, fundus soft non-tender     Extremities, no redness or tenderness               Additional Exam: Deferred    Labs:     Lab Results   Component Value Date/Time    WBC 10.2 04/17/2022 09:25 PM    WBC 12.0 (H) 11/07/2019 06:33 PM    WBC 7.0 07/09/2018 09:25 PM    WBC 11.9 (H) 03/01/2017 05:13 PM    WBC 5.5 07/28/2014 09:52 AM    HGB 12.2 04/17/2022 09:25 PM    HGB 12.6 11/07/2019 06:33 PM    HGB 13.8 07/09/2018 09:25 PM    HGB 13.7 03/01/2017 05:13 PM    HGB 13.4 07/28/2014 09:52 AM    HCT 35.6 04/17/2022 09:25 PM    HCT 35.9 11/07/2019 06:33 PM    HCT 41.4 07/09/2018 09:25 PM    HCT 39.4 03/01/2017 05:13 PM    HCT 41.3 07/28/2014 09:52 AM    PLATELET 700 22/91/6583 09:25 PM    PLATELET 324 80/42/9720 06:33 PM    PLATELET 847 79/91/2711 09:25 PM    PLATELET 439 71/63/6595 05:13 PM    PLATELET 957 24/83/9891 09:52 AM       No results found for this or any previous visit (from the past 24 hour(s)).

## 2022-04-22 NOTE — ROUTINE PROCESS
Bedside and Verbal shift change report given to SARITA Kaiser RN (oncoming nurse) by NIGEL Schroeder (offgoing nurse). Report included the following information SBAR, Kardex, Procedure Summary, Intake/Output, MAR and Recent Results.

## 2022-04-22 NOTE — PROGRESS NOTES
Problem: Falls - Risk of  Goal: *Absence of Falls  Description: Document Quan Mariano Fall Risk and appropriate interventions in the flowsheet.   Outcome: Progressing Towards Goal  Note: Fall Risk Interventions:  Mobility Interventions: Communicate number of staff needed for ambulation/transfer         Medication Interventions: Teach patient to arise slowly,Patient to call before getting OOB    Elimination Interventions: Call light in reach

## 2022-04-23 PROCEDURE — 74011000250 HC RX REV CODE- 250: Performed by: OBSTETRICS & GYNECOLOGY

## 2022-04-23 PROCEDURE — 65410000002 HC RM PRIVATE OB

## 2022-04-23 RX ADMIN — SODIUM CHLORIDE, PRESERVATIVE FREE 10 ML: 5 INJECTION INTRAVENOUS at 16:00

## 2022-04-23 RX ADMIN — SODIUM CHLORIDE, PRESERVATIVE FREE 10 ML: 5 INJECTION INTRAVENOUS at 06:45

## 2022-04-23 NOTE — ROUTINE PROCESS
Bedside and Verbal shift change report given to GINGER Day RN  (oncoming nurse) by NIGEL Rivera (offgoing nurse). Report included the following information SBAR, Kardex, Procedure Summary, Intake/Output, MAR and Recent Results.

## 2022-04-23 NOTE — ROUTINE PROCESS
1930 Bedside shift change report given to HUBER Francisco RN (oncoming nurse) by Roxann Neville. Amy [facility] (offgoing nurse). Report included the following information SBAR, Kardex, Intake/Output and MAR.

## 2022-04-23 NOTE — PROGRESS NOTES
Ante Partum Progress Note    Altagracia Norman  29w2d    Assessment: 29w2d   31 yo   with vaginal bleeding in the setting of placenta previa (bilobed placenta with possibility of developing vasa previa. Vasa previa NOT noted on imaging yesterday - appears instead to be a posterior previa), elevated bps on admission  - Presented with bright red bleeding evening of , no red bleeding since . Brown bleeding stopped yesterday  - no contractions on toco, feeling some slight cramping  - US on presentation breech, repeat scan by [facility] sonographer 3/65- cephalic, efw 28%. DEMARCO 17.     Plan:    - bleeding previa: Continue hospitalization with hospitalized bedrest. Continuous monitoring through  BM - now with TID monitoring. Has two large bore IVs. Inpatient management until no bleeding x at least 48 hours. If [facility] dc [facility]. Rh pos. - prematurity: Completed bmz course --. S/p 48hrs mag. Sp hema consult  - noted to have some borderline elevated BPs and had normal labs- all recent BPs have been normal    Orders/Charges: High    Patient states she does not have headache , abdominal pain  , contractions, right upper quadrant pain  , vaginal bleeding , swelling, vaginal leaking of fluid  and reports active FM    Vitals:  Visit Vitals  /71 (BP Patient Position: At rest)   Pulse (!) 108   Temp 97.8 °F (36.6 °C)   Resp 18   Ht 5' 2\" (1.575 m)   Wt 78.5 kg (173 lb)   LMP 06/15/2021   SpO2 98%   BMI 31.64 kg/m²     Temp (24hrs), Av.9 °F (36.6 °C), Min:97.7 °F (36.5 °C), Max:98.3 °F (36.8 °C)      Last 24hr Input/Output:  No intake or output data in the 24 hours ending 22 1022     Non stress test:  Reactive    Patient Vitals for the past 4 hrs: Mode   22 0905 (P) External      Patient Vitals for the past 4 hrs: Mode   22 0905 (P) External        Uterine Activity: None     Exam:  Patient without distress.      Abdomen, fundus soft non-tender     Extremities, no redness or tenderness               Additional Exam: Deferred    Labs:     Lab Results   Component Value Date/Time    WBC 10.2 04/17/2022 09:25 PM    WBC 12.0 (H) 11/07/2019 06:33 PM    WBC 7.0 07/09/2018 09:25 PM    WBC 11.9 (H) 03/01/2017 05:13 PM    WBC 5.5 07/28/2014 09:52 AM    HGB 12.2 04/17/2022 09:25 PM    HGB 12.6 11/07/2019 06:33 PM    HGB 13.8 07/09/2018 09:25 PM    HGB 13.7 03/01/2017 05:13 PM    HGB 13.4 07/28/2014 09:52 AM    HCT 35.6 04/17/2022 09:25 PM    HCT 35.9 11/07/2019 06:33 PM    HCT 41.4 07/09/2018 09:25 PM    HCT 39.4 03/01/2017 05:13 PM    HCT 41.3 07/28/2014 09:52 AM    PLATELET 150 09/00/8590 09:25 PM    PLATELET 044 06/48/9951 06:33 PM    PLATELET 763 55/63/0037 09:25 PM    PLATELET 044 48/45/8272 05:13 PM    PLATELET 385 74/80/4330 09:52 AM       No results found for this or any previous visit (from the past 24 hour(s)).

## 2022-04-24 LAB
ABO + RH BLD: NORMAL
BASOPHILS # BLD: 0 K/UL (ref 0–0.1)
BASOPHILS NFR BLD: 0 % (ref 0–1)
BLOOD GROUP ANTIBODIES SERPL: NORMAL
DIFFERENTIAL METHOD BLD: ABNORMAL
EOSINOPHIL # BLD: 0.3 K/UL (ref 0–0.4)
EOSINOPHIL NFR BLD: 3 % (ref 0–7)
ERYTHROCYTE [DISTWIDTH] IN BLOOD BY AUTOMATED COUNT: 13.8 % (ref 11.5–14.5)
HCT VFR BLD AUTO: 35.7 % (ref 35–47)
HGB BLD-MCNC: 12 G/DL (ref 11.5–16)
IMM GRANULOCYTES # BLD AUTO: 0 K/UL (ref 0–0.04)
IMM GRANULOCYTES NFR BLD AUTO: 0 % (ref 0–0.5)
LYMPHOCYTES # BLD: 1.7 K/UL (ref 0.8–3.5)
LYMPHOCYTES NFR BLD: 15 % (ref 12–49)
MCH RBC QN AUTO: 31.2 PG (ref 26–34)
MCHC RBC AUTO-ENTMCNC: 33.6 G/DL (ref 30–36.5)
MCV RBC AUTO: 92.7 FL (ref 80–99)
METAMYELOCYTES NFR BLD MANUAL: 2 %
MONOCYTES # BLD: 0.6 K/UL (ref 0–1)
MONOCYTES NFR BLD: 5 % (ref 5–13)
NEUTS BAND NFR BLD MANUAL: 1 %
NEUTS SEG # BLD: 8.6 K/UL (ref 1.8–8)
NEUTS SEG NFR BLD: 74 % (ref 32–75)
NRBC # BLD: 0 K/UL (ref 0–0.01)
NRBC BLD-RTO: 0 PER 100 WBC
PLATELET # BLD AUTO: 156 K/UL (ref 150–400)
PMV BLD AUTO: 10.3 FL (ref 8.9–12.9)
RBC # BLD AUTO: 3.85 M/UL (ref 3.8–5.2)
RBC MORPH BLD: ABNORMAL
SPECIMEN EXP DATE BLD: NORMAL
WBC # BLD AUTO: 11.5 K/UL (ref 3.6–11)
WBC MORPH BLD: ABNORMAL

## 2022-04-24 PROCEDURE — 65410000002 HC RM PRIVATE OB

## 2022-04-24 PROCEDURE — 85025 COMPLETE CBC W/AUTO DIFF WBC: CPT

## 2022-04-24 PROCEDURE — 74011250637 HC RX REV CODE- 250/637: Performed by: OBSTETRICS & GYNECOLOGY

## 2022-04-24 PROCEDURE — 86900 BLOOD TYPING SEROLOGIC ABO: CPT

## 2022-04-24 PROCEDURE — 36415 COLL VENOUS BLD VENIPUNCTURE: CPT

## 2022-04-24 PROCEDURE — 74011000250 HC RX REV CODE- 250: Performed by: OBSTETRICS & GYNECOLOGY

## 2022-04-24 RX ADMIN — ACETAMINOPHEN 1000 MG: 500 TABLET ORAL at 19:31

## 2022-04-24 RX ADMIN — SODIUM CHLORIDE, PRESERVATIVE FREE 10 ML: 5 INJECTION INTRAVENOUS at 16:18

## 2022-04-24 NOTE — PROGRESS NOTES
1930 - Assumed care of patient from . Jacqueline Garza, RN    [id number] - Bedside shift change report given to JOY Murphy RN (oncoming nurse) by HUBER Higuera RN (offgoing nurse). Report included the following information SBAR, Kardex, Intake/Output and MAR.

## 2022-04-24 NOTE — PROGRESS NOTES
Ante Partum Progress Note    Rahat Lakhani  29w3d    Assessment: 29w3d   33 yo   with vaginal bleeding in the setting of placenta previa (bilobed placenta with possibility of developing vasa previa. Vasa previa NOT noted on imaging yesterday - appears instead to be a posterior previa), elevated bps on admission  - Presented with bright red bleeding evening of - red bleeding stopped . Brown bleeding stopped  until this morning when she has seen a little bright red blood just with wiping  - no contractions on toco, feeling some slight cramping on and off  - US on presentation breech, repeat scan by [facility] sonographer - cephalic, efw 63%. DEMARCO 17.     Plan:    - bleeding previa: Continue hospitalization with hospitalized bedrest. Continuous monitoring through  BM - now with TID monitoring. Has two large bore IVs. Inpatient management until no bleeding x at least 48 hours. If considering dc would run this by Dr MONIKA BROWN CZGJPHILLIP placental abnormality. Rh pos. - prematurity: Completed bmz course --. S/p 48hrs mag. Sp hema consult  - noted to have some borderline elevated BPs and had normal labs- all recent BPs have been normal    Orders/Charges: Medium    Patient states she does not have headache , abdominal pain  , contractions, vaginal leaking of fluid  and reports active FM. She has seen some bright red blood on the tissue paper with wiping today. Vitals:  Visit Vitals  /70   Pulse 96   Temp 98.1 °F (36.7 °C)   Resp 16   Ht 5' 2\" (1.575 m)   Wt 78.5 kg (173 lb)   LMP 06/15/2021   SpO2 96%   BMI 31.64 kg/m²     Temp (24hrs), Av °F (36.7 °C), Min:97.7 °F (36.5 °C), Max:98.3 °F (36.8 °C)      Last 24hr Input/Output:  No intake or output data in the 24 hours ending 22 1137     Non stress test:  Reactive    Patient Vitals for the past 4 hrs: Mode Fetal Heart Rate Variability Decelerations Accelerations RN Reviewed Strip?  Non Stress Test   22 0815 External 140 6-25 BPM None Yes Yes Reactive   04/24/22 0746 External 145 -- -- -- -- --      Patient Vitals for the past 4 hrs: Mode Fetal Heart Rate Variability Decelerations Accelerations RN Reviewed Strip? Non Stress Test   04/24/22 0815 External 140 6-25 BPM None Yes Yes Reactive   04/24/22 0746 External 145 -- -- -- -- --        Uterine Activity: None     Exam:  Patient without distress. Abdomen, fundus soft non-tender     Extremities, no redness or tenderness               Additional Exam: Deferred    Labs:     Lab Results   Component Value Date/Time    WBC 10.2 04/17/2022 09:25 PM    WBC 12.0 (H) 11/07/2019 06:33 PM    WBC 7.0 07/09/2018 09:25 PM    WBC 11.9 (H) 03/01/2017 05:13 PM    WBC 5.5 07/28/2014 09:52 AM    HGB 12.2 04/17/2022 09:25 PM    HGB 12.6 11/07/2019 06:33 PM    HGB 13.8 07/09/2018 09:25 PM    HGB 13.7 03/01/2017 05:13 PM    HGB 13.4 07/28/2014 09:52 AM    HCT 35.6 04/17/2022 09:25 PM    HCT 35.9 11/07/2019 06:33 PM    HCT 41.4 07/09/2018 09:25 PM    HCT 39.4 03/01/2017 05:13 PM    HCT 41.3 07/28/2014 09:52 AM    PLATELET 358 60/34/7901 09:25 PM    PLATELET 570 88/58/3177 06:33 PM    PLATELET 221 37/09/7101 09:25 PM    PLATELET 197 32/69/7890 05:13 PM    PLATELET 802 45/72/0657 09:52 AM       No results found for this or any previous visit (from the past 24 hour(s)).

## 2022-04-24 NOTE — ROUTINE PROCESS
Bedside/verbal shift change report given to ROSAMARIA Barber RN (oncoming nurse) by Polo Murphy RN (offgoing nurse). Report included the following information SBAR, Procedure Summary, Intake/Output, MAR and Recent Results.

## 2022-04-24 NOTE — PROGRESS NOTES
High Risk Obstetrics Progress Note    Name: Katelyn Gold MRN: [id number]  SSN: [id number]    YOB: 1995  Age: 32 y.o. Sex: female      Subjective:      LOS: 7 days    Estimated Date of Delivery: 22   Gestational Age Today: 29w3d     Patient admitted for complete previa at 29 weeks. States she has developed left lower back pain over the last couple of hours, also has had more bright red spotting today. She denies cramping, contractions, NVFSC. The baby has been active. .    Objective:     Vitals:  Blood pressure 135/81, pulse (!) 104, temperature 97.6 °F (36.4 °C), resp. rate 16, height 5' 2\" (1.575 m), weight 78.5 kg (173 lb), last menstrual period 06/15/2021, SpO2 99 %, currently breastfeeding. Temp (24hrs), Av.9 °F (36.6 °C), Min:97.6 °F (36.4 °C), Max:98.3 °F (54.8 °C)    Systolic (23AEM), QDB:843 , Min:116 , AJE:562      Diastolic (84OJV), LVE:33, Min:59, Max:81     Examination:  General:  In NAD  HEENT: normocephalic  Neck: supple  Back; No CVAT  Skin: warm, dry, no abnormal lesions noted  Abdomen: gravid, soft, nontender, no abnormal masses, rebound, rigidity, guarding  Extremities: No abnormal cyanosis, clubbing, edema noted  Screening neurological exam: within normal limits      Bedside ultrasound:  Moss IUP, vertex presentation; subjective normal fluid; fetal activity and FHT noted. Placenta previa extends from approx midpoint of anterior fundus across cervix to posterior aspect of endometrial cavity. Area which appears CW connection between anterior and posterior lobes of the placenta is noted, just anterior to the internal os. Assessment and Plan: Active Problems:    Complete placenta previa nos or without hemorrhage, third trimester (2022)       29 week IUP, complete previa, bilobed placenta.   Patient has had more bright red spotting, suspect back ache is musculoskeletal.  Will update Type and Screen, CBC, IV access  Discussed with patient; questions answered.     Signed By: Patrick Segundo MD     April 24, 2022

## 2022-04-24 NOTE — PROGRESS NOTES
Dr. Constantino Dior at the bedside to assess pt. Ultrasound done. Orders received to draw labs and start new IV's. Will inform primary nurse.

## 2022-04-25 PROCEDURE — 74011000250 HC RX REV CODE- 250: Performed by: OBSTETRICS & GYNECOLOGY

## 2022-04-25 PROCEDURE — 74011250637 HC RX REV CODE- 250/637: Performed by: OBSTETRICS & GYNECOLOGY

## 2022-04-25 PROCEDURE — 65410000002 HC RM PRIVATE OB

## 2022-04-25 RX ADMIN — SODIUM CHLORIDE, PRESERVATIVE FREE 10 ML: 5 INJECTION INTRAVENOUS at 12:24

## 2022-04-25 RX ADMIN — ACETAMINOPHEN 1000 MG: 500 TABLET ORAL at 08:06

## 2022-04-25 RX ADMIN — SODIUM CHLORIDE, PRESERVATIVE FREE 10 ML: 5 INJECTION INTRAVENOUS at 22:52

## 2022-04-25 RX ADMIN — SODIUM CHLORIDE, PRESERVATIVE FREE 10 ML: 5 INJECTION INTRAVENOUS at 12:25

## 2022-04-25 NOTE — ROUTINE PROCESS
Bedside and Verbal shift change report given to NIGEL Edmond (oncoming nurse) by NIGEL Silverman RN (offgoing nurse). Report included the following information SBAR, Kardex, Intake/Output and MAR.

## 2022-04-25 NOTE — PROGRESS NOTES
Ante Partum Progress Note    Altagracia Norman  29w5d    Assessment: 34w10d   33 yo  (two prior vaginal deliveries)  with vaginal bleeding in the setting of placenta previa (bilobed placenta with possibility of developing vasa previa.  elevated bps on admission  - Presented with bright red bleeding evening of - red bleeding stopped . Brown bleeding stopped  and on  began with bright red bleeding. It is not heavy but it more frequent. - no contractions on toco, feeling some upper back/flank pain that is more c/w msk pain  - US on presentation breech, repeat scan by [facility] sonographer - cephalic, efw 32%. DEMARCO 17.  --anterior and posterior placenta, would go through during      Plan:    - bleeding previa: Continue hospitalization with hospitalized bedrest. Continue TID monitoring. Has two large bore IVs. Inpatient management until no bleeding x at least 48 hours. If considering dc would run this by Dr JASON PURVIS placental abnormality. Rh pos. - prematurity: Completed bmz course --. S/p 48hrs mag. Sp hema consult  - noted to have some borderline elevated BPs and had normal labs- all recent BPs have been normal   --will deliver for heavy bleeding  --keep two IVs and up to date T&S.    --repeat ultrasound today          Orders/Charges: High    Patient states she has no new complaints. Bleeding is consistent from yesterday. Small amounts of streaking with wiping but no active bleeding. Vitals:  Visit Vitals  /70 (BP 1 Location: Right upper arm, BP Patient Position: Sitting)   Pulse (!) 104   Temp 98.1 °F (36.7 °C)   Resp 16   Ht 5' 2\" (1.575 m)   Wt 78.5 kg (173 lb)   LMP 06/15/2021   SpO2 97%   BMI 31.64 kg/m²     Temp (24hrs), Av °F (36.7 °C), Min:97.6 °F (36.4 °C), Max:98.4 °F (36.9 °C)      Last 24hr Input/Output:  No intake or output data in the 24 hours ending 22 0938     Non stress test:  Reactive    Patient Vitals for the past 4 hrs:    Mode Fetal Heart Rate Fetal Activity   04/25/22 0935 External 146 Present      Patient Vitals for the past 4 hrs: Mode Fetal Heart Rate Fetal Activity   04/25/22 0935 External 146 Present        Uterine Activity: None     Exam:  Patient without distress. Abdomen, fundus soft non-tender     Extremities, no redness or tenderness               Additional Exam: Deferred    Labs:     Lab Results   Component Value Date/Time    WBC 11.5 (H) 04/24/2022 06:24 PM    WBC 10.2 04/17/2022 09:25 PM    WBC 12.0 (H) 11/07/2019 06:33 PM    WBC 7.0 07/09/2018 09:25 PM    WBC 11.9 (H) 03/01/2017 05:13 PM    WBC 5.5 07/28/2014 09:52 AM    HGB 12.0 04/24/2022 06:24 PM    HGB 12.2 04/17/2022 09:25 PM    HGB 12.6 11/07/2019 06:33 PM    HGB 13.8 07/09/2018 09:25 PM    HGB 13.7 03/01/2017 05:13 PM    HGB 13.4 07/28/2014 09:52 AM    HCT 35.7 04/24/2022 06:24 PM    HCT 35.6 04/17/2022 09:25 PM    HCT 35.9 11/07/2019 06:33 PM    HCT 41.4 07/09/2018 09:25 PM    HCT 39.4 03/01/2017 05:13 PM    HCT 41.3 07/28/2014 09:52 AM    PLATELET 523 97/88/9806 06:24 PM    PLATELET 266 36/60/2556 09:25 PM    PLATELET 139 67/83/4025 06:33 PM    PLATELET 863 50/95/9031 09:25 PM    PLATELET 396 42/97/8362 05:13 PM    PLATELET 655 02/92/8711 09:52 AM       Recent Results (from the past 24 hour(s))   CBC WITH AUTOMATED DIFF    Collection Time: 04/24/22  6:24 PM   Result Value Ref Range    WBC 11.5 (H) 3.6 - 11.0 K/uL    RBC 3.85 3.80 - 5.20 M/uL    HGB 12.0 11.5 - 16.0 g/dL    HCT 35.7 35.0 - 47.0 %    MCV 92.7 80.0 - 99.0 FL    MCH 31.2 26.0 - 34.0 PG    MCHC 33.6 30.0 - 36.5 g/dL    RDW 13.8 11.5 - 14.5 %    PLATELET 253 441 - 283 K/uL    MPV 10.3 8.9 - 12.9 FL    NRBC 0.0 0  WBC    ABSOLUTE NRBC 0.00 0.00 - 0.01 K/uL    NEUTROPHILS 74 32 - 75 %    BAND NEUTROPHILS 1 %    LYMPHOCYTES 15 12 - 49 %    MONOCYTES 5 5 - 13 %    EOSINOPHILS 3 0 - 7 %    BASOPHILS 0 0 - 1 %    METAMYELOCYTES 2 %    IMMATURE GRANULOCYTES 0 0.0 - 0.5 %    ABS.  NEUTROPHILS 8.6 (H) 1.8 - 8.0 K/UL    ABS. LYMPHOCYTES 1.7 0.8 - 3.5 K/UL    ABS. MONOCYTES 0.6 0.0 - 1.0 K/UL    ABS. EOSINOPHILS 0.3 0.0 - 0.4 K/UL    ABS. BASOPHILS 0.0 0.0 - 0.1 K/UL    ABS. IMM.  GRANS. 0.0 0.00 - 0.04 K/UL    DF MANUAL      RBC COMMENTS NORMOCYTIC, NORMOCHROMIC      WBC COMMENTS TOXIC GRANULATION     TYPE & SCREEN    Collection Time: 04/24/22  6:24 PM   Result Value Ref Range    Crossmatch Expiration 04/27/2022,2359     ABO/Rh(D) Sruthi Risk POSITIVE     Antibody screen NEG

## 2022-04-26 PROCEDURE — 65410000002 HC RM PRIVATE OB

## 2022-04-26 PROCEDURE — 74011000250 HC RX REV CODE- 250: Performed by: OBSTETRICS & GYNECOLOGY

## 2022-04-26 RX ADMIN — SODIUM CHLORIDE, PRESERVATIVE FREE 10 ML: 5 INJECTION INTRAVENOUS at 06:14

## 2022-04-26 NOTE — PROGRESS NOTES
Problem: Falls - Risk of  Goal: *Absence of Falls  Description: Document Daniatoby Holden Fall Risk and appropriate interventions in the flowsheet.   Outcome: Progressing Towards Goal  Note: Fall Risk Interventions:  Mobility Interventions: Communicate number of staff needed for ambulation/transfer         Medication Interventions: Teach patient to arise slowly,Patient to call before getting OOB    Elimination Interventions: Call light in reach

## 2022-04-26 NOTE — ROUTINE PROCESS
Bedside and Verbal shift change report given to NIGEL Silverman RN  (oncoming nurse) by NIGEL Wade (offgoing nurse). Report included the following information SBAR, Kardex, Procedure Summary, Intake/Output, MAR and Recent Results.

## 2022-04-27 PROCEDURE — 65410000002 HC RM PRIVATE OB

## 2022-04-27 PROCEDURE — 74011000250 HC RX REV CODE- 250: Performed by: OBSTETRICS & GYNECOLOGY

## 2022-04-27 RX ADMIN — SODIUM CHLORIDE, PRESERVATIVE FREE 10 ML: 5 INJECTION INTRAVENOUS at 00:09

## 2022-04-27 RX ADMIN — SODIUM CHLORIDE, PRESERVATIVE FREE 10 ML: 5 INJECTION INTRAVENOUS at 16:15

## 2022-04-27 RX ADMIN — SODIUM CHLORIDE, PRESERVATIVE FREE 10 ML: 5 INJECTION INTRAVENOUS at 16:16

## 2022-04-27 NOTE — PROGRESS NOTES
Bedside and Verbal shift change report given to SARITA Kaiser (oncoming nurse) by FAZAL Arriaga and NIGEL Silverman (offgoing nurse). Report included the following information SBAR, Kardex and MAR.

## 2022-04-27 NOTE — PROGRESS NOTES
Ante Partum Progress Note    Que Bryan  29w6d    Assessment: 33w8d   33 yo  (two prior vaginal deliveries)  with vaginal bleeding in the setting of placenta previa (bilobed placenta with possibility of developing vasa previa. - Presented with bright red bleeding evening of - red bleeding stopped . Brown bleeding stopped  and on  began with bright red bleeding again. It is not heavy but it more frequent. - no contractions on toco, feeling some upper back/flank pain that is more c/w msk pain  - US  - cephalic, efw 04%. DEMARCO 17. US - transverse, BPP , DEMARCO=17.  --anterior and posterior placenta, would go through during       Plan:    - bleeding previa: Continue hospitalization with hospitalized bedrest. Continue TID monitoring. Has two large bore IVs. Inpatient management until no bleeding x at least 48 hours. If considering dc would run this by Dr SHERYL BAIN [facility] placental abnormality. Rh pos. - prematurity: Completed bmz course --. S/p 48hrs mag. Sp hema consult  - noted to have some borderline elevated BPs and had normal labs- all recent BPs have been normal   --will deliver for heavy bleeding  --keep two IVs and up to date T&S. Orders/Charges: High    Patient states she does not have headache , abdominal pain  , contractions, right upper quadrant pain  , swelling, vaginal leaking of fluid  and reports active FM. She has noted red bleeding with wiping today. No blood on her pad. Vitals:  Visit Vitals  /75 (BP 1 Location: Right upper arm, BP Patient Position: Sitting)   Pulse 94   Temp 98 °F (36.7 °C)   Resp 16   Ht 5' 2\" (1.575 m)   Wt 78.5 kg (173 lb)   LMP 06/15/2021   SpO2 99%   BMI 31.64 kg/m²     Temp (24hrs), Av °F (36.7 °C), Min:97.9 °F (36.6 °C), Max:98.1 °F (36.7 °C)      Last 24hr Input/Output:  No intake or output data in the 24 hours ending 22 0902     Non stress test:  Reactive    Patient Vitals for the past 4 hrs:    Mode Fetal Heart Rate Fetal Activity   04/27/22 0836 External 153 Present      Patient Vitals for the past 4 hrs: Mode Fetal Heart Rate Fetal Activity   04/27/22 0836 External 153 Present        Uterine Activity: None     Exam:  Patient without distress. Abdomen, fundus soft non-tender     Extremities, no redness or tenderness               Additional Exam: Deferred    Labs:     Lab Results   Component Value Date/Time    WBC 11.5 (H) 04/24/2022 06:24 PM    WBC 10.2 04/17/2022 09:25 PM    WBC 12.0 (H) 11/07/2019 06:33 PM    WBC 7.0 07/09/2018 09:25 PM    WBC 11.9 (H) 03/01/2017 05:13 PM    WBC 5.5 07/28/2014 09:52 AM    HGB 12.0 04/24/2022 06:24 PM    HGB 12.2 04/17/2022 09:25 PM    HGB 12.6 11/07/2019 06:33 PM    HGB 13.8 07/09/2018 09:25 PM    HGB 13.7 03/01/2017 05:13 PM    HGB 13.4 07/28/2014 09:52 AM    HCT 35.7 04/24/2022 06:24 PM    HCT 35.6 04/17/2022 09:25 PM    HCT 35.9 11/07/2019 06:33 PM    HCT 41.4 07/09/2018 09:25 PM    HCT 39.4 03/01/2017 05:13 PM    HCT 41.3 07/28/2014 09:52 AM    PLATELET 538 10/18/4973 06:24 PM    PLATELET 758 47/43/0068 09:25 PM    PLATELET 557 07/90/0732 06:33 PM    PLATELET 306 19/12/3485 09:25 PM    PLATELET 045 93/14/1283 05:13 PM    PLATELET 547 79/35/5966 09:52 AM       No results found for this or any previous visit (from the past 24 hour(s)).

## 2022-04-27 NOTE — PROGRESS NOTES
1303: On assessment, pt observed pink bleeding this morning while voiding. Only on toilet tissue after wiping, this is a change from brown. Notified MD, no new orders. Will CTM.

## 2022-04-28 PROCEDURE — 74011000250 HC RX REV CODE- 250: Performed by: OBSTETRICS & GYNECOLOGY

## 2022-04-28 PROCEDURE — 36415 COLL VENOUS BLD VENIPUNCTURE: CPT

## 2022-04-28 PROCEDURE — 65410000002 HC RM PRIVATE OB

## 2022-04-28 RX ADMIN — SODIUM CHLORIDE, PRESERVATIVE FREE 10 ML: 5 INJECTION INTRAVENOUS at 16:19

## 2022-04-28 RX ADMIN — SODIUM CHLORIDE, PRESERVATIVE FREE 10 ML: 5 INJECTION INTRAVENOUS at 08:30

## 2022-04-28 NOTE — PROGRESS NOTES
Ante Partum Progress Note    Carolina Vargas  30w0d     Assessment: 30w0d   31 yo  (two prior vaginal deliveries)  with vaginal bleeding in the setting of placenta previa (bilobed placenta with possibility of developing vasa previa. - Presented with bright red bleeding evening of - red bleeding stopped . Brown bleeding stopped  and on  began with bright red bleeding again. It is dark brown today. - no contractions on toco, feeling some upper back/flank pain that is more c/w msk pain  - US  - cephalic, efw 92%. DEMARCO 17. US - transverse, BPP 8/, DEMARCO=17.  --anterior and posterior placenta, would go through during       Plan:    - bleeding previa: Continue hospitalization with hospitalized bedrest. Continue TID monitoring. Has two large bore IVs. Inpatient management until no bleeding x at least 48 hours. If considering dc would run this by Dr VIJAY PORTILLO [facility] placental abnormality. Rh pos. - prematurity: Completed bmz course --. S/p 48hrs mag. Sp hema consult  - noted to have some borderline elevated BPs and had normal labs- all recent BPs have been normal   --will deliver for heavy bleeding, or at 36wks per M  --keep two IVs and up to date T&S. Orders/Charges: High    Patient states she does not have headache , abdominal pain  , contractions, right upper quadrant pain  , swelling, vaginal leaking of fluid  and reports active FM. She has noted dark brown bleeding with wiping today. No blood on her pad. Vitals:  Visit Vitals  /67 (BP 1 Location: Right upper arm, BP Patient Position: Sitting)   Pulse 96   Temp 98.3 °F (36.8 °C)   Resp 16   Ht 5' 2\" (1.575 m)   Wt 78.5 kg (173 lb)   LMP 06/15/2021   SpO2 100%   BMI 31.64 kg/m²     Temp (24hrs), Av °F (36.7 °C), Min:97.8 °F (36.6 °C), Max:98.3 °F (36.8 °C)      Last 24hr Input/Output:  No intake or output data in the 24 hours ending 22 1125     Non stress test:  Reactive    No data found.    No data found.     Uterine Activity: None     Exam:  Patient without distress. Abdomen, fundus soft non-tender     Extremities, no redness or tenderness               Additional Exam: Deferred    Labs:     Lab Results   Component Value Date/Time    WBC 11.5 (H) 04/24/2022 06:24 PM    WBC 10.2 04/17/2022 09:25 PM    WBC 12.0 (H) 11/07/2019 06:33 PM    WBC 7.0 07/09/2018 09:25 PM    WBC 11.9 (H) 03/01/2017 05:13 PM    WBC 5.5 07/28/2014 09:52 AM    HGB 12.0 04/24/2022 06:24 PM    HGB 12.2 04/17/2022 09:25 PM    HGB 12.6 11/07/2019 06:33 PM    HGB 13.8 07/09/2018 09:25 PM    HGB 13.7 03/01/2017 05:13 PM    HGB 13.4 07/28/2014 09:52 AM    HCT 35.7 04/24/2022 06:24 PM    HCT 35.6 04/17/2022 09:25 PM    HCT 35.9 11/07/2019 06:33 PM    HCT 41.4 07/09/2018 09:25 PM    HCT 39.4 03/01/2017 05:13 PM    HCT 41.3 07/28/2014 09:52 AM    PLATELET 303 11/43/6419 06:24 PM    PLATELET 605 96/97/1309 09:25 PM    PLATELET 548 10/51/9765 06:33 PM    PLATELET 629 74/99/1460 09:25 PM    PLATELET 409 64/15/6334 05:13 PM    PLATELET 909 22/53/0364 09:52 AM       No results found for this or any previous visit (from the past 24 hour(s)).

## 2022-04-28 NOTE — ROUTINE PROCESS
Bedside shift change report given to LAYA Adam (oncoming nurse) by Hiram Geronimo. Adeola Muro (offgoing nurse). Report included the following information SBAR, Intake/Output and MAR.

## 2022-04-29 PROCEDURE — 65410000002 HC RM PRIVATE OB

## 2022-04-29 NOTE — PROGRESS NOTES
Ante Partum Progress Note    Payton Turcios  30w0d     Assessment: 30w1d   33 yo  (two prior vaginal deliveries)  with vaginal bleeding in the setting of placenta previa (bilobed placenta)  - Presented with bright red bleeding evening of - red bleeding stopped . Brown bleeding stopped  and on  began with bright red bleeding again but minimal. Since then just \"muddy brown\", lessening.   - no contractions on toco, feeling some upper back/flank pain that is more c/w msk pain  - US  - cephalic, efw 92%. DEMARCO 17. US - transverse, BPP 8, DEMARCO=17.  --anterior and posterior placenta, would go through during       Plan:    - bleeding previa: Continue hospitalization with hospitalized bedrest. Continue TID monitoring. Has two large bore IVs. Inpatient management until no bleeding x at least 48 hours. Rh pos. - prematurity: Completed bmz course --. S/p 48hrs mag. Sp hema consult  - noted to have some borderline elevated BPs and had normal labs- all recent BPs have been normal   --will deliver for heavy bleeding, or at 36wks per MFM. In terms of discharge, she lives within 20 minutes of hospital and states she can have an adult with her at home at all times in case of need to return to hospital. Reviewed risks and benefits and decided together that if no new bleeding over the next 24 hours will plan dc home tomorrow. Reviewed if bleed recurred at home would then need readmission for remainder of pregnancy. Reviewed delivery for heavy bleed, or any significant bleeding after 34 weeks. --keep two IVs and up to date T&S. Orders/Charges: High    Patient states she does not have headache , abdominal pain  , contractions, right upper quadrant pain  , swelling, vaginal leaking of fluid  and reports active FM. Yesterday morning had brown discharge then lightened through the day then nearly nothing by bedtime, then brown again this am. No contractions.      Vitals:  Visit Vitals  /66 (BP 1 Location: Right arm, BP Patient Position: Sitting)   Pulse 95   Temp 97.9 °F (36.6 °C)   Resp 18   Ht 5' 2\" (1.575 m)   Wt 78.5 kg (173 lb)   LMP 06/15/2021   SpO2 98%   BMI 31.64 kg/m²     Temp (24hrs), Av °F (36.7 °C), Min:97.9 °F (36.6 °C), Max:98 °F (36.7 °C)      Last 24hr Input/Output:  No intake or output data in the 24 hours ending 22 0911     Non stress test:  Reactive    No data found. No data found. Uterine Activity: None     Exam:  Patient without distress. Abdomen, fundus soft non-tender     Extremities, no redness or tenderness               Additional Exam: Deferred    Labs:     Lab Results   Component Value Date/Time    WBC 11.5 (H) 2022 06:24 PM    WBC 10.2 2022 09:25 PM    WBC 12.0 (H) 2019 06:33 PM    WBC 7.0 2018 09:25 PM    WBC 11.9 (H) 2017 05:13 PM    WBC 5.5 2014 09:52 AM    HGB 12.0 2022 06:24 PM    HGB 12.2 2022 09:25 PM    HGB 12.6 2019 06:33 PM    HGB 13.8 2018 09:25 PM    HGB 13.7 2017 05:13 PM    HGB 13.4 2014 09:52 AM    HCT 35.7 2022 06:24 PM    HCT 35.6 2022 09:25 PM    HCT 35.9 2019 06:33 PM    HCT 41.4 2018 09:25 PM    HCT 39.4 2017 05:13 PM    HCT 41.3 2014 09:52 AM    PLATELET 160  06:24 PM    PLATELET 743  09:25 PM    PLATELET 439  06:33 PM    PLATELET 272  09:25 PM    PLATELET 941  05:13 PM    PLATELET 884  09:52 AM       No results found for this or any previous visit (from the past 24 hour(s)).

## 2022-04-29 NOTE — ROUTINE PROCESS
1010 spoke with Dr. Rishabh Hadry regarding patient strip, will keep on monitor x20 more minutes per MD request and then reevauate    [phone] spoke with Dr. Rishabh Hardy and patient removed from monitor    [phone] spoke with Dr. Rishabh Hardy about patient leaving floor to [facility] to spend with  and other children, patient allowed to leave unit with family member and to call this RN if any emergency, patient given [phone] as emergency contact    [phone] patient off of floor    1913 Bedside and Verbal shift change report given to SARITA Kaiser RN (oncoming nurse) by Jessica Bloom RN (offgoing nurse). Report included the following information SBAR.

## 2022-04-30 LAB
ABO + RH BLD: NORMAL
BLOOD GROUP ANTIBODIES SERPL: NORMAL
SPECIMEN EXP DATE BLD: NORMAL

## 2022-04-30 PROCEDURE — 36415 COLL VENOUS BLD VENIPUNCTURE: CPT

## 2022-04-30 PROCEDURE — 74011000250 HC RX REV CODE- 250: Performed by: OBSTETRICS & GYNECOLOGY

## 2022-04-30 PROCEDURE — 86900 BLOOD TYPING SEROLOGIC ABO: CPT

## 2022-04-30 PROCEDURE — 65410000002 HC RM PRIVATE OB

## 2022-04-30 RX ADMIN — SODIUM CHLORIDE, PRESERVATIVE FREE 10 ML: 5 INJECTION INTRAVENOUS at 04:02

## 2022-04-30 RX ADMIN — SODIUM CHLORIDE, PRESERVATIVE FREE 10 ML: 5 INJECTION INTRAVENOUS at 16:01

## 2022-04-30 RX ADMIN — SODIUM CHLORIDE, PRESERVATIVE FREE 10 ML: 5 INJECTION INTRAVENOUS at 08:38

## 2022-04-30 NOTE — ROUTINE PROCESS
Bedside and Verbal shift change report given to SARITA Kaiser RN (oncoming nurse) by Amandeep Morillo RN (offgoing nurse). Report included the following information SBAR.

## 2022-04-30 NOTE — ROUTINE PROCESS
Bedside and Verbal shift change report given to FAZAL Neff RN (oncoming nurse) by Mohit Morales RN (offgoing nurse). Report included the following information SBAR, Intake/Output and MAR.

## 2022-04-30 NOTE — PROGRESS NOTES
Ante Partum Progress Note    Naima Koch  30w2d     Assessment: 30w2d   31 yo  (two prior vaginal deliveries)  with vaginal bleeding in the setting of placenta previa (bilobed placenta)  - Presented with bright red bleeding evening of - red bleeding stopped . Brown bleeding stopped  and on  began with bright red bleeding again but minimal. Since then just \"muddy brown\", but seems more every morning  - no contractions on toco on her TID NSTs  - US  - cephalic, efw 38%. DEMARCO 17. US - transverse, BPP , DEMARCO=17.  --anterior and posterior placenta, would go through during       Plan:    - bleeding previa: Continue hospitalization with hospitalized bedrest. Continue TID monitoring. Has two large bore IVs. Inpatient management until no bleeding x at least 48 hours. Rh pos. - prematurity: Completed bmz course --. S/p 48hrs mag. Sp hema consult  - noted to have some borderline elevated BPs and had normal labs- all recent BPs have been normal   --will deliver for heavy bleeding, or at 36wks per MFM. In terms of discharge, she lives within 20 minutes of hospital and states she can have an adult with her at home at all times in case of need to return to hospital. Reviewed delivery for heavy bleed, or any significant bleeding after 34 weeks. Rediscussed management of home vs inpatient monitoring. Given she seems to keep having more brown discharge, rather than it leveling off, we reviewed that its possible there is a small amount of new bleeding and that we should wait for discharge until that is lessening or gone and she agrees. --keep two IVs and up to date T&S (last T&S today )    Orders/Charges: High    Patient states she does not have headache , abdominal pain  , contractions, right upper quadrant pain  , swelling, vaginal leaking of fluid  and reports active FM.  Yesterday morning had brown discharge then lightened through the day then nearly nothing by bedtime, then brown again this am, same thing where its sort of heavy. No contractions. Vitals:  Visit Vitals  /71 (BP 1 Location: Right upper arm, BP Patient Position: At rest)   Pulse (!) 102   Temp 98.4 °F (36.9 °C)   Resp 16   Ht 5' 2\" (1.575 m)   Wt 78.5 kg (173 lb)   LMP 06/15/2021   SpO2 98%   BMI 31.64 kg/m²     Temp (24hrs), Av.2 °F (36.8 °C), Min:98.1 °F (36.7 °C), Max:98.4 °F (36.9 °C)      Last 24hr Input/Output:  No intake or output data in the 24 hours ending 22 1310     Non stress test:  Reactive    No data found. No data found. Uterine Activity: None     Exam:  Patient without distress. Abdomen, fundus soft non-tender     Extremities, no redness or tenderness               Additional Exam: Deferred    Labs:     Lab Results   Component Value Date/Time    WBC 11.5 (H) 2022 06:24 PM    WBC 10.2 2022 09:25 PM    WBC 12.0 (H) 2019 06:33 PM    WBC 7.0 2018 09:25 PM    WBC 11.9 (H) 2017 05:13 PM    WBC 5.5 2014 09:52 AM    HGB 12.0 2022 06:24 PM    HGB 12.2 2022 09:25 PM    HGB 12.6 2019 06:33 PM    HGB 13.8 2018 09:25 PM    HGB 13.7 2017 05:13 PM    HGB 13.4 2014 09:52 AM    HCT 35.7 2022 06:24 PM    HCT 35.6 2022 09:25 PM    HCT 35.9 2019 06:33 PM    HCT 41.4 2018 09:25 PM    HCT 39.4 2017 05:13 PM    HCT 41.3 2014 09:52 AM    PLATELET 328  06:24 PM    PLATELET 029  09:25 PM    PLATELET 280  06:33 PM    PLATELET 428  09:25 PM    PLATELET 551  05:13 PM    PLATELET 776  09:52 AM       No results found for this or any previous visit (from the past 24 hour(s)).

## 2022-05-01 PROCEDURE — 65410000002 HC RM PRIVATE OB

## 2022-05-01 NOTE — ROUTINE PROCESS
Bedside and Verbal shift change report given to SARITA Kaiser RN (oncoming nurse) by Dede Vera RN (offgoing nurse). Report included the following information SBAR.

## 2022-05-01 NOTE — PROGRESS NOTES
Ante Partum Progress Note    Paredes Service  30w3d     Assessment: 30w3d   33 yo  (two prior vaginal deliveries)  with vaginal bleeding in the setting of placenta previa (bilobed placenta)  - Presented with bright red bleeding evening of - red bleeding stopped . Brown bleeding stopped  and on  began with bright red bleeding again but minimal. Since then just \"muddy brown\", but seems more every morning. Am of  to am of  NO discharge or bleeding.  - no contractions on toco on her TID NSTs  - US  - cephalic, efw 56%. DEMARCO 17. US - transverse, BPP , DEMARCO=17.  --anterior and posterior placenta, would go through during       Plan:    - bleeding previa: Continue hospitalization with hospitalized bedrest. Continue TID monitoring. Has two large bore IVs. Inpatient management until no bleeding x at least 48 hours. Rh pos. - prematurity: Completed bmz course --. S/p 48hrs mag. Sp hema consult  - noted to have some borderline elevated BPs and had normal labs- all recent BPs have been normal   --will deliver for heavy bleeding, or at 36wks per MFM. In terms of discharge, she lives within 20 minutes of hospital and states she can have an adult with her at home at all times in case of need to return to hospital. Reviewed delivery for heavy bleed, or any significant bleeding after 34 weeks. Rediscussed management of home vs inpatient monitoring. She has now had no bleeding/brown discharge x 24 hours. Had a contraction on NST this am. If no bleeding/discharge today and no significant contractions throughout the day we talked about dc home tomorrow. --keep two IVs and up to date T&S (last T&S )    Orders/Charges: High    Patient states she does not have headache , abdominal pain  , contractions, right upper quadrant pain  , swelling, vaginal leaking of fluid  and reports active FM. No dc/bleeding all day yesterday.  Did feel some tightness (not pain) with contraction this am.    Vitals:  Visit Vitals  /68 (BP 1 Location: Right upper arm, BP Patient Position: At rest)   Pulse 99   Temp 97.8 °F (36.6 °C)   Resp 16   Ht 5' 2\" (1.575 m)   Wt 78.5 kg (173 lb)   LMP 06/15/2021   SpO2 99%   BMI 31.64 kg/m²     Temp (24hrs), Av.9 °F (36.6 °C), Min:97.7 °F (36.5 °C), Max:98.2 °F (36.8 °C)      Last 24hr Input/Output:  No intake or output data in the 24 hours ending 22 1145     Non stress test:  Reactive    Patient Vitals for the past 4 hrs: Mode Fetal Heart Rate Fetal Activity Variability Accelerations RN Reviewed Strip? Non Stress Test   22 0846 External 140 Present 6-25 BPM Yes Yes Reactive   22 0820 External 140 -- -- -- -- --      Patient Vitals for the past 4 hrs: Mode Fetal Heart Rate Fetal Activity Variability Accelerations RN Reviewed Strip? Non Stress Test   22 0846 External 140 Present 6-25 BPM Yes Yes Reactive   22 0820 External 140 -- -- -- -- --        Uterine Activity: None     Exam:  Patient without distress.      Abdomen, fundus soft non-tender     Extremities, no redness or tenderness               Additional Exam: Deferred    Labs:     Lab Results   Component Value Date/Time    WBC 11.5 (H) 2022 06:24 PM    WBC 10.2 2022 09:25 PM    WBC 12.0 (H) 2019 06:33 PM    WBC 7.0 2018 09:25 PM    WBC 11.9 (H) 2017 05:13 PM    WBC 5.5 2014 09:52 AM    HGB 12.0 2022 06:24 PM    HGB 12.2 2022 09:25 PM    HGB 12.6 2019 06:33 PM    HGB 13.8 2018 09:25 PM    HGB 13.7 2017 05:13 PM    HGB 13.4 2014 09:52 AM    HCT 35.7 2022 06:24 PM    HCT 35.6 2022 09:25 PM    HCT 35.9 2019 06:33 PM    HCT 41.4 2018 09:25 PM    HCT 39.4 2017 05:13 PM    HCT 41.3 2014 09:52 AM    PLATELET 171  06:24 PM    PLATELET 601 9820 09:25 PM    PLATELET 977  06:33 PM    PLATELET 950  09:25 PM    PLATELET 864  05:13 PM PLATELET 704 96/94/2751 09:52 AM       Recent Results (from the past 24 hour(s))   TYPE & SCREEN    Collection Time: 04/30/22  9:57 PM   Result Value Ref Range    Crossmatch Expiration 05/03/2022,9298     ABO/Rh(D) Olga Marus POSITIVE     Antibody screen NEG

## 2022-05-01 NOTE — ROUTINE PROCESS
Bedside and Verbal shift change report given to FAZAL Rubio RN (oncoming nurse) by Mohit Morales RN (offgoing nurse). Report included the following information SBAR, Procedure Summary, Intake/Output and MAR.

## 2022-05-02 VITALS
OXYGEN SATURATION: 99 % | DIASTOLIC BLOOD PRESSURE: 66 MMHG | BODY MASS INDEX: 31.83 KG/M2 | TEMPERATURE: 98 F | HEART RATE: 102 BPM | HEIGHT: 62 IN | RESPIRATION RATE: 16 BRPM | SYSTOLIC BLOOD PRESSURE: 116 MMHG | WEIGHT: 173 LBS

## 2022-05-02 PROCEDURE — 74011000250 HC RX REV CODE- 250: Performed by: OBSTETRICS & GYNECOLOGY

## 2022-05-02 RX ADMIN — SODIUM CHLORIDE, PRESERVATIVE FREE 5 ML: 5 INJECTION INTRAVENOUS at 07:53

## 2022-05-02 RX ADMIN — SODIUM CHLORIDE, PRESERVATIVE FREE 10 ML: 5 INJECTION INTRAVENOUS at 00:52

## 2022-05-02 NOTE — PROGRESS NOTES
Ante Partum Progress Note    Cinthia Ra  [id number]     Assessment: 30w4d   31 yo  (two prior vaginal deliveries)  with vaginal bleeding in the setting of placenta previa (bilobed placenta)  - Presented with bright red bleeding evening of - red bleeding stopped . Brown bleeding stopped  and on  began with bright red bleeding again but minimal. Since then just \"muddy brown\", but seems more every morning. Am of  to am of  NO discharge or bleeding.  - no contractions on toco on her TID NSTs  - US  - cephalic, efw 39%. DEMARCO 17. US - transverse, BPP , DEMARCO=17.  --anterior and posterior placenta, would go through during       Plan:    - bleeding previa: Has two large bore IVs. Inpatient management until no bleeding x at least 48 hours--today, stable for discharge. Rh pos. - prematurity: Completed bmz course --. S/p 48hrs mag. Sp hema consult  - noted to have some borderline elevated BPs and had normal labs- all recent BPs have been normal   --will deliver for heavy bleeding, or at 36wks per MFM. In terms of discharge, she lives within 20 minutes of hospital and states she can have an adult with her at home at all times in case of need to return to hospital. Reviewed delivery for heavy bleed, or any significant bleeding after 34 weeks. Rediscussed management of home vs inpatient monitoring. She has now had no bleeding/brown discharge x 48 hours. --keep two IVs and up to date T&S (last T&S )    Orders/Charges: High    Patient states she does not have headache , abdominal pain  , contractions, right upper quadrant pain  , swelling, vaginal leaking of fluid  and reports active FM. No dc/bleeding all day yesterday.    Vitals:  Visit Vitals  /66 (BP 1 Location: Right arm, BP Patient Position: At rest)   Pulse (!) 102   Temp 98 °F (36.7 °C)   Resp 16   Ht 5' 2\" (1.575 m)   Wt 78.5 kg (173 lb)   LMP 06/15/2021   SpO2 99%   BMI 31.64 kg/m²     Temp (24hrs), Av.1 °F (36.7 °C), Min:97.9 °F (36.6 °C), Max:98.2 °F (36.8 °C)      Last 24hr Input/Output:  No intake or output data in the 24 hours ending 05/02/22 1144     Non stress test:  Reactive    Patient Vitals for the past 4 hrs: Mode Fetal Heart Rate Fetal Activity Variability Decelerations Accelerations RN Reviewed Strip? Non Stress Test   05/02/22 0815 External 140 Present 6-25 BPM Variable Yes Yes Reactive   05/02/22 0753 External 135 Present -- -- -- -- --      Patient Vitals for the past 4 hrs: Mode Fetal Heart Rate Fetal Activity Variability Decelerations Accelerations RN Reviewed Strip? Non Stress Test   05/02/22 0815 External 140 Present 6-25 BPM Variable Yes Yes Reactive   05/02/22 0753 External 135 Present -- -- -- -- --        Uterine Activity: None     Exam:  Patient without distress. Abdomen, fundus soft non-tender     Extremities, no redness or tenderness               Additional Exam: Deferred    Labs:       No results found for this or any previous visit (from the past 24 hour(s)).

## 2022-05-02 NOTE — ROUTINE PROCESS
Bedside and Verbal shift change report given to ROSAMARIA Freeman RN (oncoming nurse) by Mohit Morales RN (offgoing nurse). Report included the following information SBAR, Intake/Output and MAR.

## 2022-05-02 NOTE — DISCHARGE INSTRUCTIONS
Patient Education        Weeks 34 to 39 of Your Pregnancy: Care Instructions  Overview     By now, your baby and your belly have grown quite large. It's almost time to give birth! Your baby's lungs are almost ready to breathe air. The skull bones are firm enough to protect your baby's head, but soft enough to move down through the birth canal.  You may be feeling excited and happy at times--but also anxious or scared. You might wonder how you'll know if you're in labor or what to expect during labor. Try to be open and flexible in your expectations of the birth. Because each birth is different, there's no way to know exactly what childbirth will be like for you. Talk to your doctor or midwife about any concerns you have. If you haven't already had the Tdap shot during this pregnancy, talk to your doctor about getting it. It will help protect your  against pertussis infection. In the 36th week, you'll probably have a test for group B streptococcus (GBS). GBS is a common type of bacteria that can live in the vagina and rectum. It can make your baby sick after birth. If you test positive, you will get antibiotics during labor. The medicine will help keep your baby from getting the bacteria. Follow-up care is a key part of your treatment and safety. Be sure to make and go to all appointments, and call your doctor if you are having problems. It's also a good idea to know your test results and keep a list of the medicines you take. How can you care for yourself at home? Learn about pain relief choices  · Pain is different for everyone. Talk with your doctor about your feelings about pain. · You can choose from several types of pain relief. These include medicine, breathing techniques, and comfort measures. You can use more than one option. · If you choose to have pain medicine during labor, talk to your doctor about your options. Some medicines lower anxiety and help with some of the pain.  Others make your lower body numb so that you won't feel pain. · Be sure to tell your doctor about your pain medicine choice before you start labor or very early in your labor. You may be able to change your mind as labor progresses. Labor and delivery  · The first stage of labor has three parts: early, active, and transition. ? It's common to have early labor at home. You can stay busy or rest, eat light snacks, drink clear fluids, and start counting contractions. ? When talking during a contraction gets hard, you may be moving to active labor. During active labor, you should head for the hospital if you aren't there already. ? You are in active labor when contractions come every 3 to 4 minutes and last about 60 seconds. Your cervix is opening more rapidly. ? If your water breaks, contractions will come faster and stronger. ? During transition, your cervix is stretching, and contractions are coming more rapidly. ? You may want to push, but your cervix might not be ready. Your doctor will tell you when to push. · The second stage starts when your cervix is completely opened and you are ready to push. ? Contractions are very strong to push the baby down the birth canal.  ? You will probably feel the urge to push. You may feel like you need to have a bowel movement. ? You may be coached to push with contractions. These contractions will be very strong, but you won't have them as often. You can get a little rest between contractions. ? One last push, and your baby is born. · The third stage is when a few more contractions push out the placenta. This may take 30 minutes or less. Where can you learn more? Go to http://www.gray.com/  Enter B912 in the search box to learn more about \"Weeks 34 to 36 of Your Pregnancy: Care Instructions. \"  Current as of: June 16, 2021               Content Version: 13.2  © 1988-8665 [employer], [employer].    Care instructions adapted under license by [facility] (which disclaims liability or warranty for this information). If you have questions about a medical condition or this instruction, always ask your healthcare professional. [address] any warranty or liability for your use of this information. Patient Education        Placenta Previa: Care Instructions  Your Care Instructions     The placenta forms during pregnancy. It gives the baby nutrients and oxygen. It also removes waste products. Normally, the placenta attaches to the inner wall of the uterus, away from the opening of the uterus. Sometimes the placenta attaches so low that it blocks part of the opening. This is called placenta previa. Bed rest has not been shown to help prevent problems in most women with placenta previa. But your doctor may recommend that you limit your activities. Your doctor will watch you closely until your baby can be safely delivered. This can be a scary time. Most of the time a  delivery is done. Follow-up care is a key part of your treatment and safety. Be sure to make and go to all appointments, and call your doctor if you are having problems. It's also a good idea to know your test results and keep a list of the medicines you take. How can you care for yourself at home? · Do not do any heavy activity. Do not run or lift anything that weighs more than 20 pounds. · Have a phone nearby at all times. If you start to bleed, you will need to call your doctor right away. · Tell all doctors and nurses who examine you that you must not have pelvic exams because you have placenta previa. · Ask your doctor if you can have sex. Many doctors recommend that women with placenta previa not have intercourse after 28 weeks of pregnancy. · Do not put anything, such as tampons or douches, into your vagina. Use pads if you are bleeding, and call your doctor. When should you call for help? Call 911 anytime you think you may need emergency care. For example, call if:    · You passed out (lost consciousness).     · You have severe vaginal bleeding. Call your doctor now or seek immediate medical care if:    · You have any vaginal bleeding.     · You have pain in your belly or pelvis.     · You think that you are in labor.     · You have a sudden release of fluid from your vagina.     · You notice that your baby has stopped moving or is moving much less than normal.   Watch closely for changes in your health, and be sure to contact your doctor if you have any questions or concerns. Where can you learn more? Go to http://www.gray.com/  Enter A070 in the search box to learn more about \"Placenta Previa: Care Instructions. \"  Current as of: June 16, 2021               Content Version: 13.2  © 2006-2022 [employer]. Care instructions adapted under license by [employer] (which disclaims liability or warranty for this information). If you have questions about a medical condition or this instruction, always ask your healthcare professional. [address] any warranty or liability for your use of this information.

## 2022-05-02 NOTE — DISCHARGE SUMMARY
Antepartum  Discharge Summary     Patient ID:  Tavo Em  [id number]  15 y.o.  1995    Admit date: 4/17/2022    Discharge date: 5/2/2022    Admission Diagnoses:    Patient Active Problem List   Diagnosis Code    PROM (premature rupture of membranes) O42.90    Term pregnancy Z34.90    Complete placenta previa nos or without hemorrhage, third trimester O44.03       Discharge Diagnoses: There are no discharge diagnoses documented for the most recent discharge. Patient Active Problem List   Diagnosis Code    PROM (premature rupture of membranes) O42.90    Term pregnancy Z34.90    Complete placenta previa nos or without hemorrhage, third trimester O44.03       Procedures for this admission: None    Hospital Course: Patient admitted for bleeding in the setting of complete previa. Monitored until 48 hours no bleeding. Received BMZ and NP Mag while inpatient. Disposition: home    Discharged Condition: stable            Patient Instructions:   Current Discharge Medication List      CONTINUE these medications which have NOT CHANGED    Details   aspirin delayed-release 81 mg tablet Take  by mouth daily. magnesium 250 mg tab Take  by mouth. PNV no.24-iron-folic acid-dha (PRENATAL DHA+COMPLETE PRENATAL) -300 mg-mcg-mg cmpk Take  by mouth.          STOP taking these medications       ibuprofen (MOTRIN) 800 mg tablet Comments:   Reason for Stopping:             Activity: Ambulate in house and pelvic rest  Diet: Regular Diet    Follow-up with   Follow-up Appointments   Procedures    FOLLOW UP VISIT Appointment in: 3 - 5 Days     Standing Status:   Standing     Number of Occurrences:   1     Order Specific Question:   Appointment in     Answer:   3 - 5 Days        Signed:  Lamar Arenas MD  5/2/2022  11:48 AM

## 2022-05-16 ENCOUNTER — HOSPITAL ENCOUNTER (INPATIENT)
Age: 27
LOS: 11 days | Discharge: HOME | End: 2022-05-27
Attending: OBSTETRICS & GYNECOLOGY | Admitting: OBSTETRICS & GYNECOLOGY
Payer: COMMERCIAL

## 2022-05-16 PROBLEM — O44.03 PLACENTA PREVIA ANTEPARTUM IN THIRD TRIMESTER: Status: ACTIVE | Noted: 2022-05-16

## 2022-05-16 PROBLEM — O46.93 VAGINAL BLEEDING IN PREGNANCY, THIRD TRIMESTER: Status: ACTIVE | Noted: 2022-05-16

## 2022-05-16 LAB
ERYTHROCYTE [DISTWIDTH] IN BLOOD BY AUTOMATED COUNT: 13.8 % (ref 11.5–14.5)
HCT VFR BLD AUTO: 34.6 % (ref 35–47)
HGB BLD-MCNC: 11.9 G/DL (ref 11.5–16)
MCH RBC QN AUTO: 31.6 PG (ref 26–34)
MCHC RBC AUTO-ENTMCNC: 34.4 G/DL (ref 30–36.5)
MCV RBC AUTO: 91.8 FL (ref 80–99)
NRBC # BLD: 0 K/UL (ref 0–0.01)
NRBC BLD-RTO: 0 PER 100 WBC
PLATELET # BLD AUTO: 166 K/UL (ref 150–400)
PMV BLD AUTO: 10.6 FL (ref 8.9–12.9)
RBC # BLD AUTO: 3.77 M/UL (ref 3.8–5.2)
WBC # BLD AUTO: 9.9 K/UL (ref 3.6–11)

## 2022-05-16 PROCEDURE — G0378 HOSPITAL OBSERVATION PER HR: HCPCS

## 2022-05-16 PROCEDURE — 86900 BLOOD TYPING SEROLOGIC ABO: CPT

## 2022-05-16 PROCEDURE — 4A1HXCZ MONITORING OF PRODUCTS OF CONCEPTION, CARDIAC RATE, EXTERNAL APPROACH: ICD-10-PCS | Performed by: OBSTETRICS & GYNECOLOGY

## 2022-05-16 PROCEDURE — 65410000002 HC RM PRIVATE OB

## 2022-05-16 PROCEDURE — 85027 COMPLETE CBC AUTOMATED: CPT

## 2022-05-16 RX ORDER — SODIUM CHLORIDE 0.9 % (FLUSH) 0.9 %
5-40 SYRINGE (ML) INJECTION EVERY 8 HOURS
Status: DISCONTINUED | OUTPATIENT
Start: 2022-05-16 | End: 2022-05-24 | Stop reason: SDUPTHER

## 2022-05-16 RX ORDER — DIPHENHYDRAMINE HCL 25 MG
25 CAPSULE ORAL
Status: DISCONTINUED | OUTPATIENT
Start: 2022-05-16 | End: 2022-05-24 | Stop reason: SDUPTHER

## 2022-05-16 RX ORDER — SODIUM CHLORIDE 0.9 % (FLUSH) 0.9 %
5-40 SYRINGE (ML) INJECTION AS NEEDED
Status: DISCONTINUED | OUTPATIENT
Start: 2022-05-16 | End: 2022-05-24 | Stop reason: SDUPTHER

## 2022-05-16 RX ORDER — ACETAMINOPHEN 325 MG/1
650 TABLET ORAL
Status: DISCONTINUED | OUTPATIENT
Start: 2022-05-16 | End: 2022-05-24 | Stop reason: SDUPTHER

## 2022-05-17 PROCEDURE — G0378 HOSPITAL OBSERVATION PER HR: HCPCS

## 2022-05-17 PROCEDURE — 65410000002 HC RM PRIVATE OB

## 2022-05-17 PROCEDURE — 74011000250 HC RX REV CODE- 250: Performed by: OBSTETRICS & GYNECOLOGY

## 2022-05-17 RX ADMIN — SODIUM CHLORIDE, PRESERVATIVE FREE 10 ML: 5 INJECTION INTRAVENOUS at 08:24

## 2022-05-17 RX ADMIN — SODIUM CHLORIDE, PRESERVATIVE FREE 10 ML: 5 INJECTION INTRAVENOUS at 16:11

## 2022-05-17 NOTE — PROGRESS NOTES
Patient states used restroom and no blood was on pad. States there was a very small amount of BRB on tissue when wiped.

## 2022-05-17 NOTE — ROUTINE PROCESS
Bedside and Verbal shift change report given to FAZAL Leonardo RN  (oncoming nurse) by HUBER Jackson RN & ROSAMARIA Max RN (offgoing nurse). Report included the following information SBAR, Procedure Summary, Intake/Output, MAR, Recent Results and Med Rec Status.

## 2022-05-17 NOTE — PROGRESS NOTES
Ante Partum Progress Note    Tan Bryson  32w5d    Assessment: 32w5d   33yo  (two prior vaginal deliveries) with vaginal bleeding in the setting of placenta previa (bilobed placenta)   This is her second admission for bleeding with previa (first admission -)   --anterior and posterior placenta, would go through during    - US : EFW 89%ile, DEMARCO 17.5, continued bilobed placenta with thinning of the tissue traversing the os but can see placental tissue crossing between the lobes. Plan:    - Bleeding previa: continue hospitalization with hospitalized bedrest. Continue TID monitoring. Has two large bore IVs. Rh pos.   - Discussed with MFM : given this is patient's second admission will plan for her to stay hospitalized until delivery. Deliver for heavy bleeding or at 36 weeks. Discussed with patient and she is aware. - Completed BMZ course -  - TID NSTs  - keep two IVs and up to date T&S (last T&S )     Orders/Charges: High    Patient states bleeding is now only with wiping. Has good fetal movement. No contractions. No headache or abdominal pain. No LOF. No blood on pad this morning. Vitals:  Visit Vitals  /71 (BP 1 Location: Right upper arm, BP Patient Position: At rest)   Pulse 92   Temp 98.4 °F (36.9 °C)   Resp 18   Ht 5' 2\" (1.575 m)   Wt 80.7 kg (178 lb)   LMP 06/15/2021   SpO2 98%   BMI 32.56 kg/m²     Temp (24hrs), Av.3 °F (36.8 °C), Min:98.1 °F (36.7 °C), Max:98.4 °F (36.9 °C)      Last 24hr Input/Output:  No intake or output data in the 24 hours ending 22 1122     Non stress test:  Cat I tracing     Patient Vitals for the past 4 hrs: Mode Fetal Heart Rate Fetal Activity Variability Decelerations Accelerations RN Reviewed Strip? Non Stress Test   22 0857 External 140 Present 6-25 BPM None Yes Yes Reactive   22 0837 External 140            Patient Vitals for the past 4 hrs:    Mode Fetal Heart Rate Fetal Activity Variability Decelerations Accelerations RN Reviewed Strip? Non Stress Test   05/17/22 0857 External 140 Present 6-25 BPM None Yes Yes Reactive   05/17/22 0837 External 140              Uterine Activity: None     Exam:  Patient without distress.      Abdomen, fundus soft non-tender     Extremities, no redness or tenderness               Additional Exam: Deferred    Labs:     Lab Results   Component Value Date/Time    WBC 9.9 05/16/2022 09:51 PM    WBC 11.5 (H) 04/24/2022 06:24 PM    WBC 10.2 04/17/2022 09:25 PM    WBC 12.0 (H) 11/07/2019 06:33 PM    WBC 7.0 07/09/2018 09:25 PM    WBC 11.9 (H) 03/01/2017 05:13 PM    WBC 5.5 07/28/2014 09:52 AM    HGB 11.9 05/16/2022 09:51 PM    HGB 12.0 04/24/2022 06:24 PM    HGB 12.2 04/17/2022 09:25 PM    HGB 12.6 11/07/2019 06:33 PM    HGB 13.8 07/09/2018 09:25 PM    HGB 13.7 03/01/2017 05:13 PM    HGB 13.4 07/28/2014 09:52 AM    HCT 34.6 (L) 05/16/2022 09:51 PM    HCT 35.7 04/24/2022 06:24 PM    HCT 35.6 04/17/2022 09:25 PM    HCT 35.9 11/07/2019 06:33 PM    HCT 41.4 07/09/2018 09:25 PM    HCT 39.4 03/01/2017 05:13 PM    HCT 41.3 07/28/2014 09:52 AM    PLATELET 689 35/81/3297 09:51 PM    PLATELET 910 01/85/0642 06:24 PM    PLATELET 648 15/80/1708 09:25 PM    PLATELET 084 16/88/8014 06:33 PM    PLATELET 071 82/96/3199 09:25 PM    PLATELET 462 42/23/2713 05:13 PM    PLATELET 853 52/52/1742 09:52 AM       Recent Results (from the past 24 hour(s))   CBC W/O DIFF    Collection Time: 05/16/22  9:51 PM   Result Value Ref Range    WBC 9.9 3.6 - 11.0 K/uL    RBC 3.77 (L) 3.80 - 5.20 M/uL    HGB 11.9 11.5 - 16.0 g/dL    HCT 34.6 (L) 35.0 - 47.0 %    MCV 91.8 80.0 - 99.0 FL    MCH 31.6 26.0 - 34.0 PG    MCHC 34.4 30.0 - 36.5 g/dL    RDW 13.8 11.5 - 14.5 %    PLATELET 508 359 - 337 K/uL    MPV 10.6 8.9 - 12.9 FL    NRBC 0.0 0  WBC    ABSOLUTE NRBC 0.00 0.00 - 0.01 K/uL   TYPE & SCREEN    Collection Time: 05/16/22  9:51 PM   Result Value Ref Range    Crossmatch Expiration 05/19/2022,2035 ABO/Rh(D) O POSITIVE     Antibody screen NEG

## 2022-05-17 NOTE — H&P
OB History & Physical    Name: Delicia Bonilla MRN: [id number]  SSN: [id number]    YOB: 1995  Age: 32 y.o. Sex: female      Subjective:     Chief complaint: vaginal bleeding    History of Present Illness: Delicia Bonilla is a 32 y.o.  female with an estimated gestational age of 28w1d with Estimated Date of Delivery: 22. Patient complains of vaginal bleeding that started again tonight. She has history of placenta previa and recent hospital admission, receiving betamethasone. She states she was getting ready for bed and went to bathroom and felt the blood trickling out. She states it felt more than before. The bleeding has continued but not increased. She has not had any increase in activity. Denies any abdominal pain or contractions. Anticipated plan is to deliver at 36 wks. OB History        4    Para   2    Term   2            AB   1    Living   2       SAB   1    IAB        Ectopic        Molar        Multiple   0    Live Births   2              Past Medical History:   Diagnosis Date    Adverse effect of anesthesia     MIGRAINE    Breast disorder     6 yrs ago lumpectomy for fibroadnoma    Essential hypertension     Gestational HTN    Gestational hypertension     Nausea & vomiting      Past Surgical History:   Procedure Laterality Date    HX CYST INCISION AND DRAINAGE      HX WISDOM TEETH EXTRACTION      SC BREAST SURGERY PROCEDURE UNLISTED Right     lumpectomy for fibroadnoma6 years ago     Social History     Occupational History    Not on file   Tobacco Use    Smoking status: Never Smoker    Smokeless tobacco: Never Used   Substance and Sexual Activity    Alcohol use: No    Drug use: No    Sexual activity: Yes     Family History   Problem Relation Age of Onset    Hypertension Maternal Grandmother     Diabetes Maternal Grandmother        No Known Allergies  Prior to Admission medications    Medication Sig Start Date End Date Taking?  Authorizing Provider aspirin delayed-release 81 mg tablet Take  by mouth daily. Yes Provider, Historical   magnesium 250 mg tab Take  by mouth. Yes Provider, Historical   PNV no.24-iron-folic acid-dha (PRENATAL DHA+COMPLETE PRENATAL) -300 mg-mcg-mg cmpk Take  by mouth. Yes Provider, Historical        Review of Systems    Objective:     Vitals:    Vitals:    05/16/22 2107   Weight: 80.7 kg (178 lb)   Height: 5' 2\" (1.575 m)      No data recorded. No data recorded     Physical Exam  General: in NAD  HEENT: normocephalic    Abdomen: Gravid, soft, nontender, no abnormal masses, rebound, rigidity, guarding  Fundus: soft, nontender    Pelvic:Cervical Exam: SSE: 1 scolpette's worth of bright red blood cleared from vault. Cervix visually closed, thick. Pad- scant amount of pink blood    Uterine Activity: no contractions detected  Membranes: no gross evidence of ROM  Fetal Heart Rate: 120's adequate variability and reactivity; no significant abnormal decelerations    Labs: No results found for this or any previous visit (from the past 24 hour(s)). Patient Active Problem List   Diagnosis Code    PROM (premature rupture of membranes) O42.90    Term pregnancy Z34.90    Complete placenta previa nos or without hemorrhage, third trimester O44.03    Vaginal bleeding in pregnancy, third trimester O46.93    Placenta previa antepartum in third trimester O44.03     Assessment and Plan:      33 y/o [id number] @ 32w4d with vaginal bleeding, known placenta previa    1. IUP- category 1    2. Placenta previa, current bleeding- minimal blood on exam, but report heavier bleeding at home. No reported contraction subjectively or on the monitor. Cervix appears unlabored on speculum exam. Will observe overnight, if no additional changes, would anticipate discharge tomorrow. She has received betamethasone at her prior admission. She will do pad counts overnight. Will send CBC, type and screen and have IV site in place.      3. Gestational hypertension- non severe, continue to monitor    Signed By:  Aguila Spears MD     May 16, 2022

## 2022-05-17 NOTE — PROGRESS NOTES
Pt presented to L&D stating she has a complete previa and had started bleeding tonight at 2020. Pt states positive fetal movement, no loss of fluid and positive for bleeding. Pt states she doesn't have a headache, epigastric pain or visual disturbances. Pt was here previously for 2 weeks for bleeding as well. Pt on monitor. 2130 - Dr. Marlin Estrella at bedside, speculum exam completed. Pt admitted for observation. Pad checks to be done frequently. NST to done qshift. 2145 - Report given to LAYA Cook

## 2022-05-17 NOTE — ROUTINE PROCESS
Bedside and Verbal shift change report given to ROSAMARIA Max RN & HUBER Titus RN (oncoming nurse) by LAYA Sommer RN (offgoing nurse). Report included the following information SBAR, Procedure Summary, Intake/Output, MAR and Recent Results.

## 2022-05-18 PROBLEM — N93.9 VAGINAL BLEEDING: Status: ACTIVE | Noted: 2022-05-18

## 2022-05-18 PROCEDURE — 65410000002 HC RM PRIVATE OB

## 2022-05-18 PROCEDURE — 74011000250 HC RX REV CODE- 250: Performed by: OBSTETRICS & GYNECOLOGY

## 2022-05-18 RX ADMIN — SODIUM CHLORIDE, PRESERVATIVE FREE 10 ML: 5 INJECTION INTRAVENOUS at 16:25

## 2022-05-18 RX ADMIN — SODIUM CHLORIDE, PRESERVATIVE FREE 5 ML: 5 INJECTION INTRAVENOUS at 21:00

## 2022-05-18 RX ADMIN — SODIUM CHLORIDE, PRESERVATIVE FREE 10 ML: 5 INJECTION INTRAVENOUS at 08:33

## 2022-05-18 NOTE — PROGRESS NOTES
Ante Partum Progress Note    Eric Coleman  32w6d    Assessment: 32w6d   33yo  (two prior vaginal deliveries) with vaginal bleeding in the setting of placenta previa (bilobed placenta)   This is her second admission for bleeding with previa (first admission -)   --anterior and posterior placenta, would go through during    - US : EFW 89%ile, DEMARCO 17.5, continued bilobed placenta with thinning of the tissue traversing the os but can see placental tissue crossing between the lobes. - BPP today    Plan:    - Bleeding previa: continue hospitalization with hospitalized bedrest. Continue TID monitoring. Has two large bore IVs. Rh pos.   - Discussed with MFM : given this is patient's second admission will plan for her to stay hospitalized until delivery. Deliver for heavy bleeding or at 36 weeks. Discussed with patient and she is aware. - Completed BMZ course -  - TID NSTs  - keep two IVs and up to date T&S (last T&S )     Orders/Charges: High    Patient states bleeding is now just brown. Has good fetal movement. No contractions. No headache or abdominal pain. No LOF. Vitals:  Visit Vitals  BP (!) 109/55 (BP 1 Location: Left upper arm, BP Patient Position: At rest)   Pulse 97   Temp 98.2 °F (36.8 °C)   Resp 16   Ht 5' 2\" (1.575 m)   Wt 80.7 kg (178 lb)   LMP 06/15/2021   SpO2 98%   BMI 32.56 kg/m²     Temp (24hrs), Av.3 °F (36.8 °C), Min:98.1 °F (36.7 °C), Max:98.6 °F (37 °C)      Last 24hr Input/Output:  No intake or output data in the 24 hours ending 22 0746     Non stress test:  Cat I tracing     No data found. No data found. Uterine Activity: None     Exam:  Patient without distress.      Abdomen, fundus soft non-tender     Extremities, no redness or tenderness               Additional Exam: Deferred    Labs:     Lab Results   Component Value Date/Time    WBC 9.9 2022 09:51 PM    WBC 11.5 (H) 2022 06:24 PM    WBC 10.2 2022 09:25 PM    WBC 12.0 (H) 11/07/2019 06:33 PM    WBC 7.0 07/09/2018 09:25 PM    WBC 11.9 (H) 03/01/2017 05:13 PM    WBC 5.5 07/28/2014 09:52 AM    HGB 11.9 05/16/2022 09:51 PM    HGB 12.0 04/24/2022 06:24 PM    HGB 12.2 04/17/2022 09:25 PM    HGB 12.6 11/07/2019 06:33 PM    HGB 13.8 07/09/2018 09:25 PM    HGB 13.7 03/01/2017 05:13 PM    HGB 13.4 07/28/2014 09:52 AM    HCT 34.6 (L) 05/16/2022 09:51 PM    HCT 35.7 04/24/2022 06:24 PM    HCT 35.6 04/17/2022 09:25 PM    HCT 35.9 11/07/2019 06:33 PM    HCT 41.4 07/09/2018 09:25 PM    HCT 39.4 03/01/2017 05:13 PM    HCT 41.3 07/28/2014 09:52 AM    PLATELET 133 18/05/9862 09:51 PM    PLATELET 097 85/54/7215 06:24 PM    PLATELET 472 54/60/0846 09:25 PM    PLATELET 833 65/30/2763 06:33 PM    PLATELET 675 14/77/4857 09:25 PM    PLATELET 124 70/14/6299 05:13 PM    PLATELET 939 46/73/4031 09:52 AM       No results found for this or any previous visit (from the past 24 hour(s)).

## 2022-05-18 NOTE — ROUTINE PROCESS
Bedside and Verbal shift change report given to HUBER Silverman RN (oncoming nurse) by Calvert Barthel RN (offgoing nurse). Report included the following information SBAR, Intake/Output, MAR and Recent Results.

## 2022-05-18 NOTE — ROUTINE PROCESS
Bedside and Verbal shift change report given to ROSAMARIA Ricci RN (oncoming nurse) by HUBER Silverman RN (offgoing nurse). Report included the following information SBAR, Intake/Output, MAR and Recent Results.

## 2022-05-19 LAB
ABO + RH BLD: NORMAL
ABO + RH BLD: NORMAL
BLOOD GROUP ANTIBODIES SERPL: NORMAL
BLOOD GROUP ANTIBODIES SERPL: NORMAL
SPECIMEN EXP DATE BLD: NORMAL
SPECIMEN EXP DATE BLD: NORMAL

## 2022-05-19 PROCEDURE — 74011000250 HC RX REV CODE- 250: Performed by: OBSTETRICS & GYNECOLOGY

## 2022-05-19 PROCEDURE — 86900 BLOOD TYPING SEROLOGIC ABO: CPT

## 2022-05-19 PROCEDURE — 59025 FETAL NON-STRESS TEST: CPT

## 2022-05-19 PROCEDURE — 36415 COLL VENOUS BLD VENIPUNCTURE: CPT

## 2022-05-19 PROCEDURE — 65410000002 HC RM PRIVATE OB

## 2022-05-19 RX ADMIN — SODIUM CHLORIDE, PRESERVATIVE FREE 10 ML: 5 INJECTION INTRAVENOUS at 23:13

## 2022-05-19 RX ADMIN — SODIUM CHLORIDE, PRESERVATIVE FREE 5 ML: 5 INJECTION INTRAVENOUS at 04:00

## 2022-05-19 NOTE — PROGRESS NOTES
Ante Partum Progress Note    Nahum Capone  33w0d    Assessment: 33w0d   31yo  (two prior vaginal deliveries) with vaginal bleeding in the setting of placenta previa (bilobed placenta)   This is her second admission for bleeding with previa (first admission -)   --anterior and posterior placenta, would go through during    - US : EFW 89%ile, DEMARCO 17.5, continued bilobed placenta with thinning of the tissue traversing the os but can see placental tissue crossing between the lobes. - BPP today    Plan:    - Bleeding previa: continue hospitalization with hospitalized bedrest. Continue TID monitoring. Has two large bore IVs. Rh pos.   - Discussed with MFM : given this is patient's second admission will plan for her to stay hospitalized until delivery. Deliver for heavy bleeding or at 36 weeks. Discussed with patient and she is aware. - Completed BMZ course -  - TID NSTs  - keep two IVs and up to date T&S (last T&S )     Orders/Charges: High    Patient states bleeding is now just brown. Has good fetal movement. No contractions. No headache or abdominal pain. No LOF. Vitals:  Visit Vitals  BP (!) 118/58 (BP 1 Location: Left upper arm, BP Patient Position: At rest;Sitting)   Pulse 98   Temp 98.7 °F (37.1 °C)   Resp 16   Ht 5' 2\" (1.575 m)   Wt 80.7 kg (178 lb)   LMP 06/15/2021   SpO2 98%   BMI 32.56 kg/m²     Temp (24hrs), Av.3 °F (36.8 °C), Min:97.8 °F (36.6 °C), Max:98.7 °F (37.1 °C)      Last 24hr Input/Output:  No intake or output data in the 24 hours ending 22 0948     Non stress test:  Cat I tracing     Baseline 120  +Accels  No decels  Mod variability    Uterine Activity: None     Exam:  Patient without distress.      Abdomen, fundus soft non-tender     Extremities, no redness or tenderness               Additional Exam: Deferred    Labs:     Lab Results   Component Value Date/Time    WBC 9.9 2022 09:51 PM    WBC 11.5 (H) 2022 06:24 PM    WBC 10.2 04/17/2022 09:25 PM    WBC 12.0 (H) 11/07/2019 06:33 PM    WBC 7.0 07/09/2018 09:25 PM    WBC 11.9 (H) 03/01/2017 05:13 PM    WBC 5.5 07/28/2014 09:52 AM    HGB 11.9 05/16/2022 09:51 PM    HGB 12.0 04/24/2022 06:24 PM    HGB 12.2 04/17/2022 09:25 PM    HGB 12.6 11/07/2019 06:33 PM    HGB 13.8 07/09/2018 09:25 PM    HGB 13.7 03/01/2017 05:13 PM    HGB 13.4 07/28/2014 09:52 AM    HCT 34.6 (L) 05/16/2022 09:51 PM    HCT 35.7 04/24/2022 06:24 PM    HCT 35.6 04/17/2022 09:25 PM    HCT 35.9 11/07/2019 06:33 PM    HCT 41.4 07/09/2018 09:25 PM    HCT 39.4 03/01/2017 05:13 PM    HCT 41.3 07/28/2014 09:52 AM    PLATELET 815 46/46/8235 09:51 PM    PLATELET 792 13/11/2050 06:24 PM    PLATELET 542 69/33/0862 09:25 PM    PLATELET 322 60/87/7297 06:33 PM    PLATELET 904 58/25/3999 09:25 PM    PLATELET 075 62/10/0710 05:13 PM    PLATELET 493 34/24/7601 09:52 AM       Recent Results (from the past 24 hour(s))   TYPE & SCREEN    Collection Time: 05/19/22  3:59 AM   Result Value Ref Range    Crossmatch Expiration 05/22/2022,2359     ABO/Rh(D) Nilay You POSITIVE     Antibody screen NEG      Janette Pond MD

## 2022-05-19 NOTE — ROUTINE PROCESS
Bedside and Verbal shift change report given to HUBER Basilio [facility] RN (oncoming nurse) by ROSAMARIA Ricci RN (offgoing nurse). Report included the following information SBAR, Intake/Output, MAR and Recent Results.

## 2022-05-19 NOTE — ROUTINE PROCESS
Bedside and Verbal shift change report given to NIGEL Zurita (oncoming nurse) by Rg Cabrera RN (offgoing nurse). Report included the following information SBAR, Procedure Summary, Intake/Output and MAR.

## 2022-05-20 PROCEDURE — 74011000250 HC RX REV CODE- 250: Performed by: OBSTETRICS & GYNECOLOGY

## 2022-05-20 PROCEDURE — 65410000002 HC RM PRIVATE OB

## 2022-05-20 RX ADMIN — SODIUM CHLORIDE, PRESERVATIVE FREE 10 ML: 5 INJECTION INTRAVENOUS at 09:49

## 2022-05-20 NOTE — PROGRESS NOTES
Ante Partum Progress Note    Alexi Hancock  33w1d     Assessment: 33w1d   31yo  (two prior vaginal deliveries) with vaginal bleeding in the setting of placenta previa (bilobed placenta), bleeding now resolved. This is her second admission for bleeding with previa (first admission -)   --anterior and posterior placenta, would go through during    - US : EFW 89%ile, DEMARCO 17.5, continued bilobed placenta with thinning of the tissue traversing the os but can see placental tissue crossing between the lobes. - Breech presentation  - weekly BPP (next )    Plan:    - Bleeding previa: continue hospitalization with hospitalized bedrest. Continue TID monitoring. Has two large bore IVs. Rh pos.   - Discussed with MFM : given this is patient's second admission will plan for her to stay hospitalized until delivery. Deliver for heavy bleeding or at 36 weeks. Discussed with patient and she is aware. - Completed BMZ course -  - TID NSTs  - keep two IVs and up to date T&S (last T&S )     Orders/Charges: High    Patient states brown spotting  Has basically resolved. Has good fetal movement. No contractions. No headache or abdominal pain. No LOF. Vitals:  Visit Vitals  BP (!) 110/57 (BP 1 Location: Left upper arm, BP Patient Position: At rest)   Pulse 95   Temp 98.5 °F (36.9 °C)   Resp 16   Ht 5' 2\" (1.575 m)   Wt 80.7 kg (178 lb)   LMP 06/15/2021   SpO2 98%   BMI 32.56 kg/m²     Temp (24hrs), Av.5 °F (36.9 °C), Min:98.4 °F (36.9 °C), Max:98.6 °F (37 °C)      Last 24hr Input/Output:  No intake or output data in the 24 hours ending 22 1321     Non stress test:  Cat I tracing     Baseline 120  +Accels  No decels  Mod variability    Uterine Activity: None     Exam:  Patient without distress.      Abdomen, fundus soft non-tender     Extremities, no redness or tenderness               Additional Exam: Deferred    Labs:     Lab Results   Component Value Date/Time    WBC 9.9 05/16/2022 09:51 PM    WBC 11.5 (H) 04/24/2022 06:24 PM    WBC 10.2 04/17/2022 09:25 PM    WBC 12.0 (H) 11/07/2019 06:33 PM    WBC 7.0 07/09/2018 09:25 PM    WBC 11.9 (H) 03/01/2017 05:13 PM    WBC 5.5 07/28/2014 09:52 AM    HGB 11.9 05/16/2022 09:51 PM    HGB 12.0 04/24/2022 06:24 PM    HGB 12.2 04/17/2022 09:25 PM    HGB 12.6 11/07/2019 06:33 PM    HGB 13.8 07/09/2018 09:25 PM    HGB 13.7 03/01/2017 05:13 PM    HGB 13.4 07/28/2014 09:52 AM    HCT 34.6 (L) 05/16/2022 09:51 PM    HCT 35.7 04/24/2022 06:24 PM    HCT 35.6 04/17/2022 09:25 PM    HCT 35.9 11/07/2019 06:33 PM    HCT 41.4 07/09/2018 09:25 PM    HCT 39.4 03/01/2017 05:13 PM    HCT 41.3 07/28/2014 09:52 AM    PLATELET 001 96/04/8874 09:51 PM    PLATELET 563 99/86/8722 06:24 PM    PLATELET 019 51/71/5343 09:25 PM    PLATELET 202 12/75/3440 06:33 PM    PLATELET 820 19/65/5963 09:25 PM    PLATELET 802 36/86/9231 05:13 PM    PLATELET 870 12/21/5263 09:52 AM       No results found for this or any previous visit (from the past 24 hour(s)).   Kymberly Lawrence MD

## 2022-05-20 NOTE — ROUTINE PROCESS
Bedside and Verbal shift change report given to SARITA Ortiz  (oncoming nurse) by NIGEL Maher (offgoing nurse). Report included the following information SBAR, Kardex, Procedure Summary, Intake/Output, MAR and Recent Results.

## 2022-05-20 NOTE — PROGRESS NOTES
Progress Note  Called to room by nurse for report of episode of vaginal bleeding. Patient seen and evaluated, she was in bed on the NST. Denies contractions or cramping, abdominal pain. Felt a little bit of blood coming out so she went to the bathroom, saw some red blood with wiping, but it was not a large amount. I evaluated her pad (had been on at least 5-10 minutes), and there is a very small amount of red blood. NST reviewed, acrontractile, reactive and AGA. Maternal/fetal status is reassuring.  Will continue to monitor closely     Kt Le MD  5/20/2022  4:04 PM

## 2022-05-20 NOTE — PROGRESS NOTES
1550 - Pt up to restroom before starting NST. Pt notified this RN that she had some new bright red bleeding upon wiping. Pt assisted back to bed and placed on the monitor. 1555 - Dr. Chun Irvin @ bedside to evaluate pt. Scant amount of red bleeding noted on pt's pad.    1630  - NST complete and reactive. 26 - Per Dr. Chun Irvin OK to take pt off monitor.

## 2022-05-21 PROCEDURE — 74011000250 HC RX REV CODE- 250: Performed by: OBSTETRICS & GYNECOLOGY

## 2022-05-21 PROCEDURE — 65410000002 HC RM PRIVATE OB

## 2022-05-21 RX ADMIN — SODIUM CHLORIDE, PRESERVATIVE FREE 10 ML: 5 INJECTION INTRAVENOUS at 20:55

## 2022-05-21 RX ADMIN — SODIUM CHLORIDE, PRESERVATIVE FREE 10 ML: 5 INJECTION INTRAVENOUS at 09:08

## 2022-05-21 NOTE — PROGRESS NOTES
Ante Partum Progress Note    Tavo Em  33w2d     Assessment: 33w2d   31yo  (two prior vaginal deliveries) with vaginal bleeding in the setting of placenta previa (bilobed placenta), bleeding now minimal red/pink with wiping. This is her second admission for bleeding with previa (first admission -)   --anterior and posterior placenta, would go through during    - US : EFW 89%ile, DEMARCO 17.5, continued bilobed placenta with thinning of the tissue traversing the os but can see placental tissue crossing between the lobes. - Breech presentation  - weekly BPP (next )    Plan:    - Bleeding previa: continue hospitalization with hospitalized bedrest. Continue TID monitoring. Has two large bore IVs. Rh pos.   - Discussed with MFM : given this is patient's second admission will plan for her to stay hospitalized until delivery. Deliver for heavy bleeding or at 36 weeks. Discussed with patient and she is aware. - Completed BMZ course -  - TID NSTs  - keep two IVs and up to date T&S (last T&S )     Orders/Charges: High    Patient states brown spotting  Has basically resolved. Has good fetal movement. No contractions. No headache or abdominal pain. No LOF. Vitals:  Visit Vitals  /69   Pulse (!) 106   Temp 98.5 °F (36.9 °C)   Resp 16   Ht 5' 2\" (1.575 m)   Wt 80.7 kg (178 lb)   LMP 06/15/2021   SpO2 98%   BMI 32.56 kg/m²     Temp (24hrs), Av.4 °F (36.9 °C), Min:97.9 °F (36.6 °C), Max:99 °F (37.2 °C)      Last 24hr Input/Output:  No intake or output data in the 24 hours ending 22 1002     Non stress test:  Cat I tracing     Baseline 120  +Accels  No decels  Mod variability    Uterine Activity: None     Exam:  Patient without distress.      Abdomen, fundus soft non-tender     Extremities, no redness or tenderness               Additional Exam: Deferred    Labs:     Lab Results   Component Value Date/Time    WBC 9.9 2022 09:51 PM    WBC 11.5 (H) 2022 06:24 PM    WBC 10.2 04/17/2022 09:25 PM    WBC 12.0 (H) 11/07/2019 06:33 PM    WBC 7.0 07/09/2018 09:25 PM    WBC 11.9 (H) 03/01/2017 05:13 PM    WBC 5.5 07/28/2014 09:52 AM    HGB 11.9 05/16/2022 09:51 PM    HGB 12.0 04/24/2022 06:24 PM    HGB 12.2 04/17/2022 09:25 PM    HGB 12.6 11/07/2019 06:33 PM    HGB 13.8 07/09/2018 09:25 PM    HGB 13.7 03/01/2017 05:13 PM    HGB 13.4 07/28/2014 09:52 AM    HCT 34.6 (L) 05/16/2022 09:51 PM    HCT 35.7 04/24/2022 06:24 PM    HCT 35.6 04/17/2022 09:25 PM    HCT 35.9 11/07/2019 06:33 PM    HCT 41.4 07/09/2018 09:25 PM    HCT 39.4 03/01/2017 05:13 PM    HCT 41.3 07/28/2014 09:52 AM    PLATELET 267 94/57/7283 09:51 PM    PLATELET 439 66/93/6848 06:24 PM    PLATELET 798 12/20/0125 09:25 PM    PLATELET 794 27/16/0421 06:33 PM    PLATELET 280 73/79/9187 09:25 PM    PLATELET 464 86/93/7318 05:13 PM    PLATELET 047 77/28/4215 09:52 AM       No results found for this or any previous visit (from the past 24 hour(s)).   Domo Somers MD

## 2022-05-21 NOTE — PROGRESS NOTES
7:10 PM  Bedside shift change report given to Hira Mcdaniel RN (oncoming nurse) by Jolanta Nguyen RN (offgoing nurse). Report included the following information SBAR, Kardex and Recent Results.

## 2022-05-22 PROCEDURE — 86900 BLOOD TYPING SEROLOGIC ABO: CPT

## 2022-05-22 PROCEDURE — 74011000250 HC RX REV CODE- 250: Performed by: OBSTETRICS & GYNECOLOGY

## 2022-05-22 PROCEDURE — 65410000002 HC RM PRIVATE OB

## 2022-05-22 PROCEDURE — 36415 COLL VENOUS BLD VENIPUNCTURE: CPT

## 2022-05-22 PROCEDURE — 86923 COMPATIBILITY TEST ELECTRIC: CPT

## 2022-05-22 PROCEDURE — 86644 CMV ANTIBODY: CPT

## 2022-05-22 RX ADMIN — SODIUM CHLORIDE, PRESERVATIVE FREE 10 ML: 5 INJECTION INTRAVENOUS at 08:46

## 2022-05-22 NOTE — ROUTINE PROCESS
Bedside shift change report given to SARITA Cueto (oncoming nurse) by Moiz Christy RN (offgoing nurse). Report included the following information SBAR, Intake/Output and MAR.

## 2022-05-22 NOTE — PROGRESS NOTES
Spiritual Care Assessment/Progress Note  [facility]      NAME: Payton Turcios      MRN: [id number]  AGE: 32 y.o.  SEX: female  Gnosticism Affiliation: Mandaen   Language: English     5/22/2022     Total Time (in minutes): 19     Spiritual Assessment begun in [facility] DELIVERY through conversation with:         []Patient        [] Family    [] Friend(s)        Reason for Consult: Initial/Spiritual assessment, patient floor     Spiritual beliefs: (Please include comment if needed)     [x] Identifies with a isabelle tradition:         [] Supported by a isabelle community:            [] Claims no spiritual orientation:           [] Seeking spiritual identity:                [] Adheres to an individual form of spirituality:           [] Not able to assess:                           Identified resources for coping:      [x] Prayer                               [] Music                  [] Guided Imagery     [x] Family/friends                 [] Pet visits     [] Devotional reading                         [] Unknown     [] Other:                                           Interventions offered during this visit: (See comments for more details)    Patient Interventions: Affirmation of emotions/emotional suffering,Affirmation of isabelle,Catharsis/review of pertinent events in supportive environment,Iconic (affirming the presence of God/Higher Power),Initial/Spiritual assessment, patient floor,Normalization of emotional/spiritual concerns,Prayer (assurance of),Gnosticism beliefs/image of God discussed           Plan of Care:     [] Support spiritual and/or cultural needs    [] Support AMD and/or advance care planning process      [] Support grieving process   [] Coordinate Rites and/or Rituals    [] Coordination with community clergy   [x] No spiritual needs identified at this time   [] Detailed Plan of Care below (See Comments)  [] Make referral to Music Therapy  [] Make referral to Pet Therapy     [] Make referral to Addiction services  [] Make referral to [facility]  [] Make referral to Spiritual Care Partner  [] No future visits requested        [x] Contact Spiritual Care for further referrals     Comments:  Reviewed chart prior to visit on L&D for spiritual assessment. No family/friends present. Provided supportive listening presence as Mrs Myrick Bloch spoke about events leading to her hospitalization. She was here about a month ago, went home and is now back until the baby is born. Her actual due date is in July, but the baby will be delivered in about 3 weeks. Mrs Myrick Bloch appeared to be in good spirits. She shared her children have been able to visit-they are 11and 332 years old. Her  is supportive. He takes off as much time as he can to visit her and care for the children. She has a friend who is helping out with the children also. She reports having good spiritual support. She expressed no spiritual concerns, but was receptive to assurance of prayer. Advised of ongoing availability of pastoral support.      SARAY Rivers, [facility], Staff [facility] [address]    [address] Paging Service  [phone])

## 2022-05-22 NOTE — PROGRESS NOTES
7:10 AM  Bedside shift change report given to Yesica Mi RN (oncoming nurse) by Lenny Marquez RN (offgoing nurse). Report included the following information SBAR, Kardex and Recent Results.

## 2022-05-22 NOTE — PROGRESS NOTES
Ante Partum Progress Note    Nahum Capone  33w3d     Assessment: 33w3d   33yo  (two prior vaginal deliveries) with vaginal bleeding in the setting of placenta previa (bilobed placenta), bleeding now minimal brown with wiping. This is her second admission for bleeding with previa (first admission -)   --anterior and posterior placenta, would go through during    - US : EFW 89%ile, DEMARCO 17.5, continued bilobed placenta with thinning of the tissue traversing the os but can see placental tissue crossing between the lobes. - Breech presentation  - weekly BPP (next )    Plan:    - Bleeding previa: continue hospitalization with hospitalized bedrest. Continue TID monitoring. Has two large bore IVs. Rh pos.   - Discussed with MFM : given this is patient's second admission will plan for her to stay hospitalized until delivery. Deliver for heavy bleeding or at 36 weeks. Discussed with patient and she is aware. - Completed BMZ course -  - TID NSTs  - keep two IVs and up to date T&S (last T&S ) - REPEAT TODAY       Orders/Charges: High    Patient states brown spotting  Has basically resolved. Has good fetal movement. No contractions. No headache or abdominal pain. No LOF. Vitals:  Visit Vitals  BP (!) 108/59 (BP 1 Location: Left upper arm, BP Patient Position: At rest)   Pulse (!) 106   Temp 98.4 °F (36.9 °C)   Resp 16   Ht 5' 2\" (1.575 m)   Wt 80.7 kg (178 lb)   LMP 06/15/2021   SpO2 97%   BMI 32.56 kg/m²     Temp (24hrs), Av.4 °F (36.9 °C), Min:98.2 °F (36.8 °C), Max:98.8 °F (37.1 °C)      Last 24hr Input/Output:  No intake or output data in the 24 hours ending 22 0949     Non stress test:  Cat I tracing     Baseline 120  +Accels  No decels  Mod variability    Uterine Activity: None   Interpreted by myself. 9:50 AM .nst    Exam:  Patient without distress.      Abdomen, fundus soft non-tender     Extremities, no redness or tenderness               Additional Exam: Deferred    Labs:     Lab Results   Component Value Date/Time    WBC 9.9 05/16/2022 09:51 PM    WBC 11.5 (H) 04/24/2022 06:24 PM    WBC 10.2 04/17/2022 09:25 PM    WBC 12.0 (H) 11/07/2019 06:33 PM    WBC 7.0 07/09/2018 09:25 PM    WBC 11.9 (H) 03/01/2017 05:13 PM    WBC 5.5 07/28/2014 09:52 AM    HGB 11.9 05/16/2022 09:51 PM    HGB 12.0 04/24/2022 06:24 PM    HGB 12.2 04/17/2022 09:25 PM    HGB 12.6 11/07/2019 06:33 PM    HGB 13.8 07/09/2018 09:25 PM    HGB 13.7 03/01/2017 05:13 PM    HGB 13.4 07/28/2014 09:52 AM    HCT 34.6 (L) 05/16/2022 09:51 PM    HCT 35.7 04/24/2022 06:24 PM    HCT 35.6 04/17/2022 09:25 PM    HCT 35.9 11/07/2019 06:33 PM    HCT 41.4 07/09/2018 09:25 PM    HCT 39.4 03/01/2017 05:13 PM    HCT 41.3 07/28/2014 09:52 AM    PLATELET 467 95/40/7762 09:51 PM    PLATELET 790 52/71/4761 06:24 PM    PLATELET 839 73/12/0734 09:25 PM    PLATELET 060 27/22/2931 06:33 PM    PLATELET 100 90/27/0126 09:25 PM    PLATELET 416 40/47/4112 05:13 PM    PLATELET 617 53/21/2181 09:52 AM       No results found for this or any previous visit (from the past 24 hour(s)).   Nilesh Ortega MD

## 2022-05-22 NOTE — PROGRESS NOTES
NST Procedure Note    Patient: Que Bryan               Sex: female          DOA: 5/16/2022       YOB: 1995      Age:  32 y.o.        LOS:  LOS: 6 days     MRN: [id number]                    CSN: [id number]      Estimated Gestational Age:33w3d     Indication for NST: placenta previa with vaginal bleeding    NST: 15x15    Fetal Vital Signs:  Mode: External  Fetal Heart Rate:140  Fetal Activity: Present  Variability: Moderate 6-25 bpm  Decelerations:No  Accelerations:Yes  FHR Interpretations:Category I    Non-Stress Test: obgyn fetal nst findings: reactive    Uterine Activity:  Mode: External  Frequency (min): none   Duration (sec): n/a   Intensity: n/a  Uterine Resting Tone: Relaxed    Signed by:Taylor Talbert MD  5/22/2022 9:50 AM

## 2022-05-23 PROCEDURE — 74011000250 HC RX REV CODE- 250: Performed by: OBSTETRICS & GYNECOLOGY

## 2022-05-23 PROCEDURE — 65410000002 HC RM PRIVATE OB

## 2022-05-23 PROCEDURE — 59025 FETAL NON-STRESS TEST: CPT

## 2022-05-23 RX ADMIN — SODIUM CHLORIDE, PRESERVATIVE FREE 10 ML: 5 INJECTION INTRAVENOUS at 20:15

## 2022-05-23 NOTE — ROUTINE PROCESS
Bedside and Verbal shift change report given to SARITA Gary (oncoming nurse) by Mohit Morales RN (offgoing nurse). Report included the following information SBAR, Intake/Output and MAR.

## 2022-05-23 NOTE — PROGRESS NOTES
Ante Partum Progress Note    Carolina Vargas  33w4d     Assessment: 33w4d   31yo  (two prior vaginal deliveries) with vaginal bleeding in the setting of placenta previa (bilobed placenta), bleeding now minimal brown with wiping. This is her second admission for bleeding with previa (first admission -)   --anterior and posterior placenta, would go through during    - US : EFW 89%ile, DEMARCO 17.5, continued bilobed placenta with thinning of the tissue traversing the os but can see placental tissue crossing between the lobes. - Breech presentation  - weekly BPP (next )    Plan:    - Bleeding previa: continue hospitalization with hospitalized bedrest. Continue TID monitoring. Has two large bore IVs. Rh pos.   - Discussed with MFM : given this is patient's second admission will plan for her to stay hospitalized until delivery. Deliver for heavy bleeding or at 36 weeks. Discussed with patient and she is aware. - Completed BMZ course -  - TID NSTs  - keep two IVs and up to date T&S (last T&S )        Orders/Charges: High    Patient continues to have some light blood / brown spotting with wiping. Has good fetal movement. No contractions. No headache or abdominal pain. No LOF. Vitals:  Visit Vitals  /70 (BP 1 Location: Right upper arm, BP Patient Position: At rest)   Pulse 94   Temp 98.4 °F (36.9 °C)   Resp 16   Ht 5' 2\" (1.575 m)   Wt 80.7 kg (178 lb)   LMP 06/15/2021   SpO2 98%   BMI 32.56 kg/m²     Temp (24hrs), Av.1 °F (36.7 °C), Min:97.8 °F (36.6 °C), Max:98.4 °F (36.9 °C)      Last 24hr Input/Output:  No intake or output data in the 24 hours ending 22 1018     Non stress test:  Cat I tracing     Baseline 120  +Accels  No decels  Mod variability    Uterine Activity: None   Interpreted by myself. 0830     Exam:  Patient without distress.      Abdomen, fundus soft non-tender     Extremities, no redness or tenderness               Additional Exam: Deferred    Labs:     Lab Results   Component Value Date/Time    WBC 9.9 05/16/2022 09:51 PM    WBC 11.5 (H) 04/24/2022 06:24 PM    WBC 10.2 04/17/2022 09:25 PM    WBC 12.0 (H) 11/07/2019 06:33 PM    WBC 7.0 07/09/2018 09:25 PM    WBC 11.9 (H) 03/01/2017 05:13 PM    WBC 5.5 07/28/2014 09:52 AM    HGB 11.9 05/16/2022 09:51 PM    HGB 12.0 04/24/2022 06:24 PM    HGB 12.2 04/17/2022 09:25 PM    HGB 12.6 11/07/2019 06:33 PM    HGB 13.8 07/09/2018 09:25 PM    HGB 13.7 03/01/2017 05:13 PM    HGB 13.4 07/28/2014 09:52 AM    HCT 34.6 (L) 05/16/2022 09:51 PM    HCT 35.7 04/24/2022 06:24 PM    HCT 35.6 04/17/2022 09:25 PM    HCT 35.9 11/07/2019 06:33 PM    HCT 41.4 07/09/2018 09:25 PM    HCT 39.4 03/01/2017 05:13 PM    HCT 41.3 07/28/2014 09:52 AM    PLATELET 971 94/16/6401 09:51 PM    PLATELET 193 62/54/3851 06:24 PM    PLATELET 115 64/60/8985 09:25 PM    PLATELET 186 82/01/5795 06:33 PM    PLATELET 538 06/45/0766 09:25 PM    PLATELET 076 43/49/3916 05:13 PM    PLATELET 370 06/94/5952 09:52 AM       Recent Results (from the past 24 hour(s))   TYPE & SCREEN    Collection Time: 05/22/22  9:00 PM   Result Value Ref Range    Crossmatch Expiration 05/25/2022,2359     ABO/Rh(D) Nilay You POSITIVE     Antibody screen NEG      Janette Pond MD

## 2022-05-23 NOTE — PROGRESS NOTES
0230 - Pt called this nurse and stated that upon using the bathroom she noticed new bright red bleeding with wiping.     0235 - This nurse assessed pt @ bedside. Scant red bleeding noted on ildefonso pad. Pt denies contractions, leaking fluid or pain. Pt placed on monitor.     0240 - TC to Dr. Yamel Noble and informed of bleeding. Rec'd orders to complete a NST and to notify if bleeding increases. 80 - Updated pt on plan of care. 3253 - NST complete and reactive.

## 2022-05-23 NOTE — ROUTINE PROCESS
Bedside shift change report given to SARITA Kaiser RN (oncoming nurse) by Evelin Griffiths (offgoing nurse). Report included the following information SBAR, Intake/Output and MAR.

## 2022-05-24 PROCEDURE — 74011250637 HC RX REV CODE- 250/637: Performed by: OBSTETRICS & GYNECOLOGY

## 2022-05-24 PROCEDURE — 74011000250 HC RX REV CODE- 250: Performed by: OBSTETRICS & GYNECOLOGY

## 2022-05-24 PROCEDURE — 74011250636 HC RX REV CODE- 250/636: Performed by: OBSTETRICS & GYNECOLOGY

## 2022-05-24 PROCEDURE — 65410000002 HC RM PRIVATE OB

## 2022-05-24 RX ORDER — ACETAMINOPHEN 325 MG/1
650 TABLET ORAL
Status: DISCONTINUED | OUTPATIENT
Start: 2022-05-24 | End: 2022-05-25

## 2022-05-24 RX ORDER — BETAMETHASONE SODIUM PHOSPHATE AND BETAMETHASONE ACETATE 3; 3 MG/ML; MG/ML
12 INJECTION, SUSPENSION INTRA-ARTICULAR; INTRALESIONAL; INTRAMUSCULAR; SOFT TISSUE EVERY 24 HOURS
Status: DISCONTINUED | OUTPATIENT
Start: 2022-05-24 | End: 2022-05-24 | Stop reason: SDUPTHER

## 2022-05-24 RX ORDER — BETAMETHASONE SODIUM PHOSPHATE AND BETAMETHASONE ACETATE 3; 3 MG/ML; MG/ML
12 INJECTION, SUSPENSION INTRA-ARTICULAR; INTRALESIONAL; INTRAMUSCULAR; SOFT TISSUE EVERY 24 HOURS
Status: DISCONTINUED | OUTPATIENT
Start: 2022-05-25 | End: 2022-05-25

## 2022-05-24 RX ORDER — DIPHENHYDRAMINE HCL 25 MG
25 CAPSULE ORAL
Status: DISCONTINUED | OUTPATIENT
Start: 2022-05-24 | End: 2022-05-25 | Stop reason: SDUPTHER

## 2022-05-24 RX ORDER — BETAMETHASONE SODIUM PHOSPHATE AND BETAMETHASONE ACETATE 3; 3 MG/ML; MG/ML
INJECTION, SUSPENSION INTRA-ARTICULAR; INTRALESIONAL; INTRAMUSCULAR; SOFT TISSUE
Status: COMPLETED
Start: 2022-05-24 | End: 2022-05-25

## 2022-05-24 RX ORDER — SODIUM CHLORIDE 0.9 % (FLUSH) 0.9 %
5-40 SYRINGE (ML) INJECTION EVERY 8 HOURS
Status: DISCONTINUED | OUTPATIENT
Start: 2022-05-24 | End: 2022-05-25

## 2022-05-24 RX ORDER — SODIUM CHLORIDE 0.9 % (FLUSH) 0.9 %
5-40 SYRINGE (ML) INJECTION AS NEEDED
Status: DISCONTINUED | OUTPATIENT
Start: 2022-05-24 | End: 2022-05-25

## 2022-05-24 RX ADMIN — BETAMETHASONE SODIUM PHOSPHATE AND BETAMETHASONE ACETATE 12 MG: 3; 3 INJECTION, SUSPENSION INTRA-ARTICULAR; INTRALESIONAL; INTRAMUSCULAR at 13:14

## 2022-05-24 RX ADMIN — SODIUM CHLORIDE, PRESERVATIVE FREE 10 ML: 5 INJECTION INTRAVENOUS at 18:45

## 2022-05-24 RX ADMIN — SODIUM CHLORIDE, PRESERVATIVE FREE 10 ML: 5 INJECTION INTRAVENOUS at 06:38

## 2022-05-24 RX ADMIN — ACETAMINOPHEN 650 MG: 325 TABLET ORAL at 13:27

## 2022-05-24 NOTE — PROGRESS NOTES
Ante Partum Progress Note    Altagracia Norman  33w5d     Assessment: 33w5d   31yo  (two prior vaginal deliveries) with vaginal bleeding in the setting of placenta previa (bilobed placenta), bleeding now red/brown with wiping. This is her second admission for bleeding with previa (first admission -)   --anterior and posterior placenta, would go through during    - US : EFW 89%ile, DEMARCO 17.5, continued bilobed placenta with thinning of the tissue traversing the os but can see placental tissue crossing between the lobes. - Breech presentation  - weekly BPP (next )    Plan:    - Bleeding previa: continue hospitalization with hospitalized bedrest. Continue TID monitoring. Has two large bore IVs. Rh pos.   - Discussed with MFM : given this is patient's second admission will plan for her to stay hospitalized until delivery. Deliver for heavy bleeding or at 36 weeks. Discussed with patient and she is aware. - Completed BMZ course -; will start rescue course today , for completion .   - TID NSTs  - keep two IVs and up to date T&S (last T&S )        Orders/Charges: High    Patient continues to have red and brown blood with wiping. Has good fetal movement. No contractions other than BHs. No headache or abdominal pain. No LOF. Vitals:  Visit Vitals  /61 (BP 1 Location: Right arm, BP Patient Position: Sitting)   Pulse 100   Temp 98.4 °F (36.9 °C)   Resp 16   Ht 5' 2\" (1.575 m)   Wt 80.7 kg (178 lb)   LMP 06/15/2021   SpO2 98%   BMI 32.56 kg/m²     Temp (24hrs), Av.1 °F (36.7 °C), Min:97.8 °F (36.6 °C), Max:98.4 °F (36.9 °C)      Last 24hr Input/Output:  No intake or output data in the 24 hours ending 22 0939     Non stress test:  Cat I tracing     Baseline 120  +Accels  No decels  Mod variability    Uterine Activity: None       Exam:  Patient without distress.      Abdomen, fundus soft non-tender     Extremities, no redness or tenderness Additional Exam: Deferred    Labs:     No results found for this or any previous visit (from the past 24 hour(s)).   Naila Beck MD

## 2022-05-24 NOTE — ROUTINE PROCESS
Bedside shift change report given to NIGEL Silverman RN (oncoming nurse) by Michell Reddy (offgoing nurse). Report included the following information SBAR, Intake/Output and MAR.

## 2022-05-24 NOTE — PROGRESS NOTES
2145 - Pt called this nurse and stated that her bleeding had changed from a \"rust\" color to bright red again. Small amount noted with wiping. Pt denies any contractions, leaking fluid or pain. 2147 - Dr. Tamara Cleaning notified of change. Informed that patients Type & Screen is up to date and she has 2 IVs in place that are up to date. No new orders received at this time. 2150 - Pt updated on plan of care. Instructed to notify of any increase in bleeding. FHR spot check 144. Fetal movement present. Uterine tone relaxed upon palpitation.

## 2022-05-24 NOTE — PROGRESS NOTES
4832: NST started, fetal movement observed. 1015: Reassuring strip, without being reactive. Per Dr. Marian Aguilar, ok to repeat after lunch.

## 2022-05-25 ENCOUNTER — ANESTHESIA EVENT (OUTPATIENT)
Dept: LABOR AND DELIVERY | Age: 27
End: 2022-05-25
Payer: COMMERCIAL

## 2022-05-25 ENCOUNTER — ANESTHESIA (OUTPATIENT)
Dept: LABOR AND DELIVERY | Age: 27
End: 2022-05-25
Payer: COMMERCIAL

## 2022-05-25 LAB — HISTORY CHECKED?,CKHIST: NORMAL

## 2022-05-25 PROCEDURE — 76060000078 HC EPIDURAL ANESTHESIA

## 2022-05-25 PROCEDURE — 74011250636 HC RX REV CODE- 250/636: Performed by: ANESTHESIOLOGY

## 2022-05-25 PROCEDURE — 65410000002 HC RM PRIVATE OB

## 2022-05-25 PROCEDURE — 76010000391 HC C SECN FIRST 1 HR: Performed by: OBSTETRICS & GYNECOLOGY

## 2022-05-25 PROCEDURE — 74011000250 HC RX REV CODE- 250: Performed by: OBSTETRICS & GYNECOLOGY

## 2022-05-25 PROCEDURE — 77030007866 HC KT SPN ANES BBMI -B: Performed by: ANESTHESIOLOGY

## 2022-05-25 PROCEDURE — 59025 FETAL NON-STRESS TEST: CPT

## 2022-05-25 PROCEDURE — 76060000078 HC EPIDURAL ANESTHESIA: Performed by: OBSTETRICS & GYNECOLOGY

## 2022-05-25 PROCEDURE — 75410000002 HC LABOR FEE PER 1 HR

## 2022-05-25 PROCEDURE — 74011250637 HC RX REV CODE- 250/637: Performed by: OBSTETRICS & GYNECOLOGY

## 2022-05-25 PROCEDURE — 76060000032 HC ANESTHESIA 0.5 TO 1 HR: Performed by: OBSTETRICS & GYNECOLOGY

## 2022-05-25 PROCEDURE — 74011250636 HC RX REV CODE- 250/636

## 2022-05-25 PROCEDURE — 75410000003 HC RECOV DEL/VAG/CSECN EA 0.5 HR

## 2022-05-25 PROCEDURE — 77010026065 HC OXYGEN MINIMUM MEDICAL AIR

## 2022-05-25 PROCEDURE — 77030008683 HC TU ET CUF COVD -A: Performed by: ANESTHESIOLOGY

## 2022-05-25 PROCEDURE — 74011000250 HC RX REV CODE- 250: Performed by: ANESTHESIOLOGY

## 2022-05-25 PROCEDURE — 74011250636 HC RX REV CODE- 250/636: Performed by: OBSTETRICS & GYNECOLOGY

## 2022-05-25 RX ORDER — OXYTOCIN/RINGER'S LACTATE 30/500 ML
87.3 PLASTIC BAG, INJECTION (ML) INTRAVENOUS AS NEEDED
Status: DISCONTINUED | OUTPATIENT
Start: 2022-05-25 | End: 2022-05-25

## 2022-05-25 RX ORDER — DOCUSATE SODIUM 100 MG/1
100 CAPSULE, LIQUID FILLED ORAL
Status: DISCONTINUED | OUTPATIENT
Start: 2022-05-25 | End: 2022-05-27 | Stop reason: HOSPADM

## 2022-05-25 RX ORDER — OXYTOCIN/RINGER'S LACTATE 30/500 ML
87.3 PLASTIC BAG, INJECTION (ML) INTRAVENOUS AS NEEDED
Status: DISCONTINUED | OUTPATIENT
Start: 2022-05-25 | End: 2022-05-27 | Stop reason: HOSPADM

## 2022-05-25 RX ORDER — DIPHENHYDRAMINE HYDROCHLORIDE 50 MG/ML
12.5 INJECTION, SOLUTION INTRAMUSCULAR; INTRAVENOUS
Status: DISCONTINUED | OUTPATIENT
Start: 2022-05-25 | End: 2022-05-27 | Stop reason: HOSPADM

## 2022-05-25 RX ORDER — OXYCODONE AND ACETAMINOPHEN 5; 325 MG/1; MG/1
2 TABLET ORAL
Status: DISCONTINUED | OUTPATIENT
Start: 2022-05-25 | End: 2022-05-27 | Stop reason: HOSPADM

## 2022-05-25 RX ORDER — HYDROCORTISONE ACETATE PRAMOXINE HCL 2.5; 1 G/100G; G/100G
CREAM TOPICAL AS NEEDED
Status: DISCONTINUED | OUTPATIENT
Start: 2022-05-25 | End: 2022-05-27 | Stop reason: HOSPADM

## 2022-05-25 RX ORDER — SODIUM CHLORIDE, SODIUM LACTATE, POTASSIUM CHLORIDE, CALCIUM CHLORIDE 600; 310; 30; 20 MG/100ML; MG/100ML; MG/100ML; MG/100ML
1000 INJECTION, SOLUTION INTRAVENOUS CONTINUOUS
Status: DISCONTINUED | OUTPATIENT
Start: 2022-05-25 | End: 2022-05-25

## 2022-05-25 RX ORDER — KETOROLAC TROMETHAMINE 30 MG/ML
30 INJECTION, SOLUTION INTRAMUSCULAR; INTRAVENOUS
Status: ACTIVE | OUTPATIENT
Start: 2022-05-25 | End: 2022-05-26

## 2022-05-25 RX ORDER — OXYTOCIN/RINGER'S LACTATE 30/500 ML
PLASTIC BAG, INJECTION (ML) INTRAVENOUS
Status: DISCONTINUED | OUTPATIENT
Start: 2022-05-25 | End: 2022-05-25 | Stop reason: HOSPADM

## 2022-05-25 RX ORDER — OXYTOCIN/RINGER'S LACTATE 30/500 ML
10 PLASTIC BAG, INJECTION (ML) INTRAVENOUS AS NEEDED
Status: DISCONTINUED | OUTPATIENT
Start: 2022-05-25 | End: 2022-05-27 | Stop reason: HOSPADM

## 2022-05-25 RX ORDER — NALOXONE HYDROCHLORIDE 0.4 MG/ML
0.2 INJECTION, SOLUTION INTRAMUSCULAR; INTRAVENOUS; SUBCUTANEOUS
Status: DISCONTINUED | OUTPATIENT
Start: 2022-05-25 | End: 2022-05-25

## 2022-05-25 RX ORDER — METHYLERGONOVINE MALEATE 0.2 MG/ML
0.2 INJECTION INTRAVENOUS ONCE
Status: DISCONTINUED | OUTPATIENT
Start: 2022-05-25 | End: 2022-05-25

## 2022-05-25 RX ORDER — SODIUM CHLORIDE 0.9 % (FLUSH) 0.9 %
5-40 SYRINGE (ML) INJECTION AS NEEDED
Status: DISCONTINUED | OUTPATIENT
Start: 2022-05-25 | End: 2022-05-27 | Stop reason: HOSPADM

## 2022-05-25 RX ORDER — PHENYLEPHRINE HCL IN 0.9% NACL 0.4MG/10ML
SYRINGE (ML) INTRAVENOUS AS NEEDED
Status: DISCONTINUED | OUTPATIENT
Start: 2022-05-25 | End: 2022-05-25 | Stop reason: HOSPADM

## 2022-05-25 RX ORDER — CEFAZOLIN SODIUM 1 G/3ML
INJECTION, POWDER, FOR SOLUTION INTRAMUSCULAR; INTRAVENOUS AS NEEDED
Status: DISCONTINUED | OUTPATIENT
Start: 2022-05-25 | End: 2022-05-25 | Stop reason: HOSPADM

## 2022-05-25 RX ORDER — ONDANSETRON 2 MG/ML
4 INJECTION INTRAMUSCULAR; INTRAVENOUS
Status: DISCONTINUED | OUTPATIENT
Start: 2022-05-25 | End: 2022-05-25 | Stop reason: SDUPTHER

## 2022-05-25 RX ORDER — SIMETHICONE 80 MG
80 TABLET,CHEWABLE ORAL
Status: DISCONTINUED | OUTPATIENT
Start: 2022-05-25 | End: 2022-05-27 | Stop reason: HOSPADM

## 2022-05-25 RX ORDER — SODIUM CHLORIDE 9 MG/ML
250 INJECTION, SOLUTION INTRAVENOUS AS NEEDED
Status: DISCONTINUED | OUTPATIENT
Start: 2022-05-25 | End: 2022-05-25

## 2022-05-25 RX ORDER — ONDANSETRON 4 MG/1
4 TABLET, ORALLY DISINTEGRATING ORAL
Status: ACTIVE | OUTPATIENT
Start: 2022-05-25 | End: 2022-05-26

## 2022-05-25 RX ORDER — NALOXONE HYDROCHLORIDE 0.4 MG/ML
0.4 INJECTION, SOLUTION INTRAMUSCULAR; INTRAVENOUS; SUBCUTANEOUS AS NEEDED
Status: DISCONTINUED | OUTPATIENT
Start: 2022-05-25 | End: 2022-05-25

## 2022-05-25 RX ORDER — OXYCODONE AND ACETAMINOPHEN 5; 325 MG/1; MG/1
1 TABLET ORAL
Status: DISCONTINUED | OUTPATIENT
Start: 2022-05-25 | End: 2022-05-25

## 2022-05-25 RX ORDER — OXYTOCIN/RINGER'S LACTATE 30/500 ML
PLASTIC BAG, INJECTION (ML) INTRAVENOUS
Status: COMPLETED
Start: 2022-05-25 | End: 2022-05-25

## 2022-05-25 RX ORDER — SODIUM CHLORIDE, SODIUM LACTATE, POTASSIUM CHLORIDE, CALCIUM CHLORIDE 600; 310; 30; 20 MG/100ML; MG/100ML; MG/100ML; MG/100ML
125 INJECTION, SOLUTION INTRAVENOUS CONTINUOUS
Status: DISCONTINUED | OUTPATIENT
Start: 2022-05-25 | End: 2022-05-27 | Stop reason: HOSPADM

## 2022-05-25 RX ORDER — METHYLERGONOVINE MALEATE 0.2 MG/ML
0.2 INJECTION INTRAVENOUS ONCE
Status: COMPLETED | OUTPATIENT
Start: 2022-05-25 | End: 2022-05-25

## 2022-05-25 RX ORDER — IBUPROFEN 400 MG/1
800 TABLET ORAL EVERY 8 HOURS
Status: DISCONTINUED | OUTPATIENT
Start: 2022-05-25 | End: 2022-05-25

## 2022-05-25 RX ORDER — DIPHENHYDRAMINE HCL 25 MG
25 CAPSULE ORAL
Status: DISCONTINUED | OUTPATIENT
Start: 2022-05-25 | End: 2022-05-27 | Stop reason: HOSPADM

## 2022-05-25 RX ORDER — BUPIVACAINE HYDROCHLORIDE 7.5 MG/ML
INJECTION, SOLUTION EPIDURAL; RETROBULBAR
Status: COMPLETED | OUTPATIENT
Start: 2022-05-25 | End: 2022-05-25

## 2022-05-25 RX ORDER — MORPHINE SULFATE 0.5 MG/ML
INJECTION, SOLUTION EPIDURAL; INTRATHECAL; INTRAVENOUS
Status: COMPLETED | OUTPATIENT
Start: 2022-05-25 | End: 2022-05-25

## 2022-05-25 RX ORDER — NALOXONE HYDROCHLORIDE 0.4 MG/ML
0.4 INJECTION, SOLUTION INTRAMUSCULAR; INTRAVENOUS; SUBCUTANEOUS AS NEEDED
Status: DISCONTINUED | OUTPATIENT
Start: 2022-05-25 | End: 2022-05-27 | Stop reason: HOSPADM

## 2022-05-25 RX ORDER — OXYTOCIN/RINGER'S LACTATE 30/500 ML
10 PLASTIC BAG, INJECTION (ML) INTRAVENOUS AS NEEDED
Status: DISCONTINUED | OUTPATIENT
Start: 2022-05-25 | End: 2022-05-25

## 2022-05-25 RX ORDER — DOCUSATE SODIUM 100 MG/1
100 CAPSULE, LIQUID FILLED ORAL 2 TIMES DAILY
Status: DISCONTINUED | OUTPATIENT
Start: 2022-05-25 | End: 2022-05-27 | Stop reason: HOSPADM

## 2022-05-25 RX ORDER — SODIUM CHLORIDE 0.9 % (FLUSH) 0.9 %
5-40 SYRINGE (ML) INJECTION EVERY 8 HOURS
Status: DISCONTINUED | OUTPATIENT
Start: 2022-05-25 | End: 2022-05-27 | Stop reason: HOSPADM

## 2022-05-25 RX ORDER — ACETAMINOPHEN 325 MG/1
650 TABLET ORAL
Status: DISCONTINUED | OUTPATIENT
Start: 2022-05-25 | End: 2022-05-27 | Stop reason: HOSPADM

## 2022-05-25 RX ORDER — IBUPROFEN 400 MG/1
800 TABLET ORAL EVERY 8 HOURS
Status: DISCONTINUED | OUTPATIENT
Start: 2022-05-25 | End: 2022-05-27 | Stop reason: HOSPADM

## 2022-05-25 RX ORDER — SIMETHICONE 80 MG
80 TABLET,CHEWABLE ORAL AS NEEDED
Status: DISCONTINUED | OUTPATIENT
Start: 2022-05-25 | End: 2022-05-25 | Stop reason: SDUPTHER

## 2022-05-25 RX ADMIN — Medication 909 ML/HR: at 10:17

## 2022-05-25 RX ADMIN — SODIUM CHLORIDE, PRESERVATIVE FREE 10 ML: 5 INJECTION INTRAVENOUS at 21:23

## 2022-05-25 RX ADMIN — SODIUM CHLORIDE, POTASSIUM CHLORIDE, SODIUM LACTATE AND CALCIUM CHLORIDE 125 ML/HR: 600; 310; 30; 20 INJECTION, SOLUTION INTRAVENOUS at 11:00

## 2022-05-25 RX ADMIN — Medication 40 MCG: at 10:12

## 2022-05-25 RX ADMIN — Medication 2 MG: at 10:07

## 2022-05-25 RX ADMIN — IBUPROFEN 800 MG: 400 TABLET, FILM COATED ORAL at 21:23

## 2022-05-25 RX ADMIN — BETAMETHASONE SODIUM PHOSPHATE AND BETAMETHASONE ACETATE 12 MG: 3; 3 INJECTION, SUSPENSION INTRA-ARTICULAR; INTRALESIONAL; INTRAMUSCULAR at 09:12

## 2022-05-25 RX ADMIN — METHYLERGONOVINE MALEATE 0.2 MG: 0.2 INJECTION, SOLUTION INTRAMUSCULAR; INTRAVENOUS at 11:06

## 2022-05-25 RX ADMIN — SODIUM CHLORIDE, POTASSIUM CHLORIDE, SODIUM LACTATE AND CALCIUM CHLORIDE 125 ML/HR: 600; 310; 30; 20 INJECTION, SOLUTION INTRAVENOUS at 14:55

## 2022-05-25 RX ADMIN — CEFAZOLIN 2 G: 1 INJECTION, POWDER, FOR SOLUTION INTRAMUSCULAR; INTRAVENOUS; PARENTERAL at 09:59

## 2022-05-25 RX ADMIN — BETAMETHASONE SODIUM PHOSPHATE AND BETAMETHASONE ACETATE 12 MG: 3; 3 INJECTION, SUSPENSION INTRA-ARTICULAR; INTRALESIONAL; INTRAMUSCULAR; SOFT TISSUE at 09:12

## 2022-05-25 RX ADMIN — SODIUM CHLORIDE, POTASSIUM CHLORIDE, SODIUM LACTATE AND CALCIUM CHLORIDE 125 ML/HR: 600; 310; 30; 20 INJECTION, SOLUTION INTRAVENOUS at 23:23

## 2022-05-25 RX ADMIN — BUPIVACAINE HYDROCHLORIDE 12 MG: 7.5 INJECTION, SOLUTION EPIDURAL; RETROBULBAR at 10:07

## 2022-05-25 RX ADMIN — SODIUM CHLORIDE, POTASSIUM CHLORIDE, SODIUM LACTATE AND CALCIUM CHLORIDE 1000 ML: 600; 310; 30; 20 INJECTION, SOLUTION INTRAVENOUS at 08:25

## 2022-05-25 NOTE — ANESTHESIA POSTPROCEDURE EVALUATION
Procedure(s):   SECTION. spinal    Anesthesia Post Evaluation      Multimodal analgesia: multimodal analgesia used between 6 hours prior to anesthesia start to PACU discharge  Patient location during evaluation: PACU  Patient participation: complete - patient participated  Level of consciousness: sleepy but conscious and responsive to verbal stimuli  Pain score: 0  Pain management: adequate  Airway patency: patent  Anesthetic complications: no  Cardiovascular status: acceptable  Respiratory status: acceptable  Hydration status: acceptable  Comments: +Post-Anesthesia Evaluation and Assessment    Patient: Nahum Capone MRN: [id number]  SSN: [id number]   YOB: 1995  Age: 32 y.o. Sex: female      Cardiovascular Function/Vital Signs    /74   Pulse (!) 134   Temp 36.9 °C (98.5 °F)   Resp 16   Ht 5' 2\" (1.575 m)   Wt 80.7 kg (178 lb)   SpO2 97%   BMI 32.56 kg/m²     Patient is status post Procedure(s):   SECTION. Nausea/Vomiting: Controlled. Postoperative hydration reviewed and adequate. Pain:  Pain Scale 1: Numeric (0 - 10) (22 0114)  Pain Intensity 1: 0 (22 0114)   Managed. Neurological Status:   Neuro (WDL): Within Defined Limits (22 0800)   At baseline. Mental Status and Level of Consciousness: Arousable. Pulmonary Status:   O2 Device: None (Room air) (22 0720)   Adequate oxygenation and airway patent. Complications related to anesthesia: None    Post-anesthesia assessment completed. No concerns. Signed By: Roberto Polanco MD    2022  Post anesthesia nausea and vomiting:  controlled  Final Post Anesthesia Temperature Assessment:  Normothermia (36.0-37.5 degrees C)      INITIAL Post-op Vital signs: No vitals data found for the desired time range.

## 2022-05-25 NOTE — OP NOTES
Operative Note    Name: Cinthia Birch   Medical Record Number: [id number]   YOB: 1995  Today's Date: May 25, 2022      Pre-operative Diagnosis: IUP at 33w6d, complete previa with heavy bleed,     Post-operative Diagnosis: post op c/s    Operation: Primary Low Transverse  Section    Surgeon(s): Lorence Soulier, MD Medora Foots, MD    Anesthesia: Spinal    Prophylactic Antibiotics: Ancef 2gm  DVT Prophylaxis: Sequential Compression Devices         Fetal Description: castillo     Birth Information:   Information for the patient's :  Lizz Camargo [[id number]]   Delivery of a 2.275 kg male infant on 2022 at 10:17 AM. Apgars were 8  and 9 . Umbilical Cord:       Umbilical Cord Events:       Placenta:   removal with   appearance. Amniotic Fluid Volume:        Amniotic Fluid Description:           Umbilical Cord: 3 vessels    Placenta:  expressed    Estimated Blood Loss (ml):      Specimens: None           Complications:  None    Procedure Detail:      After proper patient identification and consent, the patient was taken to the operating room, where spinal anesthesia was administered and found to be adequate. Mejia catheter had been placed using sterile technique. The patient was prepped and draped in the normal sterile fashion. The abdomen was entered using the rosalee licona technique. The peritoneum was entered bluntly well superior to the bladder without any apparent injury. The Ethan retractor was placed. A low transverse uterine incision was made with the scalpel and extended with blunt finger dissection. Amniotomy was performed and the fluid was medium amount clear. The babys head was then delivered atraumatically. The remainder of the body was delivered. The cord was clamped and cut and the baby was handed off to  staff in attendance. Placenta was then removed from the uterus.  The uterus was curettaged with a lap pad and cleared of all clots and debris. The uterine incision was closed with 0 monocryl in running locking fashion, followed by a second imbricating layer of 0 monocryl. Good hemostasis was again reassured and the Ethan retractor was removed. The fascia was closed with 0 Vicryl in a running fashion. Good hemostasis was assured. The subcutaneous tissue was irrigated with warm saline and made hemostatic with the bovie cautery. The subcutaneous tissue was closed with 2-0 plain gut in a running fashion. The skin was closed with the Insorb subcuticular closure device followed by dermabond. The patient tolerated the procedure well. Sponge, lap, and needle counts were correct times three and the patient and baby were taken to recovery/postpartum room in stable condition.     Claudine Méndez MD  May 25, 2022  10:52 AM

## 2022-05-25 NOTE — ANESTHESIA PROCEDURE NOTES
Spinal Block    Start time: 5/25/2022 9:58 AM  End time: 5/25/2022 10:08 AM  Performed by: Chantelle Brothers MD  Authorized by: Chantelle Brothers MD     Pre-procedure:   Indications: primary anesthetic  Preanesthetic Checklist: patient identified, risks and benefits discussed, anesthesia consent, site marked, patient being monitored and timeout performed    Timeout Time: 09:58 EDT          Spinal Block:   Patient Position:  Seated  Prep Region:  Thoracic      Location:  L3-4  Technique:  Single shot        Needle:   Needle Type:  Pencan  Needle Gauge:  24 G        Events: CSF confirmed, no blood with aspiration and no paresthesia        Assessment:  Insertion:  Uncomplicated  Patient tolerance:  Patient tolerated the procedure well with no immediate complications

## 2022-05-25 NOTE — PROGRESS NOTES
Nursing reported increase in bleeding. To bedside and patient notes significant change in bleeding overnight, with small bright red clots. Has been bleeding bright red on and off since her second admission    Maternal HR 120s-130s   FHT 160s    Discussed with patient the significant increase in bleeding is concerning. She is nearly 34 weeks and received bmz and booster dose. We reviewed in my opinion the safest option is to move forward with delivery now in controlled fashion and she is in agreement. We reviewed the need for NICU care but we both agreed that the benefits of delivery now outweigh the risks. Reviewed risks of bleeding requiring blood transfusion, infection, damage to surrounding structures including bowel, bladder, blood vessels, nerves, ureter, uterus, tubes, ovaries, dvt/PE, need for additional procedures including hysterectomy. Consent was signed and she agreed to proceed. Starting hgb 11.9, type and screen is active - will put in for type and cross.

## 2022-05-25 NOTE — L&D DELIVERY NOTE
Delivery Summary  Patient: Alex Sotomayor             Circumcision:   Desires - but baby going to nicu  Additional Delivery Comments - PLTCS at 33w6d due to complete previa - recurrent heavy bleed. See op note. Methergine given post op for mild atony. Information for the patient's :  Camilla Olmstead [[id number]]     Delivery Type: , Low Transverse   Delivery Date: 2022   Delivery Time: 10:17 AM     Birth Weight: 2.275 kg     Sex:  male  Delivery Clinician:  Chapo Hirsch   Gestational Age: 32w10d    Presentation:     Position:             Apgars were 8  and 9      Resuscitation Method: Suctioning-bulb; Tactile Stimulation     Meconium Stained: None    Living Status: Living       Placenta Date/Time:     Placenta Removal:     Placenta Appearance:      Cord Information:      Cord Events:         Disposition of Cord Blood:      Blood Gases Sent?:          Cord pH:  none    Episiotomy: None   Laceration(s): None     Estimated Blood Loss (ml): No data found    Labor Events  Method: None      Augmentation: None   Cervical Ripening:     None        Operative Vaginal Delivery - none

## 2022-05-25 NOTE — ROUTINE PROCESS
.Bedside and Verbal shift change report given to GINGER Gonzales (oncoming nurse) by Chuck Uriostegui RN (offgoing nurse). Report included the following information SBAR, Kardex, OR Summary, Procedure Summary, Intake/Output, MAR and Recent Results.

## 2022-05-25 NOTE — PROGRESS NOTES
2149Called to nurse concerned of BRB on toilet tissue after voiding. Small amount BRB on toilet tissue, did not go to her peripad. Placed on monitor- reactive NST. Dr Candelaria Rabago made aware- no new orders at this time. Will continue to monitor. 0000- patient voided, again small amt BRB on toilet tissue after wiping. Scant amount on pad.

## 2022-05-25 NOTE — PROGRESS NOTES
SAB ADDENDUM NOTE:  Correction for Duramorph administration.  The patient received 0.2mg IT Duramorph NOT 2.0 mg

## 2022-05-25 NOTE — ANESTHESIA PREPROCEDURE EVALUATION
Relevant Problems   No relevant active problems       Anesthetic History     PONV and malignant hyperthermia          Review of Systems / Medical History  Patient summary reviewed, nursing notes reviewed and pertinent labs reviewed    Pulmonary  Within defined limits                 Neuro/Psych   Within defined limits           Cardiovascular    Hypertension              Exercise tolerance: >4 METS     GI/Hepatic/Renal  Within defined limits              Endo/Other  Within defined limits           Other Findings   Comments: Placenta Previa         Physical Exam    Airway  Mallampati: II  TM Distance: 4 - 6 cm  Neck ROM: normal range of motion   Mouth opening: Normal     Cardiovascular  Regular rate and rhythm,  S1 and S2 normal,  no murmur, click, rub, or gallop  Rhythm: regular  Rate: normal         Dental  No notable dental hx       Pulmonary  Breath sounds clear to auscultation               Abdominal  GI exam deferred       Other Findings            Anesthetic Plan    ASA: 2  Anesthesia type: spinal            Anesthetic plan and risks discussed with: Patient

## 2022-05-25 NOTE — PROGRESS NOTES
0715: SBAR report received from Stephen Ramírez. Care assumed at this time. 0745: pt up to shower, belly wiped with CHG. 2571: Dr. Gilberto Salamanca at bedside. Strip reviewed. POC discussed--move forward with c/s today, plan to go to OR at 10. Pt in agreement.  Prep begun

## 2022-05-26 LAB
ABO + RH BLD: NORMAL
ABO + RH BLD: NORMAL
BASOPHILS # BLD: 0.1 K/UL (ref 0–0.1)
BASOPHILS NFR BLD: 0 % (ref 0–1)
BLD PROD TYP BPU: NORMAL
BLOOD GROUP ANTIBODIES SERPL: NORMAL
BLOOD GROUP ANTIBODIES SERPL: NORMAL
BPU ID: NORMAL
CROSSMATCH RESULT,%XM: NORMAL
DIFFERENTIAL METHOD BLD: ABNORMAL
EOSINOPHIL # BLD: 0 K/UL (ref 0–0.4)
EOSINOPHIL NFR BLD: 0 % (ref 0–7)
ERYTHROCYTE [DISTWIDTH] IN BLOOD BY AUTOMATED COUNT: 13.6 % (ref 11.5–14.5)
HCT VFR BLD AUTO: 34 % (ref 35–47)
HGB BLD-MCNC: 11.4 G/DL (ref 11.5–16)
IMM GRANULOCYTES # BLD AUTO: 0.3 K/UL (ref 0–0.04)
IMM GRANULOCYTES NFR BLD AUTO: 2 % (ref 0–0.5)
LYMPHOCYTES # BLD: 1.9 K/UL (ref 0.8–3.5)
LYMPHOCYTES NFR BLD: 13 % (ref 12–49)
MCH RBC QN AUTO: 32.1 PG (ref 26–34)
MCHC RBC AUTO-ENTMCNC: 33.5 G/DL (ref 30–36.5)
MCV RBC AUTO: 95.8 FL (ref 80–99)
MONOCYTES # BLD: 1 K/UL (ref 0–1)
MONOCYTES NFR BLD: 7 % (ref 5–13)
NEUTS SEG # BLD: 11.6 K/UL (ref 1.8–8)
NEUTS SEG NFR BLD: 78 % (ref 32–75)
NRBC # BLD: 0 K/UL (ref 0–0.01)
NRBC BLD-RTO: 0 PER 100 WBC
PLATELET # BLD AUTO: 169 K/UL (ref 150–400)
PMV BLD AUTO: 10.8 FL (ref 8.9–12.9)
RBC # BLD AUTO: 3.55 M/UL (ref 3.8–5.2)
SPECIMEN EXP DATE BLD: NORMAL
SPECIMEN EXP DATE BLD: NORMAL
STATUS OF UNIT,%ST: NORMAL
UNIT DIVISION, %UDIV: 0
WBC # BLD AUTO: 14.9 K/UL (ref 3.6–11)

## 2022-05-26 PROCEDURE — 36415 COLL VENOUS BLD VENIPUNCTURE: CPT

## 2022-05-26 PROCEDURE — 65410000002 HC RM PRIVATE OB

## 2022-05-26 PROCEDURE — 86900 BLOOD TYPING SEROLOGIC ABO: CPT

## 2022-05-26 PROCEDURE — 85025 COMPLETE CBC W/AUTO DIFF WBC: CPT

## 2022-05-26 PROCEDURE — 74011250637 HC RX REV CODE- 250/637: Performed by: OBSTETRICS & GYNECOLOGY

## 2022-05-26 RX ADMIN — DOCUSATE SODIUM 100 MG: 100 CAPSULE, LIQUID FILLED ORAL at 22:32

## 2022-05-26 RX ADMIN — ACETAMINOPHEN 650 MG: 325 TABLET ORAL at 22:32

## 2022-05-26 RX ADMIN — DOCUSATE SODIUM 100 MG: 100 CAPSULE, LIQUID FILLED ORAL at 10:33

## 2022-05-26 RX ADMIN — IBUPROFEN 800 MG: 400 TABLET, FILM COATED ORAL at 06:15

## 2022-05-26 RX ADMIN — IBUPROFEN 800 MG: 400 TABLET, FILM COATED ORAL at 16:20

## 2022-05-26 RX ADMIN — IBUPROFEN 800 MG: 400 TABLET, FILM COATED ORAL at 22:31

## 2022-05-26 NOTE — PROGRESS NOTES
Bedside and Verbal shift change report given to GINGER Marroquin, 96 Faviola Sheth RN (oncoming nurse) by ALISON Vergara (offgoing nurse). Report included the following information SBAR and Kardex.

## 2022-05-26 NOTE — LACTATION NOTE
This note was copied from a baby's chart. 05/26/22 2644   Visit Information   Lactation Consult Visit Type IP Initial Consult   Visit Length 15 minutes   Reason for Visit Mother pumping for her NICU baby   Breast- Feeding Assessment   Breast-Feeding Experience Yes; Successfully  previous baby's for 2yrs each; Has a breast pump at home; Measured for 24mm flanges   Breast Assessment   Left Breast Medium  (Colostrum present)   Left Nipple Everted   Right Breast Medium  (Colostrum present)   Right Nipple Everted     Discussed the supply and demand concept of breast milk production and the importance of pumping every 2-3 hours. Mother was given the opportunity to ask questions. Plan:  Pump breasts every 2-3 hours for 15 minutes. Label with date and time pumped. Keep cold and transport on ice.

## 2022-05-26 NOTE — PROGRESS NOTES
Duramorph Follow-Up Note    1 Day Post-Op sp Procedure(s):   SECTION. /67   Pulse 79   Temp 36.7 °C (98 °F)   Resp 14   Ht 5' 2\" (1.575 m)   Wt 80.7 kg (178 lb)   LMP 06/15/2021   SpO2 100%   Breastfeeding Unknown   BMI 32.56 kg/m² . Patient is POD-1 S/P intrathecal duramorph. Pain is well controlled  Patient reports no headache, fever, weakness or numbness. Epidural/spinal tap site is clean, dry and intact. No obvious Anesthesia complications noted. Plan:    Pain management as per primary service.

## 2022-05-26 NOTE — PROGRESS NOTES
Bedside and Verbal shift change report given to ROSAMARIA Collins RN (oncoming nurse) by Natalya Kaufman. Governor ALISON Brady (offgoing nurse). Report included the following information SBAR and Kardex.

## 2022-05-26 NOTE — ROUTINE PROCESS
Bedside/verbal shift change report given to NIGEL Silverman RN (oncoming nurse) by ROSAMARIA Sun RN (offgoing nurse). Report included the following information SBAR, Procedure Summary, Intake/Output, MAR and Recent Results.

## 2022-05-26 NOTE — PROGRESS NOTES
Post-Operative  Day 1    Yin Gong     Assessment: Post-Op day 1, stable s/p PLTCS for heavy bleeding in setting of placenta previa at 33w6d. Hgb pending. Plan:     - Routine post-operative care. - Infant boy in NICU  - Postop hemoglobin stable. Plan to start Fe at discharge if pt anemic.  - Ambulate today. Information for the patient's :  Adeola Chatterjee [[id number]]   , Low Transverse     Patient doing well without significant complaint. Tolerating diet. Mejia out. Ambulating. Vitals:  Visit Vitals  /67   Pulse 79   Temp 98 °F (36.7 °C)   Resp 14   Ht 5' 2\" (1.575 m)   Wt 80.7 kg (178 lb)   LMP 06/15/2021   SpO2 100%   Breastfeeding Unknown   BMI 32.56 kg/m²     Temp (24hrs), Av.5 °F (36.9 °C), Min:98 °F (36.7 °C), Max:99.1 °F (37.3 °C)      Last 24hr Input/Output:    Intake/Output Summary (Last 24 hours) at 2022 0944  Last data filed at 2022 0434  Gross per 24 hour   Intake 2118.75 ml   Output 3000 ml   Net -881.25 ml          Exam:     Patient without distress. Fundus firm, nontender per nursing fundal checks. Incision bandaged, clean, dry, intact. Perineum with normal lochia noted per nursing assessment. Lower extremities are negative for pathological edema.     Labs:   Lab Results   Component Value Date/Time    WBC 9.9 2022 09:51 PM    WBC 11.5 (H) 2022 06:24 PM    WBC 10.2 2022 09:25 PM    WBC 12.0 (H) 2019 06:33 PM    WBC 7.0 2018 09:25 PM    WBC 11.9 (H) 2017 05:13 PM    WBC 5.5 2014 09:52 AM    HGB 11.9 2022 09:51 PM    HGB 12.0 2022 06:24 PM    HGB 12.2 2022 09:25 PM    HGB 12.6 2019 06:33 PM    HGB 13.8 2018 09:25 PM    HGB 13.7 2017 05:13 PM    HGB 13.4 2014 09:52 AM    HCT 34.6 (L) 2022 09:51 PM    HCT 35.7 2022 06:24 PM    HCT 35.6 2022 09:25 PM    HCT 35.9 2019 06:33 PM    HCT 41.4 07/09/2018 09:25 PM    HCT 39.4 03/01/2017 05:13 PM    HCT 41.3 07/28/2014 09:52 AM    PLATELET 063 29/35/9722 09:51 PM    PLATELET 770 68/16/5291 06:24 PM    PLATELET 632 88/39/5630 09:25 PM    PLATELET 549 89/35/7032 06:33 PM    PLATELET 744 30/70/6709 09:25 PM    PLATELET 258 13/56/4357 05:13 PM    PLATELET 517 09/12/5747 09:52 AM       No results found for this or any previous visit (from the past 24 hour(s)).

## 2022-05-27 VITALS
DIASTOLIC BLOOD PRESSURE: 86 MMHG | BODY MASS INDEX: 32.76 KG/M2 | SYSTOLIC BLOOD PRESSURE: 123 MMHG | RESPIRATION RATE: 16 BRPM | WEIGHT: 178 LBS | OXYGEN SATURATION: 99 % | HEART RATE: 93 BPM | HEIGHT: 62 IN | TEMPERATURE: 98.1 F

## 2022-05-27 PROCEDURE — 74011250637 HC RX REV CODE- 250/637: Performed by: OBSTETRICS & GYNECOLOGY

## 2022-05-27 RX ADMIN — IBUPROFEN 800 MG: 400 TABLET, FILM COATED ORAL at 09:29

## 2022-05-27 RX ADMIN — DOCUSATE SODIUM 100 MG: 100 CAPSULE, LIQUID FILLED ORAL at 09:28

## 2022-05-27 NOTE — DISCHARGE SUMMARY
Obstetrical Discharge Summary     Name: Corina Benitez MRN: [id number]  SSN: [id number]    YOB: 1995  Age: 32 y.o. Sex: female      Admit Date: 2022    Discharge Date: 2022     Admitting Physician: Jeffrey Arauz MD     Attending Physician: Carrie Preston, *     Admission Diagnoses: Term pregnancy, repeat [Z34.90]  Placenta previa antepartum in third trimester [O44.03]  Vaginal bleeding in pregnancy, third trimester [O46.93]  Vaginal bleeding [N93.9]    Discharge Diagnoses:   Information for the patient's :  Yobany Ng [[id number]]   Delivery of a 2.275 kg male infant via , Low Transverse on 2022 at 10:17 AM  by Jeffrey Arauz. Apgars were 8  and 9 . Additional Diagnoses:   Hospital Problems  Date Reviewed: 2022          Codes Class Noted POA    Vaginal bleeding ICD-10-CM: N93.9  ICD-9-CM: 623.8  2022 Unknown        Vaginal bleeding in pregnancy, third trimester ICD-10-CM: O46.93  ICD-9-CM: 641.93  2022 Unknown        Placenta previa antepartum in third trimester ICD-10-CM: O44.03  ICD-9-CM: 641.13  2022 Unknown             Lab Results   Component Value Date/Time    Rubella, External Immune 2021 12:00 AM    GrBStrep, External positive 10/17/2019 12:00 AM       Hospital Course: Patient was admitted at 28 4/7 wks with bleeding and complete placenta previa. This was her 2nd admission for bleeding. She received rescue dose BMZ. At 33 6/7 weeks she began having heavier bleeding and decision was made to proceed with PLTCS. Patient had an uncomplicated post-op course. Patient Instructions:   Current Discharge Medication List      CONTINUE these medications which have NOT CHANGED    Details   aspirin delayed-release 81 mg tablet Take  by mouth daily. magnesium 250 mg tab Take  by mouth. PNV no.24-iron-folic acid-dha (PRENATAL DHA+COMPLETE PRENATAL) -300 mg-mcg-mg cmpk Take  by mouth. Disposition at Discharge: Home or self care    Condition at Discharge: Stable    Reference my discharge instructions.     Follow-up Appointments   Procedures    FOLLOW UP VISIT Appointment in: 6 Weeks With Dr. Kevin Quinteros for postpartum check     With Dr. Kevin Quinteros for postpartum check     Standing Status:   Standing     Number of Occurrences:   1     Order Specific Question:   Appointment in     Answer:   6 Weeks        Signed By:  Alexy Wan MD     May 27, 2022

## 2022-05-27 NOTE — DISCHARGE INSTRUCTIONS
POST DELIVERY DISCHARGE INSTRUCTIONS    Name: Alexi Hancock  YOB: 1995  Primary Diagnosis: Active Problems:    Vaginal bleeding in pregnancy, third trimester (2022)      Placenta previa antepartum in third trimester (2022)      Vaginal bleeding (2022)        General:     Diet/Diet Restrictions:  · Eight 8-ounce glasses of fluid daily (water, juices); avoid excessive caffeine intake. · Meals/snacks as desired which are high in fiber and carbohydrates and low in fat and cholesterol. Medications:         Physical Activity / Restrictions / Safety:     · Avoid heavy lifting, no more that 8 lbs. For 2-3 weeks;   · Limit use of stairs to 2 times daily for the first week home. · No driving for one week. · Avoid intercourse 4-6 weeks, no douching or tampon use. · Check with obstetrician before starting or resuming an exercise program.      Discharge Instructions/Special Treatment/Home Care Needs:     · Continue prenatal vitamins. · Continue to use squirt bottle with warm water on your episiotomy after each bathroom use until bleeding stops. · If steri-strips applied to your incision, remove in 7-10 days. Call your doctor for the following:     · Fever over 101 degrees by mouth. · Vaginal bleeding heavier than a normal menstrual period or clots larger than a golf ball. · Red streaks or increased swelling of legs, painful red streaks on your breast.  · Painful urination, constipation and increased pain or swelling or discharge with your incision. · If you feel extremely anxious or overwhelmed. · If you have thoughts of harming yourself and/or your baby. Pain Management:     · Take Acetaminophen (Tylenol) or Ibuprofen (Advil, Motrin), as directed for pain. · Use a warm Sitz bath 3 times daily to relieve episiotomy or hemorrhoidal discomfort. · For hemorrhoidal discomfort, use Tucks and Anusol cream as needed and directed. · Heating pad to  incision as needed. Follow-Up Care:     Appointment with MD:   Follow-up Appointments   Procedures    FOLLOW UP VISIT Appointment in: 6 Weeks With Dr. Omayra Bush for postpartum check     With Dr. Omayra Bush for postpartum check     Standing Status:   Standing     Number of Occurrences:   1     Order Specific Question:   Appointment in     Answer:   Abisai Merida     Telephone number: [phone]    Signed By: Michelle Woodall MD                                                                                                   Date: 5/27/2022 Time: 9:01 AM

## 2022-05-27 NOTE — PROGRESS NOTES
Post-Operative  Day 2    [facility]       Assessment: Post-Op day 2, doing well after PLTCS at 35 6/7 due to bleeding with previa. Baby stable in NICU. Patient desires discharge today    Plan:   - Discharge home today. - Follow up in office in 6 week(s) with [facility].  - Pain medication prescription(s) sent. - Questions answered. Information for the patient's :  Candace Rivera [[id number]]   , Low Transverse    Patient doing well without significant complaint. Tolerating regular diet. Ambulating. Voiding without difficulty. Vitals:  Visit Vitals  /63 (BP 1 Location: Right upper arm, BP Patient Position: Sitting)   Pulse 65   Temp 98.1 °F (36.7 °C)   Resp 16   Ht 5' 2\" (1.575 m)   Wt 80.7 kg (178 lb)   LMP 06/15/2021   SpO2 98%   Breastfeeding Unknown   BMI 32.56 kg/m²     Temp (24hrs), Av.2 °F (36.8 °C), Min:98.1 °F (36.7 °C), Max:98.3 °F (36.8 °C)        Exam:       Patient without distress. Fundus firm, nontender per nursing fundal checks. Bandage removed. Clean, dry, intact. Perineum with normal lochia noted per nursing assessment. Lower extremities are negative for pathological edema.     Labs:   Lab Results   Component Value Date/Time    WBC 14.9 (H) 2022 10:07 AM    WBC 9.9 2022 09:51 PM    WBC 11.5 (H) 2022 06:24 PM    WBC 10.2 2022 09:25 PM    WBC 12.0 (H) 2019 06:33 PM    WBC 7.0 2018 09:25 PM    WBC 11.9 (H) 2017 05:13 PM    WBC 5.5 2014 09:52 AM    HGB 11.4 (L) 2022 10:07 AM    HGB 11.9 2022 09:51 PM    HGB 12.0 2022 06:24 PM    HGB 12.2 2022 09:25 PM    HGB 12.6 2019 06:33 PM    HGB 13.8 2018 09:25 PM    HGB 13.7 2017 05:13 PM    HGB 13.4 2014 09:52 AM    HCT 34.0 (L) 2022 10:07 AM    HCT 34.6 (L) 2022 09:51 PM    HCT 35.7 2022 06:24 PM    HCT 35.6 2022 09:25 PM    HCT 35.9 11/07/2019 06:33 PM    HCT 41.4 07/09/2018 09:25 PM    HCT 39.4 03/01/2017 05:13 PM    HCT 41.3 07/28/2014 09:52 AM    PLATELET 328 47/69/3285 10:07 AM    PLATELET 123 70/86/8402 09:51 PM    PLATELET 796 90/82/4924 06:24 PM    PLATELET 927 86/11/3656 09:25 PM    PLATELET 006 72/88/0228 06:33 PM    PLATELET 646 42/21/6890 09:25 PM    PLATELET 096 75/96/4717 05:13 PM    PLATELET 426 71/26/5994 09:52 AM       Recent Results (from the past 24 hour(s))   CBC WITH AUTOMATED DIFF    Collection Time: 05/26/22 10:07 AM   Result Value Ref Range    WBC 14.9 (H) 3.6 - 11.0 K/uL    RBC 3.55 (L) 3.80 - 5.20 M/uL    HGB 11.4 (L) 11.5 - 16.0 g/dL    HCT 34.0 (L) 35.0 - 47.0 %    MCV 95.8 80.0 - 99.0 FL    MCH 32.1 26.0 - 34.0 PG    MCHC 33.5 30.0 - 36.5 g/dL    RDW 13.6 11.5 - 14.5 %    PLATELET 793 577 - 275 K/uL    MPV 10.8 8.9 - 12.9 FL    NRBC 0.0 0  WBC    ABSOLUTE NRBC 0.00 0.00 - 0.01 K/uL    NEUTROPHILS 78 (H) 32 - 75 %    LYMPHOCYTES 13 12 - 49 %    MONOCYTES 7 5 - 13 %    EOSINOPHILS 0 0 - 7 %    BASOPHILS 0 0 - 1 %    IMMATURE GRANULOCYTES 2 (H) 0.0 - 0.5 %    ABS. NEUTROPHILS 11.6 (H) 1.8 - 8.0 K/UL    ABS. LYMPHOCYTES 1.9 0.8 - 3.5 K/UL    ABS. MONOCYTES 1.0 0.0 - 1.0 K/UL    ABS. EOSINOPHILS 0.0 0.0 - 0.4 K/UL    ABS. BASOPHILS 0.1 0.0 - 0.1 K/UL    ABS. IMM.  GRANS. 0.3 (H) 0.00 - 0.04 K/UL    DF AUTOMATED     TYPE & SCREEN    Collection Time: 05/26/22  9:23 PM   Result Value Ref Range    Crossmatch Expiration 05/29/2022,1669     ABO/Rh(D) Abrahan Wyatt POSITIVE     Antibody screen NEG

## (undated) DEVICE — SOLIDIFIER FLD 2OZ 1500CC N DISINF IN BTL DISP SAFESORB

## (undated) DEVICE — MEDI-VAC NON-CONDUCTIVE SUCTION TUBING: Brand: CARDINAL HEALTH

## (undated) DEVICE — FILTER SET UTER VAC CURET SYS -- GYRUS

## (undated) DEVICE — TUBING SUCT L6FT ID0.5IN CLR PLAS M CONN

## (undated) DEVICE — C-SECTION II-LF: Brand: MEDLINE INDUSTRIES, INC.

## (undated) DEVICE — 3000CC GUARDIAN II: Brand: GUARDIAN

## (undated) DEVICE — REM POLYHESIVE ADULT PATIENT RETURN ELECTRODE: Brand: VALLEYLAB

## (undated) DEVICE — SOLUTION IV 1000ML 0.9% SOD CHL

## (undated) DEVICE — CURETTE UTER VAC CRV RIG 7MM -- GYRUS

## (undated) DEVICE — PREP SKN CHLRAPRP APL 26ML STR --

## (undated) DEVICE — STAPLER SKIN SQ 30 ABSRB STPL DISP INSORB

## (undated) DEVICE — STERILE POLYISOPRENE POWDER-FREE SURGICAL GLOVES: Brand: PROTEXIS

## (undated) DEVICE — TRAP TISS DISP FOR COLL SYS BERK SAFETOUCH

## (undated) DEVICE — SOL IRRIGATION INJ NACL 0.9% 500ML BTL

## (undated) DEVICE — STERILE POLYISOPRENE POWDER-FREE SURGICAL GLOVES WITH EMOLLIENT COATING: Brand: PROTEXIS

## (undated) DEVICE — COVER LT HNDL PLAS RIG 1 PER PK

## (undated) DEVICE — BRUSH CLEANING PKG/5 CLEANING

## (undated) DEVICE — GLOVE SURG SZ 65 THK91MIL LTX FREE SYN POLYISOPRENE

## (undated) DEVICE — SUTURE VCRL SZ 0 L36IN ABSRB VLT L40MM CT 1/2 CIR J358H

## (undated) DEVICE — PENCIL ES L3M BTTN SWCH S STL HEX LOK BLDE ELECTRD HOLSTER

## (undated) DEVICE — SUTURE MCRYL SZ 0 L36IN ABSRB VLT L48MM CTX 1/2 CIR Y398H

## (undated) DEVICE — TIP CLEANER: Brand: VALLEYLAB

## (undated) DEVICE — CONTINU-FLO SOLUTION SET, 2 INJECTION SITES, MALE LUER LOCK ADAPTER WITH RETRACTABLE COLLAR, LARGE BORE STOPCOCK WITH ROTATING MALE LUER LOCK EXTENSION SET, 2 INJECTION SITES, MALE LUER LOCK ADAPTER WITH RETRACTABLE COLLAR: Brand: INTERLINK/CONTINU-FLO

## (undated) DEVICE — 3M™ TEGADERM™ TRANSPARENT FILM DRESSING FRAME STYLE, 1624W, 2-3/8 IN X 2-3/4 IN (6 CM X 7 CM), 100/CT 4CT/CASE: Brand: 3M™ TEGADERM™

## (undated) DEVICE — GOWN,SIRUS,FABRNF,XL,20/CS: Brand: MEDLINE

## (undated) DEVICE — D&C MRMC: Brand: MEDLINE INDUSTRIES, INC.

## (undated) DEVICE — LARGE, DISPOSABLE ALEXIS O C-SECTION PROTECTOR - RETRACTOR: Brand: ALEXIS ® O C-SECTION PROTECTOR - RETRACTOR

## (undated) DEVICE — SUTURE VCRL SZ 2-0 L36IN ABSRB VLT L36MM CT-1 1/2 CIR J345H

## (undated) DEVICE — STRAP,POSITIONING,KNEE/BODY,FOAM,4X60": Brand: MEDLINE